# Patient Record
Sex: FEMALE | Race: BLACK OR AFRICAN AMERICAN | Employment: OTHER | URBAN - METROPOLITAN AREA
[De-identification: names, ages, dates, MRNs, and addresses within clinical notes are randomized per-mention and may not be internally consistent; named-entity substitution may affect disease eponyms.]

---

## 2017-03-09 ENCOUNTER — OFFICE VISIT (OUTPATIENT)
Dept: INTERNAL MEDICINE CLINIC | Age: 67
End: 2017-03-09

## 2017-03-09 VITALS
TEMPERATURE: 97.9 F | HEIGHT: 65 IN | DIASTOLIC BLOOD PRESSURE: 88 MMHG | OXYGEN SATURATION: 100 % | SYSTOLIC BLOOD PRESSURE: 155 MMHG | WEIGHT: 144.8 LBS | HEART RATE: 70 BPM | BODY MASS INDEX: 24.12 KG/M2 | RESPIRATION RATE: 18 BRPM

## 2017-03-09 DIAGNOSIS — N39.0 LOWER URINARY TRACT INFECTIOUS DISEASE: Primary | ICD-10-CM

## 2017-03-09 DIAGNOSIS — R09.81 SINUS CONGESTION: ICD-10-CM

## 2017-03-09 DIAGNOSIS — I10 ESSENTIAL HYPERTENSION: ICD-10-CM

## 2017-03-09 RX ORDER — POLYMYXIN B SULFATE AND TRIMETHOPRIM 1; 10000 MG/ML; [USP'U]/ML
1 SOLUTION OPHTHALMIC EVERY 4 HOURS
Qty: 10 BOTTLE | Refills: 1 | Status: SHIPPED | OUTPATIENT
Start: 2017-03-09 | End: 2017-03-16

## 2017-03-09 RX ORDER — CEFUROXIME AXETIL 500 MG/1
500 TABLET ORAL 2 TIMES DAILY
Qty: 20 TAB | Refills: 0 | Status: SHIPPED | OUTPATIENT
Start: 2017-03-09 | End: 2017-03-16

## 2017-03-09 NOTE — MR AVS SNAPSHOT
Visit Information Date & Time Provider Department Dept. Phone Encounter #  
 3/9/2017  2:45 PM Mary Morocho MD [facility] - Rajni Seo [phone] [id number] Follow-up Instructions Return in about 6 months (around 9/9/2017), or if symptoms worsen or fail to improve. Follow-up and Disposition History Your Appointments 4/20/2017  4:45 PM  
Any with MD PRABHJOT [facility] - Rajni Seo ([facility]) Appt Note: 4 month f/u  
 [address], [place] [address] [place] [address]  
  
   
 [address] [facility] [address] [facility] [facility] Upcoming Health Maintenance Date Due  
 GLAUCOMA SCREENING Q2Y 9/29/2016 OSTEOPOROSIS SCREENING (DEXA) 9/29/2016 Pneumococcal 65+ Low/Medium Risk (1 of 2 - PCV13) 9/29/2016 FOBT Q 1 YEAR AGE 50-75 4/26/2017 MEDICARE YEARLY EXAM 12/16/2017 BREAST CANCER SCRN MAMMOGRAM 5/20/2018 DTaP/Tdap/Td series (2 - Td) 5/26/2026 Allergies as of 3/9/2017  Review Complete On: 3/9/2017 By: Mary Morocho MD  
  
 Severity Noted Reaction Type Reactions Tamiflu [Oseltamivir Phosphate]  02/03/2015    Hives Current Immunizations  Never Reviewed No immunizations on file. Not reviewed this visit You Were Diagnosed With   
  
 Codes Comments Lower urinary tract infectious disease    -  Primary ICD-10-CM: N39.0 ICD-9-CM: 599.0 Sinus congestion     ICD-10-CM: R09.81 ICD-9-CM: 478.19 Essential hypertension     ICD-10-CM: I10 
ICD-9-CM: 401.9 Vitals BP Pulse Temp Resp Height(growth percentile) Weight(growth percentile) 155/88 (BP 1 Location: Right arm, BP Patient Position: Sitting) 70 97.9 °F (36.6 °C) 18 5' 5\" (1.651 m) 144 lb 12.8 oz (65.7 kg) SpO2 BMI OB Status Smoking Status 100% 24.1 kg/m2 Hysterectomy Never Smoker BMI and BSA Data  Body Mass Index Body Surface Area  
 24.1 kg/m 2 1.74 m 2  
  
  
 Preferred Pharmacy Pharmacy Name Phone [facility] #[id number] [facility] [phone] Your Updated Medication List  
  
   
This list is accurate as of: 3/9/17  4:54 PM.  Always use your most recent med list.  
  
  
  
  
 cefUROXime 500 mg tablet Commonly known as:  CEFTIN Take 1 Tab by mouth two (2) times a day for 10 days. diphenhydrAMINE 25 mg capsule Commonly known as:  BENADRYL  
  
 fluconazole 150 mg tablet Commonly known as:  DIFLUCAN  
TAKE 1 TABLET BY MOUTH ONCE DAILY AS DIRECTED  
  
 fluocinoNIDE 0.05 % topical cream  
Commonly known as:  LIDEX HYDROmorphone 2 mg tablet Commonly known as:  DILAUDID Take 1 Tab by mouth every four (4) hours as needed for Pain. Lactobacillus acidophilus 460 mg (20 billion cell) Cap Commonly known as:  Herminia Bottoms Take 1 Tab by mouth daily. mometasone 50 mcg/actuation nasal spray Commonly known as:  NASONEX  
2 Sprays by Both Nostrils route daily. olmesartan-hydroCHLOROthiazide 20-12.5 mg per tablet Commonly known as:  BENICAR HCT Take 1 Tab by mouth daily. permethrin 5 % topical cream  
Commonly known as:  ACTICIN  
  
 pneumococcal 13 peggy conj dip 0.5 mL Syrg injection Commonly known as:  PREVNAR-13  
0.5 mL by IntraMUSCular route once for 1 dose. PSEUDOEPHEDRINE-guaiFENesin Tb12 extended release tablet Commonly known as:  [address] Take 1 Tab by mouth two (2) times a day. sodium chloride 0.65 % nasal spray Commonly known as:  OCEAN  
2 Sprays by Both Nostrils route four (4) times daily. SUMAtriptan 50 mg tablet Commonly known as:  IMITREX Take 1 Tab by mouth once as needed for Migraine for up to 1 dose. TOPAMAX 200 mg tablet Generic drug:  topiramate Take  by mouth nightly. traZODone 100 mg tablet Commonly known as:  Marva Fran Take 1 Tab by mouth nightly. triamcinolone acetonide 0.1 % topical cream  
Commonly known as:  KENALOG Apply  to affected area two (2) times a day. trimethoprim-polymyxin b ophthalmic solution Commonly known as:  POLYTRIM Administer 1 Drop to both eyes every four (4) hours for 10 days. Prescriptions Sent to Pharmacy Refills  
 pneumococcal 13 peggy conj dip (PREVNAR-13) 0.5 mL syrg injection 0 Si.5 mL by IntraMUSCular route once for 1 dose. Class: Normal  
 [id number] [address] Ph #: [phone] Route: IntraMUSCular  
 trimethoprim-polymyxin b (POLYTRIM) ophthalmic solution 1 Sig: Administer 1 Drop to both eyes every four (4) hours for 10 days. Class: Normal  
 [id number] [address] Ph #: [phone] Route: Both Eyes  
 cefUROXime (CEFTIN) 500 mg tablet 0 Sig: Take 1 Tab by mouth two (2) times a day for 10 days. Class: Normal  
 [id number] Ph #: [phone] Route: Oral  
  
We Performed the Following CULTURE, URINE [id number] CPT(R)] RENAL FUNCTION PANEL [24143 CPT(R)] URINALYSIS W/ RFLX MICROSCOPIC [08417 CPT(R)] Follow-up Instructions Return in about 6 months (around 2017), or if symptoms worsen or fail to improve. To-Do List   
 2017 3:30 PM  
(Arrive by 3:15 PM) Appointment with [facility] 1 at [facility] ([phone]) Shower or bathe using soap and water. Do not use deodorant, powder, perfumes, or lotion the day of your exam.  If your prior mammograms were not performed at [facility] 6 please bring films with you or forward prior images 2 days before your procedure.   Check in at registration 15min before your appointment time unless you were instructed to do otherwise. A script is not necessary, but if you have one, please bring it on the day of the mammogram or have it faxed to the department. [facility] [phone] [facility]  [phone] [facility] Roberto [address] [place] [phone] [facility] [phone] [place] [id number] [facility] [phone] Please arrive 15 minutes prior to appointment to register Introducing [facility]! Jason Sosa introduces [employer] patient portal. Now you can access parts of your medical record, email your doctor's office, and request medication refills online. 1. In your internet browser, go to [email or web address] 2. Click on the First Time User? Click Here link in the Sign In box. You will see the New Member Sign Up page. 3. Enter your [employer] Access Code exactly as it appears below. You will not need to use this code after youve completed the sign-up process. If you do not sign up before the expiration date, you must request a new code. · [employer] Access Code: [email or web address] Expires: 3/15/2017  3:17 PM 
 
4. Enter the last four digits of your Social Security Number (xxxx) and Date of Birth (mm/dd/yyyy) as indicated and click Submit. You will be taken to the next sign-up page. 5. Create a [employer] ID. This will be your [employer] login ID and cannot be changed, so think of one that is secure and easy to remember. 6. Create a [employer] password. You can change your password at any time. 7. Enter your Password Reset Question and Answer. This can be used at a later time if you forget your password. 8. Enter your e-mail address. You will receive e-mail notification when new information is available in [address] 9. Click Sign Up. You can now view and download portions of your medical record. 10. Click the Download Summary menu link to download a portable copy of your medical information. If you have questions, please visit the Frequently Asked Questions section of the [employer] website.  Remember, [employer] is NOT to be used for urgent needs. For medical emergencies, dial 911. Now available from your iPhone and Android! Please provide this summary of care documentation to your next provider. Your primary care clinician is listed as Payton Snyder. If you have any questions after today's visit, please call [phone].

## 2017-03-09 NOTE — PROGRESS NOTES
SPORTS MEDICINE [facility] PRIMARY CARE  Alex Perkins MD, [address] [address]  Phone:  [phone]  Fax: [phone]      Chief Complaint   Patient presents with    Urinary Pain     patient complain of having painful urination, burning when urinating, frequent urination    Sinus Infection     patient states that she has pain in both eyes, sinus headaches , and  bleeding from the nose patient states that she has just finished 10 days of mucinex D     Eye Drainage     patient states that she has some drainage in both eyes for 1 week         SUBECTIVE:    Shelia Thorpe is a 73 y.o. female Patient returns today ambulatory, alert and appropriate and has the capacity to give an accurate history. She has a known history of dyslipidemia, headaches, hypertension, prediabetes, spinal stenosis, and is seen for evaluation. Patient states she has three infections going on. She has a sinus infection that started about two or three weeks ago. She started using Mucinex D which accounts for the elevated blood pressure. She has facial pain but little drainage. She noted bilateral eye infections for about a week. She wakes up in the morning and they are actually stuck together. For the past ten days she has had pain, burning, and urinary frequency and is seen for evaluation. Current Outpatient Prescriptions   Medication Sig Dispense Refill    pneumococcal 13 peggy conj dip (PREVNAR-13) 0.5 mL syrg injection 0.5 mL by IntraMUSCular route once for 1 dose. 0.5 mL 0    trimethoprim-polymyxin b (POLYTRIM) ophthalmic solution Administer 1 Drop to both eyes every four (4) hours for 10 days. 10 Bottle 1    cefUROXime (CEFTIN) 500 mg tablet Take 1 Tab by mouth two (2) times a day for 10 days. 20 Tab 0    traZODone (DESYREL) 100 mg tablet Take 1 Tab by mouth nightly. 180 Tab 2    SUMAtriptan (IMITREX) 50 mg tablet Take 1 Tab by mouth once as needed for Migraine for up to 1 dose. 9 Tab 10    fluconazole (DIFLUCAN) 150 mg tablet TAKE 1 TABLET BY MOUTH ONCE DAILY AS DIRECTED 2 Tab 1    mometasone (NASONEX) 50 mcg/actuation nasal spray 2 Sprays by Both Nostrils route daily. 1 Container 1    triamcinolone acetonide (KENALOG) 0.1 % topical cream Apply  to affected area two (2) times a day. 60 g 3    HYDROmorphone (DILAUDID) 2 mg tablet Take 1 Tab by mouth every four (4) hours as needed for Pain. 10 Tab 0    topiramate (TOPAMAX) 200 mg tablet Take  by mouth nightly.  sodium chloride (OCEAN) 0.65 % nasal spray 2 Sprays by Both Nostrils route four (4) times daily. 45 mL 11    Lactobacillus acidophilus (FLORAJEN) 460 mg (20 billion cell) cap Take 1 Tab by mouth daily. 14 Cap 0    PSEUDOEPHEDRINE-guaiFENesin (MUCINEX D MAXIMUM STRENGTH) Tb12 extended release tablet Take 1 Tab by mouth two (2) times a day. 60 Tab 1    diphenhydrAMINE (BENADRYL) 25 mg capsule   0    fluocinoNIDE (LIDEX) 0.05 % topical cream   2    permethrin (ACTICIN) 5 % topical cream   0    olmesartan-hydrochlorothiazide (BENICAR HCT) 20-12.5 mg per tablet Take 1 Tab by mouth daily. 80 Tab 11     Past Medical History:   Diagnosis Date    Bereavement 3/16    father - dementia - age 80    Dense breasts     Depression     Dyslipidemia     Headache     High cholesterol     HTN (hypertension)     Hypertension     Migraines     Prediabetes     Rash     S/P colonoscopy 4-3-14    Sinus congestion     Spinal stenosis     UTI (lower urinary tract infection)      Past Surgical History:   Procedure Laterality Date    HX COLONOSCOPY      HX GYN      HX HYSTERECTOMY       Allergies   Allergen Reactions    Tamiflu [Oseltamivir Phosphate] Hives       REVIEW OF SYSTEMS:   No chills. No fever.         Social History     Social History    Marital status:      Spouse name: N/A    Number of children: N/A    Years of education: N/A     Social History Main Topics    Smoking status: Never Smoker    Smokeless tobacco: None    Alcohol use No    Drug use: No    Sexual activity: Yes     Partners: Male     Birth control/ protection: None     Other Topics Concern    None     Social History Narrative         Family History: Mother:  76 yrs, Hypertension and diabetes mi,cvaFather: alive 80 yrs, htnDaughter(s): alive 32 yrsSon(s): alive 28    yrs1 son(s) , 1 daughter(s) . Social History: Alcohol Use Patient uses alcohol, Drinks per occasion: 2, Drinks per w [place]: 0. Smoking Status Patient is a never    smoker. Marital Status: . Lives alone.  left suddenly 2015Occupation/W ork: not employed. Education/School: has highschool diploma, has college    diploma [address]  Family History   Problem Relation Age of Onset    Stroke Mother        OBJECTIVE:  Visit Vitals    /88 (BP 1 Location: Right arm, BP Patient Position: Sitting)    Pulse 70    Temp 97.9 °F (36.6 °C)    Resp 18    Ht 5' 5\" (1.651 m)    Wt 144 lb 12.8 oz (65.7 kg)    SpO2 100%    BMI 24.1 kg/m2     ENT: perrla,  eom intact  NECK: supple. Thyroid normal  CHEST: clear to ascultation and percussion   HEART: regular rate and rhythm  ABD: soft, bowel sounds active  EXTREMITIES: no edema, pulse 1+     No visits with results within 3 Month(s) from this visit.   Latest known visit with results is:    Office Visit on 09/15/2016   Component Date Value Ref Range Status    Specific Gravity 09/15/2016 1.018  1.005 - 1.030 Final    pH (UA) 09/15/2016 7.0  5.0 - 7.5 Final    Color 09/15/2016 Yellow  Yellow Final    Appearance 09/15/2016 Clear  Clear Final    Leukocyte Esterase 09/15/2016 Negative  Negative Final    Protein 09/15/2016 Negative  Negative/Trace Final    Glucose 09/15/2016 Negative  Negative Final    Ketone 09/15/2016 Negative  Negative Final    Blood 09/15/2016 Negative  Negative Final    Bilirubin 09/15/2016 Negative  Negative Final    Urobilinogen 09/15/2016 0.2  0.2 - 1.0 mg/dL Final  Nitrites 09/15/2016 Negative  Negative Final    Microscopic Examination 09/15/2016 Comment   Final    Microscopic not indicated and not performed.  WBC 09/15/2016 4.5  3.4 - 10.8 x10E3/uL Final    RBC 09/15/2016 4.28  3.77 - 5.28 x10E6/uL Final    HGB 09/15/2016 12.6  11.1 - 15.9 g/dL Final    HCT 09/15/2016 37.5  34.0 - 46.6 % Final    MCV 09/15/2016 88  79 - 97 fL Final    MCH 09/15/2016 29.4  26.6 - 33.0 pg Final    MCHC 09/15/2016 33.6  31.5 - 35.7 g/dL Final    RDW 09/15/2016 14.1  12.3 - 15.4 % Final    PLATELET 58/80/9163 040  150 - 379 x10E3/uL Final    NEUTROPHILS 09/15/2016 42  % Final    Lymphocytes 09/15/2016 43  % Final    MONOCYTES 09/15/2016 7  % Final    EOSINOPHILS 09/15/2016 7  % Final    BASOPHILS 09/15/2016 1  % Final    ABS. NEUTROPHILS 09/15/2016 1.9  1.4 - 7.0 x10E3/uL Final    Abs Lymphocytes 09/15/2016 1.9  0.7 - 3.1 x10E3/uL Final    ABS. MONOCYTES 09/15/2016 0.3  0.1 - 0.9 x10E3/uL Final    ABS. EOSINOPHILS 09/15/2016 0.3  0.0 - 0.4 x10E3/uL Final    ABS. BASOPHILS 09/15/2016 0.0  0.0 - 0.2 x10E3/uL Final    IMMATURE GRANULOCYTES 09/15/2016 0  % Final    ABS. IMM.  GRANS. 09/15/2016 0.0  0.0 - 0.1 x10E3/uL Final    Glucose 09/15/2016 96  65 - 99 mg/dL Final    BUN 09/15/2016 14  8 - 27 mg/dL Final    Creatinine 09/15/2016 1.02* 0.57 - 1.00 mg/dL Final    GFR est non-AA 09/15/2016 58* >59 mL/min/1.73 Final    GFR est AA 09/15/2016 67  >59 mL/min/1.73 Final    BUN/Creatinine ratio 09/15/2016 14  11 - 26 Final    Sodium 09/15/2016 143  134 - 144 mmol/L Final    Potassium 09/15/2016 4.2  3.5 - 5.2 mmol/L Final    Chloride 09/15/2016 106  97 - 108 mmol/L Final    CO2 09/15/2016 22  18 - 29 mmol/L Final    Calcium 09/15/2016 9.8  8.7 - 10.3 mg/dL Final    Protein, total 09/15/2016 7.2  6.0 - 8.5 g/dL Final    Albumin 09/15/2016 4.3  3.6 - 4.8 g/dL Final    GLOBULIN, TOTAL 09/15/2016 2.9  1.5 - 4.5 g/dL Final    A-G Ratio 09/15/2016 1.5  1.1 - 2.5 Final  Bilirubin, total 09/15/2016 0.2  0.0 - 1.2 mg/dL Final    Alk. phosphatase 09/15/2016 54  39 - 117 IU/L Final    AST (SGOT) 09/15/2016 20  0 - 40 IU/L Final    ALT (SGPT) 09/15/2016 15  0 - 32 IU/L Final    Cholesterol, total 09/15/2016 237* 100 - 199 mg/dL Final    Triglyceride 09/15/2016 92  0 - 149 mg/dL Final    HDL Cholesterol 09/15/2016 63  >39 mg/dL Final    Comment: According to ATP-III Guidelines, HDL-C >59 mg/dL is considered a  negative risk factor for CHD.  VLDL, calculated 09/15/2016 18  5 - 40 mg/dL Final    LDL, calculated 09/15/2016 156* 0 - 99 mg/dL Final    TSH 09/15/2016 0.678  0.450 - 4.500 uIU/mL Final    Hemoglobin A1c 09/15/2016 5.8* 4.8 - 5.6 % Final    Comment:          Pre-diabetes: 5.7 - 6.4           Diabetes: >6.4           Glycemic control for adults with diabetes: <7.0      Estimated average glucose 09/15/2016 120  mg/dL Final          ASSESSMENT:  1. Lower urinary tract infectious disease    2. Sinus congestion    3. Essential hypertension      Patient has symptoms consistent with conjunctivitis and we will treat him with Polytrim. Patient has symptoms consistent with a sinus infection and treated with Ceftin. She also has a symptomatic urinary tract infection for which we will also use Ceftin. We encourage activity for 30 minutes five days a week. Her blood pressure is up and may be related to the Sudafed she is taking for her sinuses. She will check her blood pressure and if it remains elevated she will contact us. She will return to the office otherwise in six months.          PLAN:  .  Orders Placed This Encounter    CULTURE, URINE    RENAL FUNCTION PANEL    URINALYSIS W/ RFLX MICROSCOPIC    pneumococcal 13 peggy conj dip (PREVNAR-13) 0.5 mL syrg injection    trimethoprim-polymyxin b (POLYTRIM) ophthalmic solution    cefUROXime (CEFTIN) 500 mg tablet       Follow-up Disposition:  Return in about 6 months (around 9/9/2017), or if symptoms worsen or fail to improve. ATTENTION:   This medical record was transcribed using an electronic medical records system. Although proofread, it may and can contain electronic and spelling errors. Other human spelling and other errors may be present. Corrections may be executed at a later time. Please feel free to contact us for any clarifications as needed.

## 2017-03-09 NOTE — PROGRESS NOTES
1. Have you been to the ER, urgent care clinic since your last visit? Hospitalized since your last visit? No    2. Have you seen or consulted any other health care providers outside of the [facility] since your last visit? Include any pap smears or colon screening.  No

## 2017-03-16 ENCOUNTER — HOSPITAL ENCOUNTER (EMERGENCY)
Age: 67
Discharge: HOME | End: 2017-03-16
Attending: EMERGENCY MEDICINE
Payer: COMMERCIAL

## 2017-03-16 VITALS
OXYGEN SATURATION: 99 % | HEART RATE: 75 BPM | RESPIRATION RATE: 23 BRPM | HEIGHT: 65 IN | BODY MASS INDEX: 24.16 KG/M2 | DIASTOLIC BLOOD PRESSURE: 66 MMHG | TEMPERATURE: 98.3 F | SYSTOLIC BLOOD PRESSURE: 138 MMHG | WEIGHT: 145 LBS

## 2017-03-16 DIAGNOSIS — R11.2 NAUSEA AND VOMITING, INTRACTABILITY OF VOMITING NOT SPECIFIED, UNSPECIFIED VOMITING TYPE: ICD-10-CM

## 2017-03-16 DIAGNOSIS — R19.7 DIARRHEA, UNSPECIFIED TYPE: ICD-10-CM

## 2017-03-16 DIAGNOSIS — M54.50 ACUTE LOW BACK PAIN WITHOUT SCIATICA, UNSPECIFIED BACK PAIN LATERALITY: ICD-10-CM

## 2017-03-16 DIAGNOSIS — R10.84 ABDOMINAL PAIN, GENERALIZED: Primary | ICD-10-CM

## 2017-03-16 LAB
ALBUMIN SERPL BCP-MCNC: 4.2 G/DL (ref 3.5–5)
ALBUMIN/GLOB SERPL: 1.1 {RATIO} (ref 1.1–2.2)
ALP SERPL-CCNC: 57 U/L (ref 45–117)
ALT SERPL-CCNC: 20 U/L (ref 12–78)
AMORPH CRY URNS QL MICRO: ABNORMAL
ANION GAP BLD CALC-SCNC: 10 MMOL/L (ref 5–15)
APPEARANCE UR: ABNORMAL
AST SERPL W P-5'-P-CCNC: 21 U/L (ref 15–37)
BACTERIA URNS QL MICRO: NEGATIVE /HPF
BASOPHILS # BLD AUTO: 0 K/UL (ref 0–0.1)
BASOPHILS # BLD: 0 % (ref 0–1)
BILIRUB SERPL-MCNC: 0.5 MG/DL (ref 0.2–1)
BILIRUB UR QL: NEGATIVE
BUN SERPL-MCNC: 24 MG/DL (ref 6–20)
BUN/CREAT SERPL: 21 (ref 12–20)
CALCIUM SERPL-MCNC: 8.9 MG/DL (ref 8.5–10.1)
CHLORIDE SERPL-SCNC: 109 MMOL/L (ref 97–108)
CO2 SERPL-SCNC: 25 MMOL/L (ref 21–32)
COLOR UR: ABNORMAL
CREAT SERPL-MCNC: 1.14 MG/DL (ref 0.55–1.02)
DIFFERENTIAL METHOD BLD: ABNORMAL
EOSINOPHIL # BLD: 0 K/UL (ref 0–0.4)
EOSINOPHIL NFR BLD: 0 % (ref 0–7)
EPITH CASTS URNS QL MICRO: ABNORMAL /LPF
ERYTHROCYTE [DISTWIDTH] IN BLOOD BY AUTOMATED COUNT: 13.4 % (ref 11.5–14.5)
GLOBULIN SER CALC-MCNC: 3.8 G/DL (ref 2–4)
GLUCOSE SERPL-MCNC: 127 MG/DL (ref 65–100)
GLUCOSE UR STRIP.AUTO-MCNC: NEGATIVE MG/DL
HCT VFR BLD AUTO: 43 % (ref 35–47)
HGB BLD-MCNC: 13.7 G/DL (ref 11.5–16)
HGB UR QL STRIP: NEGATIVE
KETONES UR QL STRIP.AUTO: NEGATIVE MG/DL
LEUKOCYTE ESTERASE UR QL STRIP.AUTO: NEGATIVE
LIPASE SERPL-CCNC: 120 U/L (ref 73–393)
LYMPHOCYTES # BLD AUTO: 4 % (ref 12–49)
LYMPHOCYTES # BLD: 0.3 K/UL (ref 0.8–3.5)
MCH RBC QN AUTO: 28.5 PG (ref 26–34)
MCHC RBC AUTO-ENTMCNC: 31.9 G/DL (ref 30–36.5)
MCV RBC AUTO: 89.4 FL (ref 80–99)
MONOCYTES # BLD: 0.3 K/UL (ref 0–1)
MONOCYTES NFR BLD AUTO: 4 % (ref 5–13)
MUCOUS THREADS URNS QL MICRO: ABNORMAL /LPF
NEUTS SEG # BLD: 7 K/UL (ref 1.8–8)
NEUTS SEG NFR BLD AUTO: 92 % (ref 32–75)
NITRITE UR QL STRIP.AUTO: NEGATIVE
PH UR STRIP: 5.5 [PH] (ref 5–8)
PLATELET # BLD AUTO: 330 K/UL (ref 150–400)
POTASSIUM SERPL-SCNC: 3.6 MMOL/L (ref 3.5–5.1)
PROT SERPL-MCNC: 8 G/DL (ref 6.4–8.2)
PROT UR STRIP-MCNC: NEGATIVE MG/DL
RBC # BLD AUTO: 4.81 M/UL (ref 3.8–5.2)
RBC #/AREA URNS HPF: ABNORMAL /HPF (ref 0–5)
RBC MORPH BLD: ABNORMAL
SODIUM SERPL-SCNC: 144 MMOL/L (ref 136–145)
SP GR UR REFRACTOMETRY: >1.03 (ref 1–1.03)
UA: UC IF INDICATED,UAUC: ABNORMAL
UROBILINOGEN UR QL STRIP.AUTO: 0.2 EU/DL (ref 0.2–1)
WBC # BLD AUTO: 7.6 K/UL (ref 3.6–11)
WBC URNS QL MICRO: ABNORMAL /HPF (ref 0–4)

## 2017-03-16 PROCEDURE — 93005 ELECTROCARDIOGRAM TRACING: CPT

## 2017-03-16 PROCEDURE — 74011250636 HC RX REV CODE- 250/636: Performed by: EMERGENCY MEDICINE

## 2017-03-16 PROCEDURE — 96374 THER/PROPH/DIAG INJ IV PUSH: CPT

## 2017-03-16 PROCEDURE — 96361 HYDRATE IV INFUSION ADD-ON: CPT

## 2017-03-16 PROCEDURE — 36415 COLL VENOUS BLD VENIPUNCTURE: CPT | Performed by: EMERGENCY MEDICINE

## 2017-03-16 PROCEDURE — 99285 EMERGENCY DEPT VISIT HI MDM: CPT

## 2017-03-16 PROCEDURE — 80053 COMPREHEN METABOLIC PANEL: CPT | Performed by: EMERGENCY MEDICINE

## 2017-03-16 PROCEDURE — 81001 URINALYSIS AUTO W/SCOPE: CPT | Performed by: EMERGENCY MEDICINE

## 2017-03-16 PROCEDURE — 83690 ASSAY OF LIPASE: CPT | Performed by: EMERGENCY MEDICINE

## 2017-03-16 PROCEDURE — 85025 COMPLETE CBC W/AUTO DIFF WBC: CPT | Performed by: EMERGENCY MEDICINE

## 2017-03-16 PROCEDURE — 96375 TX/PRO/DX INJ NEW DRUG ADDON: CPT

## 2017-03-16 RX ORDER — ONDANSETRON 4 MG/1
4 TABLET, ORALLY DISINTEGRATING ORAL
Qty: 20 TAB | Refills: 0 | Status: SHIPPED | OUTPATIENT
Start: 2017-03-16 | End: 2017-09-08 | Stop reason: ALTCHOICE

## 2017-03-16 RX ORDER — MORPHINE SULFATE 4 MG/ML
4 INJECTION, SOLUTION INTRAMUSCULAR; INTRAVENOUS ONCE
Status: COMPLETED | OUTPATIENT
Start: 2017-03-16 | End: 2017-03-16

## 2017-03-16 RX ORDER — ONDANSETRON 2 MG/ML
8 INJECTION INTRAMUSCULAR; INTRAVENOUS
Status: COMPLETED | OUTPATIENT
Start: 2017-03-16 | End: 2017-03-16

## 2017-03-16 RX ORDER — HYDROCODONE BITARTRATE AND ACETAMINOPHEN 5; 325 MG/1; MG/1
1 TABLET ORAL
Qty: 20 TAB | Refills: 0 | Status: SHIPPED | OUTPATIENT
Start: 2017-03-16 | End: 2017-09-08 | Stop reason: ALTCHOICE

## 2017-03-16 RX ORDER — LOPERAMIDE HCL 2 MG
2 TABLET ORAL
Qty: 20 TAB | Refills: 0 | Status: SHIPPED | OUTPATIENT
Start: 2017-03-16 | End: 2017-03-21

## 2017-03-16 RX ADMIN — SODIUM CHLORIDE 1000 ML: 900 INJECTION, SOLUTION INTRAVENOUS at 10:44

## 2017-03-16 RX ADMIN — ONDANSETRON 8 MG: 2 INJECTION INTRAMUSCULAR; INTRAVENOUS at 10:48

## 2017-03-16 RX ADMIN — Medication 4 MG: at 10:48

## 2017-03-16 NOTE — ED NOTES
The patient was discharged home by Dr. Jason Serrano and Sneha Magallanes RN in stable condition, accompanied by daughter . The patient is alert and oriented, is in no respiratory distress. The patient's diagnosis, condition and treatment were explained to patient or parent/guardian. The patient/responsible party expressed understanding. 1 prescriptions given to pt. No work/school note given to pt. A discharge plan has been developed. A  was not involved in the process. Aftercare instructions were given to the patient.

## 2017-03-16 NOTE — ED PROVIDER NOTES
HPI Comments: The patient complains of abdominal cramps that started at around 9 PM last night, and has been associated with 2 episodes of vomiting and approximately 6 episodes of nonbloody diarrhea. She also complains of low back pain that started 2 days ago after she bent over to  a box. The pain is mechanical and nonradiating. The patient has been on an antibiotic, Ceftin, for the past 7 days for a UTI and sinus infection. She denies fever or other complaints. Patient is a 73 y.o. female presenting with epigastric pain, vomiting, and back pain. Epigastric Pain    Associated symptoms include diarrhea, vomiting and back pain. Pertinent negatives include no fever, no nausea, no dysuria, no headaches and no chest pain. Vomiting    Associated symptoms include abdominal pain and diarrhea. Pertinent negatives include no fever, no headaches, no cough and no headaches. Back Pain    Associated symptoms include abdominal pain. Pertinent negatives include no chest pain, no fever, no headaches and no dysuria. Past Medical History:   Diagnosis Date    Bereavement 3/16    father - dementia - age 80    Dense breasts     Depression     Dyslipidemia     Headache     High cholesterol     HTN (hypertension)     Hypertension     Migraines     Prediabetes     Rash     S/P colonoscopy 4-3-14    Sinus congestion     Spinal stenosis     UTI (lower urinary tract infection)        Past Surgical History:   Procedure Laterality Date    HX COLONOSCOPY      HX GYN      HX HYSTERECTOMY           Family History:   Problem Relation Age of Onset    Stroke Mother        Social History     Social History    Marital status:      Spouse name: N/A    Number of children: N/A    Years of education: N/A     Occupational History    Not on file.      Social History Main Topics    Smoking status: Never Smoker    Smokeless tobacco: Not on file    Alcohol use No    Drug use: No    Sexual activity: Yes Partners: Male     Birth control/ protection: None     Other Topics Concern    Not on file     Social History Narrative         Family History: Mother:  76 yrs, Hypertension and diabetes mi,cvaFather: alive 80 yrs, htnDaughter(s): alive 32 yrsSon(s): alive 28    yrs1 son(s) , 1 daughter(s) . Social History: Alcohol Use Patient uses alcohol, Drinks per occasion: 2, Drinks per w [place]: 0. Smoking Status Patient is a never    smoker. Marital Status: . Lives alone.  left suddenly 2015Occupation/W ork: not employed. Education/School: has highschool diploma, has college    diploma [address]: Tamiflu [oseltamivir phosphate]    Review of Systems   Constitutional: Negative for fever. Eyes: Negative for visual disturbance. Respiratory: Negative for cough, shortness of breath and wheezing. Cardiovascular: Negative for chest pain and leg swelling. Gastrointestinal: Positive for abdominal pain, diarrhea and vomiting. Negative for nausea. Genitourinary: Negative for dysuria. Musculoskeletal: Positive for back pain. Negative for neck stiffness. Skin: Negative for rash. Neurological: Negative. Negative for syncope and headaches. Psychiatric/Behavioral: Negative for confusion. Vitals:    17 1020   Pulse: 79   Resp: 16   Temp: 98.3 °F (36.8 °C)   SpO2: 100%   Weight: 65.8 kg (145 lb)   Height: 5' 5\" (1.651 m)            Physical Exam   Constitutional: She appears well-developed and well-nourished. No distress. HENT:   Head: Normocephalic. Eyes: Pupils are equal, round, and reactive to light. Neck: Normal range of motion. Cardiovascular: Normal rate and regular rhythm. No murmur heard. Pulmonary/Chest: Effort normal and breath sounds normal. No respiratory distress. Abdominal: Soft. Minimal ttp  In periumbilical area. Musculoskeletal: Normal range of motion. She exhibits no edema. Neurological: She is alert.  She has normal strength. Skin: Skin is warm and dry. Psychiatric: She has a normal mood and affect. Her behavior is normal.   Nursing note and vitals reviewed. [facility]  ED Course       Procedures ED EKG interpretation:  Rhythm:nsr, rate 79, nsst changes. This EKG was interpreted by Vibha Mckee MD,ED Provider.

## 2017-03-16 NOTE — ED NOTES
Pt resting calmly on stretcher in NAD with daughter at bedside. Pt reports \"the pain is much better. \"  IVF infusing to gravity without difficulty. Pt on monitor x3.

## 2017-03-16 NOTE — ED TRIAGE NOTES
Pt brought to treatment area with c/o \"epigastric pain, vomiting, and diarrhea that started last night. \"  Pt states \"i also think I hurt my lower back when I was bending over to pick something up on Tuesday. \"  Pt denies fevers, SOB, urinary symptoms.

## 2017-03-16 NOTE — DISCHARGE INSTRUCTIONS

## 2017-03-17 LAB
ATRIAL RATE: 79 BPM
CALCULATED P AXIS, ECG09: 62 DEGREES
CALCULATED R AXIS, ECG10: -6 DEGREES
CALCULATED T AXIS, ECG11: -48 DEGREES
DIAGNOSIS, 93000: NORMAL
P-R INTERVAL, ECG05: 160 MS
Q-T INTERVAL, ECG07: 374 MS
QRS DURATION, ECG06: 92 MS
QTC CALCULATION (BEZET), ECG08: 428 MS
VENTRICULAR RATE, ECG03: 79 BPM

## 2017-04-13 RX ORDER — FLUCONAZOLE 150 MG/1
150 TABLET ORAL DAILY
Qty: 1 TAB | Refills: 0 | Status: SHIPPED | OUTPATIENT
Start: 2017-04-13 | End: 2017-04-14

## 2017-05-22 ENCOUNTER — HOSPITAL ENCOUNTER (OUTPATIENT)
Dept: MAMMOGRAPHY | Age: 67
Discharge: HOME | End: 2017-05-22
Attending: INTERNAL MEDICINE
Payer: COMMERCIAL

## 2017-05-22 DIAGNOSIS — Z12.31 VISIT FOR SCREENING MAMMOGRAM: ICD-10-CM

## 2017-05-22 PROCEDURE — 77067 SCR MAMMO BI INCL CAD: CPT

## 2017-09-07 ENCOUNTER — OFFICE VISIT (OUTPATIENT)
Dept: INTERNAL MEDICINE CLINIC | Age: 67
End: 2017-09-07

## 2017-09-07 VITALS
RESPIRATION RATE: 18 BRPM | WEIGHT: 148 LBS | DIASTOLIC BLOOD PRESSURE: 79 MMHG | HEART RATE: 69 BPM | BODY MASS INDEX: 24.66 KG/M2 | TEMPERATURE: 98.6 F | SYSTOLIC BLOOD PRESSURE: 146 MMHG | OXYGEN SATURATION: 100 % | HEIGHT: 65 IN

## 2017-09-07 DIAGNOSIS — E78.00 HIGH CHOLESTEROL: ICD-10-CM

## 2017-09-07 DIAGNOSIS — M48.07 SPINAL STENOSIS OF LUMBOSACRAL REGION: ICD-10-CM

## 2017-09-07 DIAGNOSIS — R73.03 PREDIABETES: ICD-10-CM

## 2017-09-07 DIAGNOSIS — I10 ESSENTIAL HYPERTENSION: Primary | ICD-10-CM

## 2017-09-07 NOTE — PROGRESS NOTES
SPORTS MEDICINE AND PRIMARY CARE  Nolan Shay MD, [address] [address]  Phone:  [phone]  Fax: [phone]      Chief Complaint   Patient presents with    Physical         SUBECTIVE:    Yemi Mclaughlin is a 73 y.o. femalePatient returns today alert, appropriate, ambulatory and has the capacity to give an accurate history. She has a known history of , primary hypertension, prediabetes,  and is seen for evaluation. Current Outpatient Prescriptions   Medication Sig Dispense Refill    pneumococcal 23-valent (PNEUMOVAX 23) 25 mcg/0.5 mL injection 0.5 mL by IntraMUSCular route once for 1 dose. 0.5 mL 0    traZODone (DESYREL) 100 mg tablet Take 1 Tab by mouth nightly. 180 Tab 2    SUMAtriptan (IMITREX) 50 mg tablet Take 1 Tab by mouth once as needed for Migraine for up to 1 dose. 9 Tab 10    topiramate (TOPAMAX) 200 mg tablet Take  by mouth nightly.  HYDROcodone-acetaminophen (NORCO) 5-325 mg per tablet Take 1 Tab by mouth every four (4) hours as needed for Pain for up to 20 doses. Max Daily Amount: 6 Tabs. 20 Tab 0    ondansetron (ZOFRAN ODT) 4 mg disintegrating tablet Take 1 Tab by mouth every eight (8) hours as needed for Nausea. 20 Tab 0    Lactobacillus acidophilus (FLORAJEN) 460 mg (20 billion cell) cap Take 1 Tab by mouth daily.  14 Cap 0     Past Medical History:   Diagnosis Date    Bereavement 3/16    father - dementia - age 80    Dense breasts     Depression     Dyslipidemia     Headache     High cholesterol     HTN (hypertension)     Hypertension     Migraines     Prediabetes     Rash     S/P colonoscopy 4-3-14    Sinus congestion     Spinal stenosis     UTI (lower urinary tract infection)      Past Surgical History:   Procedure Laterality Date    HX BREAST BIOPSY Right 2000    neg; surgical bx    HX COLONOSCOPY      HX GYN      HX HYSTERECTOMY       Allergies   Allergen Reactions    Tamiflu [Oseltamivir Phosphate] Hives       REVIEW OF SYSTEMS:   No chest pain. Social History     Social History    Marital status:      Spouse name: N/A    Number of children: N/A    Years of education: N/A     Social History Main Topics    Smoking status: Never Smoker    Smokeless tobacco: Never Used    Alcohol use No    Drug use: No    Sexual activity: Yes     Partners: Male     Birth control/ protection: None     Other Topics Concern    None     Social History Narrative         Family History: Mother:  76 yrs, Hypertension and diabetes mi,cvaFather: alive 80 yrs, htnDaughter(s): alive 32 yrsSon(s): alive 28    yrs1 son(s) , 1 daughter(s) . Social History: Alcohol Use Patient uses alcohol, Drinks per occasion: 2, Drinks per w [employer]: 0. Smoking Status Patient is a never    smoker. Marital Status: . Lives alone.  left suddenly  returned ! Occupation/W ork: not employed. Education/School: has highschool diploma, has college    diploma [id number]  Family History   Problem Relation Age of Onset    Stroke Mother        OBJECTIVE:  Visit Vitals    /79 (BP 1 Location: Right arm, BP Patient Position: Sitting)    Pulse 69    Temp 98.6 °F (37 °C) (Oral)    Resp 18    Ht 5' 5\" (1.651 m)    Wt 148 lb (67.1 kg)    SpO2 100%    BMI 24.63 kg/m2     ENT: perrla,  eom intact  NECK: supple. Thyroid normal  CHEST: clear to ascultation and percussion   HEART: regular rate and rhythm  ABD: soft, bowel sounds active  EXTREMITIES: no edema, pulse 1+     No visits with results within 3 Month(s) from this visit.   Latest known visit with results is:    Admission on 2017, Discharged on 2017   Component Date Value Ref Range Status    Ventricular Rate 2017 79  BPM Final    Atrial Rate 2017 79  BPM Final    P-R Interval 2017 160  ms Final    QRS Duration 2017 92  ms Final    Q-T Interval 2017 374  ms Final    QTC Calculation (Bezet) 2017 428 ms Final    Calculated P Axis 03/16/2017 62  degrees Final    Calculated R Axis 03/16/2017 -6  degrees Final    Calculated T Axis 03/16/2017 -48  degrees Final    Diagnosis 03/16/2017    Final                    Value:Normal sinus rhythm  Nonspecific ST and T wave abnormality  Abnormal ECG  When compared with ECG of 15-JUL-2012 14:02,  Nonspecific T wave abnormality now evident in Anterolateral leads  Confirmed by Emilia Lugo MD., Surinder ([id number]) on 3/17/2017 12:14:34 AM      WBC 03/16/2017 7.6  3.6 - 11.0 K/uL Final    RBC 03/16/2017 4.81  3.80 - 5.20 M/uL Final    HGB 03/16/2017 13.7  11.5 - 16.0 g/dL Final    HCT 03/16/2017 43.0  35.0 - 47.0 % Final    MCV 03/16/2017 89.4  80.0 - 99.0 FL Final    MCH 03/16/2017 28.5  26.0 - 34.0 PG Final    MCHC 03/16/2017 31.9  30.0 - 36.5 g/dL Final    RDW 03/16/2017 13.4  11.5 - 14.5 % Final    PLATELET 07/21/0380 202  150 - 400 K/uL Final    NEUTROPHILS 03/16/2017 92* 32 - 75 % Final    LYMPHOCYTES 03/16/2017 4* 12 - 49 % Final    MONOCYTES 03/16/2017 4* 5 - 13 % Final    EOSINOPHILS 03/16/2017 0  0 - 7 % Final    BASOPHILS 03/16/2017 0  0 - 1 % Final    ABS. NEUTROPHILS 03/16/2017 7.0  1.8 - 8.0 K/UL Final    ABS. LYMPHOCYTES 03/16/2017 0.3* 0.8 - 3.5 K/UL Final    ABS. MONOCYTES 03/16/2017 0.3  0.0 - 1.0 K/UL Final    ABS. EOSINOPHILS 03/16/2017 0.0  0.0 - 0.4 K/UL Final    ABS.  BASOPHILS 03/16/2017 0.0  0.0 - 0.1 K/UL Final    DF 03/16/2017 SMEAR SCANNED    Final    RBC COMMENTS 03/16/2017 NORMOCYTIC, NORMOCHROMIC    Final    Sodium 03/16/2017 144  136 - 145 mmol/L Final    Potassium 03/16/2017 3.6  3.5 - 5.1 mmol/L Final    Chloride 03/16/2017 109* 97 - 108 mmol/L Final    CO2 03/16/2017 25  21 - 32 mmol/L Final    Anion gap 03/16/2017 10  5 - 15 mmol/L Final    Glucose 03/16/2017 127* 65 - 100 mg/dL Final    BUN 03/16/2017 24* 6 - 20 MG/DL Final    Creatinine 03/16/2017 1.14* 0.55 - 1.02 MG/DL Final    BUN/Creatinine ratio 03/16/2017 21* 12 - 20 Final    GFR est AA 03/16/2017 58* >60 ml/min/1.73m2 Final    GFR est non-AA 03/16/2017 48* >60 ml/min/1.73m2 Final    Comment: Estimated GFR is calculated using the IDMS-traceable Modification of Diet in Renal Disease (MDRD) Study equation, reported for both  Americans (GFRAA) and non- Americans (GFRNA), and normalized to 1.73m2 body surface area. The physician must decide which value applies to the patient. The MDRD study equation should only be used in individuals age 25 or older. It has not been validated for the following: pregnant women, patients with serious comorbid conditions, or on certain medications, or persons with extremes of body size, muscle mass, or nutritional status.  Calcium 03/16/2017 8.9  8.5 - 10.1 MG/DL Final    Bilirubin, total 03/16/2017 0.5  0.2 - 1.0 MG/DL Final    ALT (SGPT) 03/16/2017 20  12 - 78 U/L Final    AST (SGOT) 03/16/2017 21  15 - 37 U/L Final    Alk.  phosphatase 03/16/2017 57  45 - 117 U/L Final    Protein, total 03/16/2017 8.0  6.4 - 8.2 g/dL Final    Albumin 03/16/2017 4.2  3.5 - 5.0 g/dL Final    Globulin 03/16/2017 3.8  2.0 - 4.0 g/dL Final    A-G Ratio 03/16/2017 1.1  1.1 - 2.2   Final    Lipase 03/16/2017 120  73 - 393 U/L Final    Color 03/16/2017 YELLOW/STRAW    Final    Color Reference Range: Straw, Yellow or Dark Yellow    Appearance 03/16/2017 HAZY* CLEAR   Final    Specific gravity 03/16/2017 >1.030* 1.003 - 1.030 Final    pH (UA) 03/16/2017 5.5  5.0 - 8.0   Final    Protein 03/16/2017 NEGATIVE   NEG mg/dL Final    Glucose 03/16/2017 NEGATIVE   NEG mg/dL Final    Ketone 03/16/2017 NEGATIVE   NEG mg/dL Final    Bilirubin 03/16/2017 NEGATIVE   NEG   Final    Blood 03/16/2017 NEGATIVE   NEG   Final    Urobilinogen 03/16/2017 0.2  0.2 - 1.0 EU/dL Final    Nitrites 03/16/2017 NEGATIVE   NEG   Final    Leukocyte Esterase 03/16/2017 NEGATIVE   NEG   Final    WBC 03/16/2017 0-4  0 - 4 /hpf Final    RBC 03/16/2017 0-5  0 - 5 /hpf Final    Epithelial cells 03/16/2017 FEW  FEW /lpf Final    Epithelial cell category consists of squamous cells and /or transitional urothelial cells. Renal tubular cells, if present, are separately identified as such.  Bacteria 03/16/2017 NEGATIVE   NEG /hpf Final    UA:UC IF INDICATED 03/16/2017 CULTURE NOT INDICATED BY UA RESULT  CNI   Final    Mucus 03/16/2017 TRACE* NEG /lpf Final    Amorphous Crystals 03/16/2017 3+* NEG Final          ASSESSMENT:  1. Essential hypertension    2. High cholesterol    3. Prediabetes    4. Spinal stenosis of lumbosacral region      Patient's medical progress is satisfactory. Blood pressure not well controlled. She will check it at home and call if greated than 130/80   dyslipidemia manged with diet -will consider a statin   prediabetes - continue diet and exercise   spinal stenosis - continue analgesics and exercises        PLAN:  .  Orders Placed This Encounter    METABOLIC PANEL, BASIC    LIPID PANEL    HEMOGLOBIN A1C WITH EAG    REFERRAL TO OPHTHALMOLOGY    pneumococcal 23-valent (PNEUMOVAX 23) 25 mcg/0.5 mL injection       Follow-up Disposition:  Return in about 6 months (around 3/7/2018). ATTENTION:   This medical record was transcribed using an electronic medical records system. Although proofread, it may and can contain electronic and spelling errors. Other human spelling and other errors may be present. Corrections may be executed at a later time. Please feel free to contact us for any clarifications as needed.

## 2017-09-07 NOTE — PROGRESS NOTES
.1. Have you been to the ER, urgent care clinic since your last visit? Hospitalized since your last visit? No    2. Have you seen or consulted any other health care providers outside of the [address] [place] [address] since your last visit? Include any pap smears or colon screening.  No

## 2017-09-07 NOTE — MR AVS SNAPSHOT
Visit Information Date & Time Provider Department Dept. Phone Encounter #  
 9/7/2017  4:30 PM Bonita Anthony MD SPORTS MED AND PRIMARY CARE - [facility] [phone] [id number] Follow-up Instructions Return in about 6 months (around 3/7/2018). Follow-up and Disposition History Upcoming Health Maintenance Date Due FOBT Q 1 YEAR AGE 50-75 4/26/2017 MEDICARE YEARLY EXAM 12/16/2017 Pneumococcal 65+ Low/Medium Risk (2 of 2 - PPSV23) 9/7/2018 BREAST CANCER SCRN MAMMOGRAM 5/22/2019 GLAUCOMA SCREENING Q2Y 9/7/2019 DTaP/Tdap/Td series (2 - Td) 5/26/2026 Allergies as of 9/7/2017  Review Complete On: 9/7/2017 By: Amauri Duque Severity Noted Reaction Type Reactions Tamiflu [Oseltamivir Phosphate]  02/03/2015    Hives Current Immunizations  Never Reviewed No immunizations on file. Not reviewed this visit You Were Diagnosed With   
  
 Codes Comments Essential hypertension    -  Primary ICD-10-CM: I10 
ICD-9-CM: 401.9 High cholesterol     ICD-10-CM: E78.00 ICD-9-CM: 272.0 Prediabetes     ICD-10-CM: R73.03 
ICD-9-CM: 790.29 Spinal stenosis of lumbosacral region     ICD-10-CM: M48.07 
ICD-9-CM: 724.02 Vitals BP Pulse Temp Resp Height(growth percentile) Weight(growth percentile) 146/79 (BP 1 Location: Right arm, BP Patient Position: Sitting) 69 98.6 °F (37 °C) (Oral) 18 5' 5\" (1.651 m) 148 lb (67.1 kg) SpO2 BMI OB Status Smoking Status 100% 24.63 kg/m2 Hysterectomy Never Smoker BMI and BSA Data Body Mass Index Body Surface Area  
 24.63 kg/m 2 1.75 m 2 Preferred Pharmacy Pharmacy Name Phone [id number] [facility] beds at the [facility] [phone] Your Updated Medication List  
  
   
This list is accurate as of: 9/7/17  5:33 PM.  Always use your most recent med list.  
  
  
  
  
 HYDROcodone-acetaminophen 5-325 mg per tablet Commonly known as:  Helene Denise Take 1 Tab by mouth every four (4) hours as needed for Pain for up to 20 doses. Max Daily Amount: 6 Tabs. Lactobacillus acidophilus 460 mg (20 billion cell) Cap Commonly known as:  Papito Neth Take 1 Tab by mouth daily. ondansetron 4 mg disintegrating tablet Commonly known as:  ZOFRAN ODT Take 1 Tab by mouth every eight (8) hours as needed for Nausea. pneumococcal 23-valent 25 mcg/0.5 mL injection Commonly known as:  PNEUMOVAX 23  
0.5 mL by IntraMUSCular route once for 1 dose. SUMAtriptan 50 mg tablet Commonly known as:  IMITREX Take 1 Tab by mouth once as needed for Migraine for up to 1 dose. TOPAMAX 200 mg tablet Generic drug:  topiramate Take  by mouth nightly. traZODone 100 mg tablet Commonly known as:  Bebo Somerset Take 1 Tab by mouth nightly. Prescriptions Sent to Pharmacy Refills  
 pneumococcal 23-valent (PNEUMOVAX 23) 25 mcg/0.5 mL injection 0 Si.5 mL by IntraMUSCular route once for 1 dose. Class: Normal  
 Pharmacy: [facility]/pharmacy #[id number] #: [phone] Route: IntraMUSCular We Performed the Following HEMOGLOBIN A1C WITH EAG [44518 CPT(R)] LIPID PANEL [87678 CPT(R)] METABOLIC PANEL, BASIC [90660 CPT(R)] [place] COLLECTION VENOUS BLOOD,VENIPUNCTURE [id number] CPT(R)] REFERRAL TO OPHTHALMOLOGY [REF57 Custom] Follow-up Instructions Return in about 6 months (around 3/7/2018). Referral Information Referral ID Referred By Referred To  
  
 [id number] Manuel JENKINS Not Available Visits Status Start Date End Date 1 New Request 17 If your referral has a status of pending review or denied, additional information will be sent to support the outcome of this decision. Introducing [facility] & HEALTH SERVICES!    
 Karo Henry introduces [employer] patient portal. Now you can access parts of your medical record, email your doctor's office, and request medication refills online. 1. In your internet browser, go to [email or web address] 2. Click on the First Time User? Click Here link in the Sign In box. You will see the New Member Sign Up page. 3. Enter your [employer] Access Code exactly as it appears below. You will not need to use this code after youve completed the sign-up process. If you do not sign up before the expiration date, you must request a new code. · [employer] Access Code: [email or web address] Expires: 12/6/2017  5:33 PM 
 
4. Enter the last four digits of your Social Security Number (xxxx) and Date of Birth (mm/dd/yyyy) as indicated and click Submit. You will be taken to the next sign-up page. 5. Create a Loopt ID. This will be your Loopt login ID and cannot be changed, so think of one that is secure and easy to remember. 6. Create a Loopt password. You can change your password at any time. 7. Enter your Password Reset Question and Answer. This can be used at a later time if you forget your password. 8. Enter your e-mail address. You will receive e-mail notification when new information is available in [address] 9. Click Sign Up. You can now view and download portions of your medical record. 10. Click the Download Summary menu link to download a portable copy of your medical information. If you have questions, please visit the Frequently Asked Questions section of the [employer] website. Remember, [employer] is NOT to be used for urgent needs. For medical emergencies, dial 911. Now available from your iPhone and Android! Please provide this summary of care documentation to your next provider. Your primary care clinician is listed as Seth Miner. If you have any questions after today's visit, please call [phone].

## 2017-09-08 LAB
BUN SERPL-MCNC: 12 MG/DL (ref 8–27)
BUN/CREAT SERPL: 10 (ref 12–28)
CALCIUM SERPL-MCNC: 10.1 MG/DL (ref 8.7–10.3)
CHLORIDE SERPL-SCNC: 106 MMOL/L (ref 96–106)
CHOLEST SERPL-MCNC: 251 MG/DL (ref 100–199)
CO2 SERPL-SCNC: 19 MMOL/L (ref 18–29)
CREAT SERPL-MCNC: 1.17 MG/DL (ref 0.57–1)
EST. AVERAGE GLUCOSE BLD GHB EST-MCNC: 114 MG/DL
GLUCOSE SERPL-MCNC: 90 MG/DL (ref 65–99)
HBA1C MFR BLD: 5.6 % (ref 4.8–5.6)
HDLC SERPL-MCNC: 63 MG/DL
LDLC SERPL CALC-MCNC: 172 MG/DL (ref 0–99)
POTASSIUM SERPL-SCNC: 4 MMOL/L (ref 3.5–5.2)
SODIUM SERPL-SCNC: 141 MMOL/L (ref 134–144)
TRIGL SERPL-MCNC: 79 MG/DL (ref 0–149)
VLDLC SERPL CALC-MCNC: 16 MG/DL (ref 5–40)

## 2017-09-08 RX ORDER — ATORVASTATIN CALCIUM 40 MG/1
40 TABLET, FILM COATED ORAL DAILY
Qty: 30 TAB | Refills: 11 | Status: SHIPPED | OUTPATIENT
Start: 2017-09-08 | End: 2017-10-13 | Stop reason: SDUPTHER

## 2017-10-14 RX ORDER — ATORVASTATIN CALCIUM 40 MG/1
40 TABLET, FILM COATED ORAL DAILY
Qty: 90 TAB | Refills: 3 | Status: SHIPPED | OUTPATIENT
Start: 2017-10-14 | End: 2018-07-18 | Stop reason: SDUPTHER

## 2018-03-05 RX ORDER — TRAZODONE HYDROCHLORIDE 100 MG/1
TABLET ORAL
Qty: 180 TAB | Refills: 1 | Status: SHIPPED | OUTPATIENT
Start: 2018-03-05 | End: 2018-03-29 | Stop reason: SDUPTHER

## 2018-03-06 RX ORDER — MOMETASONE FUROATE 50 UG/1
SPRAY, METERED NASAL
Qty: 1 CONTAINER | Refills: 11 | Status: SHIPPED | OUTPATIENT
Start: 2018-03-06 | End: 2022-01-27

## 2018-03-06 RX ORDER — CEFUROXIME AXETIL 250 MG/1
250 TABLET ORAL 2 TIMES DAILY
Qty: 14 TAB | Refills: 0 | Status: SHIPPED | OUTPATIENT
Start: 2018-03-06 | End: 2018-03-13

## 2018-03-29 ENCOUNTER — OFFICE VISIT (OUTPATIENT)
Dept: INTERNAL MEDICINE CLINIC | Age: 68
End: 2018-03-29

## 2018-03-29 VITALS
WEIGHT: 153.8 LBS | RESPIRATION RATE: 16 BRPM | DIASTOLIC BLOOD PRESSURE: 83 MMHG | HEART RATE: 77 BPM | SYSTOLIC BLOOD PRESSURE: 149 MMHG | BODY MASS INDEX: 25.62 KG/M2 | HEIGHT: 65 IN | TEMPERATURE: 99.1 F

## 2018-03-29 DIAGNOSIS — M48.07 SPINAL STENOSIS OF LUMBOSACRAL REGION: ICD-10-CM

## 2018-03-29 DIAGNOSIS — E78.00 HIGH CHOLESTEROL: ICD-10-CM

## 2018-03-29 DIAGNOSIS — G43.909 MIGRAINE WITHOUT STATUS MIGRAINOSUS, NOT INTRACTABLE, UNSPECIFIED MIGRAINE TYPE: ICD-10-CM

## 2018-03-29 DIAGNOSIS — I10 ESSENTIAL HYPERTENSION: Primary | ICD-10-CM

## 2018-03-29 RX ORDER — SODIUM CHLORIDE 0.65 %
AEROSOL, SPRAY (ML) NASAL
Qty: 45 ML | Refills: 11 | Status: SHIPPED | OUTPATIENT
Start: 2018-03-29 | End: 2019-04-23 | Stop reason: SDUPTHER

## 2018-03-29 RX ORDER — SUMATRIPTAN 50 MG/1
50 TABLET, FILM COATED ORAL
Qty: 9 TAB | Refills: 10 | Status: SHIPPED | OUTPATIENT
Start: 2018-03-29 | End: 2018-07-17 | Stop reason: SDUPTHER

## 2018-03-29 RX ORDER — TRAZODONE HYDROCHLORIDE 100 MG/1
100 TABLET ORAL
Qty: 180 TAB | Refills: 1 | Status: SHIPPED | OUTPATIENT
Start: 2018-03-29 | End: 2019-06-12 | Stop reason: SDUPTHER

## 2018-03-29 NOTE — MR AVS SNAPSHOT
[address] [address] [place] 
[phone] Patient: Sybil Barboza MRN: [id number] SVO:[id number] Visit Information Date & Time Provider Department Dept. Phone Encounter #  
 3/29/2018  4:45 PM Sarika Cardona MD SPORTS MED AND PRIMARY CARE - Rick Melgar [phone] [id number] Your Appointments 9/27/2018  4:30 PM  
Any with Sarika Cardona MD  
[address] ([address]) Appt Note: 6 month follow up [address], [place] [address] [facility] [address]  
  
   
 [address], [place] [address] [place] [address] Upcoming Health Maintenance Date Due Pneumococcal 65+ Low/Medium Risk (2 of 2 - PPSV23) 9/7/2018 BREAST CANCER SCRN MAMMOGRAM 5/22/2019 GLAUCOMA SCREENING Q2Y 9/7/2019 COLONOSCOPY 4/3/2024 DTaP/Tdap/Td series (2 - Td) 5/26/2026 Allergies as of 3/29/2018  Review Complete On: 3/29/2018 By: Yin Cronin Severity Noted Reaction Type Reactions Tamiflu [Oseltamivir Phosphate]  02/03/2015    Hives Current Immunizations  Never Reviewed No immunizations on file. Not reviewed this visit You Were Diagnosed With   
  
 Codes Comments Essential hypertension    -  Primary ICD-10-CM: I10 
ICD-9-CM: 401.9 High cholesterol     ICD-10-CM: E78.00 ICD-9-CM: 272.0 Migraine without status migrainosus, not intractable, unspecified migraine type     ICD-10-CM: G43.909 ICD-9-CM: 346.90 Spinal stenosis of lumbosacral region     ICD-10-CM: M48.07 
ICD-9-CM: 724.02 Vitals BP Pulse Temp Resp Height(growth percentile) Weight(growth percentile) 149/83 77 99.1 °F (37.3 °C) (Oral) 16 5' 5\" (1.651 m) 153 lb 12.8 oz (69.8 kg) BMI OB Status Smoking Status 25.59 kg/m2 Hysterectomy Never Smoker BMI and BSA Data Body Mass Index Body Surface Area 25.59 kg/m 2 1.79 m 2 Preferred Pharmacy Pharmacy Name Phone [id number] [phone] [facility] [phone] Your Updated Medication List  
  
   
This list is accurate as of 3/29/18  5:35 PM.  Always use your most recent med list.  
  
  
  
  
 atorvastatin 40 mg tablet Commonly known as:  LIPITOR Take 1 Tab by mouth daily. mometasone 50 mcg/actuation nasal spray Commonly known as:  NASONEX  
USE 2 SPRAYS IN EACH NOSTRIL EVERY DAY  
  
 SUMAtriptan 50 mg tablet Commonly known as:  IMITREX Take 1 Tab by mouth once as needed for Migraine for up to 1 dose. TOPAMAX 200 mg tablet Generic drug:  topiramate Take  by mouth nightly. traZODone 100 mg tablet Commonly known as:  DESYREL  
TAKE 1 TABLET BY MOUTH EVERY NIGHT Introducing [facility] & HEALTH SERVICES! [facility] introduces [employer] patient portal. Now you can access parts of your medical record, email your doctor's office, and request medication refills online. 1. In your internet browser, go to [email or web address] 2. Click on the First Time User? Click Here link in the Sign In box. You will see the New Member Sign Up page. 3. Enter your [employer] Access Code exactly as it appears below. You will not need to use this code after youve completed the sign-up process. If you do not sign up before the expiration date, you must request a new code. · [employer] Access Code: [email or web address] Expires: 6/27/2018  5:35 PM 
 
4. Enter the last four digits of your Social Security Number (xxxx) and Date of Birth (mm/dd/yyyy) as indicated and click Submit. You will be taken to the next sign-up page. 5. Create a [employer] ID. This will be your [employer] login ID and cannot be changed, so think of one that is secure and easy to remember. 6. Create a [employer] password. You can change your password at any time. 7. Enter your Password Reset Question and Answer.  This can be used at a later time if you forget your password. 8. Enter your e-mail address. You will receive e-mail notification when new information is available in [address] 9. Click Sign Up. You can now view and download portions of your medical record. 10. Click the Download Summary menu link to download a portable copy of your medical information. If you have questions, please visit the Frequently Asked Questions section of the [employer] website. Remember, [employer] is NOT to be used for urgent needs. For medical emergencies, dial 911. Now available from your iPhone and Android! Please provide this summary of care documentation to your next provider. Your primary care clinician is listed as Maricruz Baldwin. If you have any questions after today's visit, please call [phone].

## 2018-03-29 NOTE — PROGRESS NOTES
SPORTS MEDICINE AND PRIMARY CARE  Юлия Estrada MD, [address] [address]  Phone:  [phone]  Fax: [phone]      Chief Complaint   Patient presents with    Hypertension     f/u         SUBECTIVE:    Fatou Ames is a 78 y.o. female Patient returns today for follow up with known history of headaches, followed by Dr. Cyn Barnes, depression, dyslipidemia, primary hypertension and prediabetes, and she is seen for evaluation. Since we last saw her she went to the ER at [facility], where  CT scan done for dizziness and has a $10,000 bill. Since we last saw her IRS has put a lien on her social security, as well as VA benefits, because of some issues her and her  have. They are together. Because he has kidney disease and diabetes, he now has insurance. He's not working, she is working part time. Patient is seen for evaluation. Current Outpatient Prescriptions   Medication Sig Dispense Refill    mometasone (NASONEX) 50 mcg/actuation nasal spray USE 2 SPRAYS IN EACH NOSTRIL EVERY DAY 1 Container 11    traZODone (DESYREL) 100 mg tablet TAKE 1 TABLET BY MOUTH EVERY NIGHT 180 Tab 1    atorvastatin (LIPITOR) 40 mg tablet Take 1 Tab by mouth daily. 90 Tab 3    SUMAtriptan (IMITREX) 50 mg tablet Take 1 Tab by mouth once as needed for Migraine for up to 1 dose. 9 Tab 10    topiramate (TOPAMAX) 200 mg tablet Take  by mouth nightly.          Past Medical History:   Diagnosis Date    Bereavement 3/16    father - dementia - age 80    Dense breasts     Depression     Dyslipidemia     Headache     High cholesterol     HTN (hypertension)     Hypertension     Migraines     Prediabetes     Rash     S/P colonoscopy 4-3-14    Sinus congestion     Spinal stenosis     UTI (lower urinary tract infection)      Past Surgical History:   Procedure Laterality Date    HX BREAST BIOPSY Right 2000    neg; surgical bx    HX COLONOSCOPY      HX GYN      HX HYSTERECTOMY       Allergies Allergen Reactions    Tamiflu [Oseltamivir Phosphate] Hives       REVIEW OF SYSTEMS:   No chest pain, no shortness of breath. Social History     Social History    Marital status:      Spouse name: N/A    Number of children: N/A    Years of education: N/A     Social History Main Topics    Smoking status: Never Smoker    Smokeless tobacco: Never Used    Alcohol use No    Drug use: No    Sexual activity: Yes     Partners: Male     Birth control/ protection: None     Other Topics Concern    None     Social History Narrative         Family History: Mother:  76 yrs, Hypertension and diabetes mi,cvaFather: alive 80 yrs, htnDaughter(s): alive 32 yrsSon(s): alive 28    yrs1 son(s) , 1 daughter(s) . Social History: Alcohol Use Patient uses alcohol, Drinks per occasion: 2, Drinks per w [place]: 0. Smoking Status Patient is a never    smoker. Marital Status: . Lives alone.  left suddenly  returned ! Occupation/W ork: not employed. Education/School: has highschool diploma, has college    diploma [address]  Family History   Problem Relation Age of Onset    Stroke Mother        OBJECTIVE:  Visit Vitals    /83    Pulse 77    Temp 99.1 °F (37.3 °C) (Oral)    Resp 16    Ht 5' 5\" (1.651 m)    Wt 153 lb 12.8 oz (69.8 kg)    BMI 25.59 kg/m2     ENT: perrla,  eom intact  NECK: supple. Thyroid normal  CHEST: clear to ascultation and percussion   HEART: regular rate and rhythm  ABD: soft, bowel sounds active  EXTREMITIES: no edema, pulse 1+     No visits with results within 3 Month(s) from this visit.   Latest known visit with results is:    Office Visit on 2017   Component Date Value Ref Range Status    Glucose 2017 90  65 - 99 mg/dL Final    BUN 2017 12  8 - 27 mg/dL Final    Creatinine 2017 1.17* 0.57 - 1.00 mg/dL Final    GFR est non-AA 2017 49* >59 mL/min/1.73 Final    GFR est AA 2017 57* >59 mL/min/1.73 Final    BUN/Creatinine ratio 09/07/2017 10* 12 - 28 Final    Sodium 09/07/2017 141  134 - 144 mmol/L Final    Potassium 09/07/2017 4.0  3.5 - 5.2 mmol/L Final    Chloride 09/07/2017 106  96 - 106 mmol/L Final    CO2 09/07/2017 19  18 - 29 mmol/L Final    Calcium 09/07/2017 10.1  8.7 - 10.3 mg/dL Final    Cholesterol, total 09/07/2017 251* 100 - 199 mg/dL Final    Triglyceride 09/07/2017 79  0 - 149 mg/dL Final    HDL Cholesterol 09/07/2017 63  >39 mg/dL Final    VLDL, calculated 09/07/2017 16  5 - 40 mg/dL Final    LDL, calculated 09/07/2017 172* 0 - 99 mg/dL Final    Hemoglobin A1c 09/07/2017 5.6  4.8 - 5.6 % Final    Comment:          Pre-diabetes: 5.7 - 6.4           Diabetes: >6.4           Glycemic control for adults with diabetes: <7.0      Estimated average glucose 09/07/2017 114  mg/dL Final          ASSESSMENT:  1. Essential hypertension    2. High cholesterol    3. Migraine without status migrainosus, not intractable, unspecified migraine type    4. Spinal stenosis of lumbosacral region      Blood pressure control is adequate, although it is at the upper limits of normal.  Stress is a contributing factor. We counseled her on mechanisms to help her financially with IRS and with her medical problems and finances. She'll return to our office in 4-6 months or as needed. PLAN:  . No orders of the defined types were placed in this encounter. Follow-up Disposition:  Return in about 6 months (around 9/29/2018). ATTENTION:   This medical record was transcribed using an electronic medical records system. Although proofread, it may and can contain electronic and spelling errors. Other human spelling and other errors may be present. Corrections may be executed at a later time. Please feel free to contact us for any clarifications as needed.

## 2018-03-29 NOTE — PROGRESS NOTES
1. Have you been to the ER, urgent care clinic since your last visit? Hospitalized since your last visit? Yes When: 2-10-18    2. Have you seen or consulted any other health care providers outside of the [address] [place] [address] since your last visit? Include any pap smears or colon screening.  Yes When: 2-10-18 Where: [facility] Reason for visit: Vertigo

## 2018-07-17 ENCOUNTER — OFFICE VISIT (OUTPATIENT)
Dept: INTERNAL MEDICINE CLINIC | Age: 68
End: 2018-07-17

## 2018-07-17 VITALS
WEIGHT: 156.2 LBS | TEMPERATURE: 98.1 F | HEIGHT: 65 IN | SYSTOLIC BLOOD PRESSURE: 152 MMHG | RESPIRATION RATE: 16 BRPM | HEART RATE: 80 BPM | DIASTOLIC BLOOD PRESSURE: 91 MMHG | BODY MASS INDEX: 26.02 KG/M2

## 2018-07-17 DIAGNOSIS — G43.909 MIGRAINE WITHOUT STATUS MIGRAINOSUS, NOT INTRACTABLE, UNSPECIFIED MIGRAINE TYPE: ICD-10-CM

## 2018-07-17 DIAGNOSIS — Z13.31 SCREENING FOR DEPRESSION: ICD-10-CM

## 2018-07-17 DIAGNOSIS — Z13.39 SCREENING FOR ALCOHOLISM: ICD-10-CM

## 2018-07-17 DIAGNOSIS — E78.00 HIGH CHOLESTEROL: ICD-10-CM

## 2018-07-17 DIAGNOSIS — Z00.00 MEDICARE ANNUAL WELLNESS VISIT, SUBSEQUENT: Primary | ICD-10-CM

## 2018-07-17 DIAGNOSIS — I10 ESSENTIAL HYPERTENSION: ICD-10-CM

## 2018-07-17 PROBLEM — F32.A MILD DEPRESSION: Status: ACTIVE | Noted: 2018-07-17

## 2018-07-17 RX ORDER — MECLIZINE HYDROCHLORIDE 25 MG/1
25 TABLET ORAL
Qty: 60 TAB | Refills: 11 | Status: SHIPPED | OUTPATIENT
Start: 2018-07-17 | End: 2022-01-27 | Stop reason: ALTCHOICE

## 2018-07-17 RX ORDER — SUMATRIPTAN 50 MG/1
50 TABLET, FILM COATED ORAL
Qty: 9 TAB | Refills: 10 | Status: SHIPPED | OUTPATIENT
Start: 2018-07-17 | End: 2018-07-17

## 2018-07-17 RX ORDER — AMLODIPINE BESYLATE 5 MG/1
5 TABLET ORAL DAILY
Qty: 30 TAB | Refills: 11 | Status: SHIPPED | OUTPATIENT
Start: 2018-07-17 | End: 2019-07-30 | Stop reason: SDUPTHER

## 2018-07-17 RX ORDER — AZITHROMYCIN 250 MG/1
250 TABLET, FILM COATED ORAL SEE ADMIN INSTRUCTIONS
Qty: 6 TAB | Refills: 1 | Status: SHIPPED | OUTPATIENT
Start: 2018-07-17 | End: 2018-07-22

## 2018-07-17 NOTE — ACP (ADVANCE CARE PLANNING)
Advance Care Planning (ACP) Provider Conversation Snapshot    Date of ACP Conversation: 07/17/18  Persons included in Conversation:  patient  Length of ACP Conversation in minutes:  <16 minutes (Non-Billable)    Authorized Decision Maker (if patient is incapable of making informed decisions):    This person is:   Healthcare Agent/Medical Power of  under Advance Directive          For Patients with Decision Making Capacity:   Values/Goals: Exploration of values, goals, and preferences if recovery is not expected, even with continued medical treatment in the event of:  Imminent death    Conversation Outcomes / Follow-Up Plan:   Entered DNR order (If yes, complete Durable DNR form)

## 2018-07-17 NOTE — PATIENT INSTRUCTIONS
Medicare Wellness Visit, Female    The best way to live healthy is to have a lifestyle where you eat a well-balanced diet, exercise regularly, limit alcohol use, and quit all forms of tobacco/nicotine, if applicable. Regular preventive services are another way to keep healthy. Preventive services (vaccines, screening tests, monitoring & exams) can help personalize your care plan, which helps you manage your own care. Screening tests can find health problems at the earliest stages, when they are easiest to treat. [facility] follows the current, evidence-based guidelines published by the [facility] ([facility]) when recommending preventive services for our patients. Because we follow these guidelines, sometimes recommendations change over time as research supports it. (For example, mammograms used to be recommended annually. Even though Medicare will still pay for an annual mammogram, the newer guidelines recommend a mammogram every two years for women of average risk.)    Of course, you and your provider may decide to screen more often for some diseases, based on your risk and co-morbidities (chronic disease you are already diagnosed with). Preventive services for you include:    - Medicare offers their members a free annual wellness visit, which is time for you and your primary care provider to discuss and plan for your preventive service needs. Take advantage of this benefit every year!    -All people over age 72 should receive the recommended pneumonia vaccines. Current USPSTF guidelines recommend a series of two vaccines for the best pneumonia protection.     -All adults should have a yearly flu vaccine and a tetanus vaccine every 10 years. All adults age 61 years should receive a shingles vaccine once in their lifetime.      -A bone mass density test is recommended when a woman turns 65 to screen for osteoporosis.  This test is only recommended once as a screening. Some providers will use this same test as a disease monitoring tool if you already have osteoporosis. -All adults age 38-68 years who are overweight should have a diabetes screening test once every three years.     -Other screening tests & preventive services for persons with diabetes include: an eye exam to screen for diabetic retinopathy, a kidney function test, a foot exam, and stricter control over your cholesterol.     -Cardiovascular screening for adults with routine risk involves an electrocardiogram (ECG) at intervals determined by the provider.     -Colorectal cancer screenings should be done for adults age 54-65 years with normal risk. There are a number of acceptable methods of screening for this type of cancer. Each test has its own benefits and drawbacks. Discuss with your provider what is most appropriate for you during your annual wellness visit. The different tests include: colonoscopy (considered the best screening method), a fecal occult blood test, a fecal DNA test, and sigmoidoscopy. -Breast cancer screenings are recommended every other year for women of normal risk age 54-69 years.     -Cervical cancer screenings for women over age 72 are only recommended with certain risk factors.     -All adults born between [facility] should be screened once for Hepatitis C.      Here is a list of your current Health Maintenance items (your personalized list of preventive services) with a due date:  Health Maintenance Due   Topic Date Due    Annual Well Visit  06/29/2018

## 2018-07-17 NOTE — MR AVS SNAPSHOT
[address] , [place] [address] [place] [address] 
[phone] Patient: Niko Herrera MRN: [id number] NCZ:[id number] Visit Information Date & Time Provider Department Dept. Phone Encounter #  
 7/17/2018  4:30 PM Raquel Prince MD SPORTS MED AND PRIMARY CARE - [facility] [phone] [id number] Follow-up Instructions Return in about 2 weeks (around 7/31/2018) for bp check. Follow-up and Disposition History Your Appointments 9/27/2018  4:30 PM  
Any with Raquel Prince MD  
[facility] Appt Note: 6 month follow up [address], [place] [address] [facility]  
  
   
 [address], [place] [address] [place] [address] Upcoming Health Maintenance Date Due Influenza Age 5 to Adult 8/1/2018 Pneumococcal 65+ Low/Medium Risk (2 of 2 - PPSV23) 9/7/2018 BREAST CANCER SCRN MAMMOGRAM 5/22/2019 MEDICARE YEARLY EXAM 7/18/2019 GLAUCOMA SCREENING Q2Y 9/7/2019 COLONOSCOPY 4/3/2024 DTaP/Tdap/Td series (2 - Td) 5/26/2026 Allergies as of 7/17/2018  Review Complete On: 7/17/2018 By: Raquel Prince MD  
  
 Severity Noted Reaction Type Reactions Tamiflu [Oseltamivir Phosphate]  02/03/2015    Hives Current Immunizations  Never Reviewed No immunizations on file. Not reviewed this visit You Were Diagnosed With   
  
 Codes Comments Medicare annual wellness visit, subsequent    -  Primary ICD-10-CM: Z00.00 ICD-9-CM: V70.0 Screening for alcoholism     ICD-10-CM: Z13.89 ICD-9-CM: V79.1 Screening for depression     ICD-10-CM: Z13.89 ICD-9-CM: V79.0 Essential hypertension     ICD-10-CM: I10 
ICD-9-CM: 401.9 Migraine without status migrainosus, not intractable, unspecified migraine type     ICD-10-CM: G43.909 ICD-9-CM: 346.90 High cholesterol     ICD-10-CM: E78.00 ICD-9-CM: 272.0 Vitals BP Pulse Temp Resp Height(growth percentile) Weight(growth percentile) (!) 152/91 80 98.1 °F (36.7 °C) (Oral) 16 5' 5\" (1.651 m) 156 lb 3.2 oz (70.9 kg) BMI OB Status Smoking Status 25.99 kg/m2 Hysterectomy Never Smoker BMI and BSA Data Body Mass Index Body Surface Area  
 25.99 kg/m 2 1.8 m 2 Preferred Pharmacy Pharmacy Name Phone [facility][id number] [facility] [phone] Your Updated Medication List  
  
   
This list is accurate as of 7/17/18  5:30 PM.  Always use your most recent med list. amLODIPine 5 mg tablet Commonly known as:  Lamas Fanti Take 1 Tab by mouth daily. atorvastatin 40 mg tablet Commonly known as:  LIPITOR Take 1 Tab by mouth daily. azithromycin 250 mg tablet Commonly known as:  Edilia Fine Take 1 Tab by mouth See Admin Instructions for 5 days. meclizine 25 mg tablet Commonly known as:  ANTIVERT Take 1 Tab by mouth three (3) times daily as needed for Dizziness. mometasone 50 mcg/actuation nasal spray Commonly known as:  NASONEX  
USE 2 SPRAYS IN EACH NOSTRIL EVERY DAY  
  
 SALINE NOSE 0.65 % nasal squeeze bottle Generic drug:  sodium chloride USE 2 SPRAYS IN EACH NOSTRIL 4 TIMES DAILY  
  
 SUMAtriptan 50 mg tablet Commonly known as:  IMITREX Take 1 Tab by mouth once as needed for Migraine for up to 1 dose. TOPAMAX 200 mg tablet Generic drug:  topiramate Take  by mouth nightly. traZODone 100 mg tablet Commonly known as:  Terrell Handy Take 1 Tab by mouth nightly. Prescriptions Sent to Pharmacy Refills SUMAtriptan (IMITREX) 50 mg tablet 10 Sig: Take 1 Tab by mouth once as needed for Migraine for up to 1 dose. Class: Normal  
 Pharmacy: [facility]/pharmacy #[id number] Ph #: [phone]  Route: Oral  
 meclizine (ANTIVERT) 25 mg tablet 11 Sig: Take 1 Tab by mouth three (3) times daily as needed for Dizziness. Class: Normal  
 Pharmacy: [facility]/pharmacy #[id number] Ph #: [phone] Route: Oral  
 azithromycin (ZITHROMAX) 250 mg tablet 1 Sig: Take 1 Tab by mouth See Admin Instructions for 5 days. Class: Normal  
 Pharmacy: [facility]/pharmacy #[id number] Ph #: [phone] Route: Oral  
 amLODIPine (NORVASC) 5 mg tablet 11 Sig: Take 1 Tab by mouth daily. Class: Normal  
 Pharmacy: [facility]/pharmacy #[id number] Ph #: [phone] Route: Oral  
  
We Performed the Following CBC WITH AUTOMATED DIFF [81262 CPT(R)] COLLECTION VENOUS BLOOD,VENIPUNCTURE [id number] CPT(R)] [address] [[id number] [facility]] HEMOGLOBIN A1C WITH EAG [95945 CPT(R)] LIPID PANEL [58107 CPT(R)] METABOLIC PANEL, COMPREHENSIVE [26863 CPT(R)] IA ANNUAL ALCOHOL SCREEN 15 MIN [id number] [facility]S] TSH 3RD GENERATION [31571 CPT(R)] URINALYSIS W/ RFLX MICROSCOPIC [54036 CPT(R)] Follow-up Instructions Return in about 2 weeks (around 7/31/2018) for bp check. To-Do List   
 07/17/2018 Imaging:  [facility] MAMMO BI SCREENING INCL CAD   
  
 07/25/2018 3:15 PM  
(Arrive by 3:00 PM) Appointment with [facility] MAM 1 at [facility] ([phone]) Shower or bathe using soap and water. Do not use deodorant, powder, perfumes, or lotion the day of your exam.  If your prior mammograms were not performed at [facility] please bring films with you or forward prior images 2 days before your procedure. Check in at registration 15min before your appointment time unless you were instructed to do otherwise.   A script is not necessary, but if you have one, please bring it on the day of the mammogram or have it faxed to the department. You are responsible for finding a method of transportation to your appointment. If you don't have transportation, please reschedule your appointment at least 24 hours in advance. [facility] [phone] [facility]  [phone] [facility] [address] [facility]  [phone] [facility] [phone] [place] [address] [facility] [phone] Please arrive 15 minutes prior to appointment to register Patient Instructions Medicare Wellness Visit, Female The best way to live healthy is to have a lifestyle where you eat a well-balanced diet, exercise regularly, limit alcohol use, and quit all forms of tobacco/nicotine, if applicable. Regular preventive services are another way to keep healthy. Preventive services (vaccines, screening tests, monitoring & exams) can help personalize your care plan, which helps you manage your own care. Screening tests can find health problems at the earliest stages, when they are easiest to treat. [facility] follows the current, evidence-based guidelines published by the [facility] [place] [facility] (USPSTF) when recommending preventive services for our patients. Because we follow these guidelines, sometimes recommendations change over time as research supports it. (For example, mammograms used to be recommended annually. Even though Medicare will still pay for an annual mammogram, the newer guidelines recommend a mammogram every two years for women of average risk.) Of course, you and your provider may decide to screen more often for some diseases, based on your risk and co-morbidities (chronic disease you are already diagnosed with). Preventive services for you include: - Medicare offers their members a free annual wellness visit, which is time for you and your primary care provider to discuss and plan for your preventive service needs. Take advantage of this benefit every year! -All people over age 72 should receive the recommended pneumonia vaccines. Current USPSTF guidelines recommend a series of two vaccines for the best pneumonia protection.  
 
-All adults should have a yearly flu vaccine and a tetanus vaccine every 10 years. All adults age 61 years should receive a shingles vaccine once in their lifetime.   
 
-A bone mass density test is recommended when a woman turns 65 to screen for osteoporosis. This test is only recommended once as a screening. Some providers will use this same test as a disease monitoring tool if you already have osteoporosis. -All adults age 38-68 years who are overweight should have a diabetes screening test once every three years.  
 
-Other screening tests & preventive services for persons with diabetes include: an eye exam to screen for diabetic retinopathy, a kidney function test, a foot exam, and stricter control over your cholesterol.  
 
-Cardiovascular screening for adults with routine risk involves an electrocardiogram (ECG) at intervals determined by the provider.  
 
-Colorectal cancer screenings should be done for adults age 54-65 years with normal risk. There are a number of acceptable methods of screening for this type of cancer. Each test has its own benefits and drawbacks. Discuss with your provider what is most appropriate for you during your annual wellness visit. The different tests include: colonoscopy (considered the best screening method), a fecal occult blood test, a fecal DNA test, and sigmoidoscopy. -Breast cancer screenings are recommended every other year for women of normal risk age 54-69 years.  
 
-Cervical cancer screenings for women over age 72 are only recommended with certain risk factors.  
 
-All adults born between [facility] should be screened once for Hepatitis C.   
 
Here is a list of your current Health Maintenance items (your personalized list of preventive services) with a due date: 
Health Maintenance Due  
 Topic Date Due  
 Annual Well Visit  06/29/2018 Introducing [address]! [facility] introduces [employer] patient portal. Now you can access parts of your medical record, email your doctor's office, and request medication refills online. 1. In your internet browser, go to [email or web address] 2. Click on the First Time User? Click Here link in the Sign In box. You will see the New Member Sign Up page. 3. Enter your [employer] Access Code exactly as it appears below. You will not need to use this code after youve completed the sign-up process. If you do not sign up before the expiration date, you must request a new code. · [employer] Access Code: [email or web address] Expires: 10/15/2018  5:30 PM 
 
4. Enter the last four digits of your Social Security Number (xxxx) and Date of Birth (mm/dd/yyyy) as indicated and click Submit. You will be taken to the next sign-up page. 5. Create a [employer] ID. This will be your [employer] login ID and cannot be changed, so think of one that is secure and easy to remember. 6. Create a [employer] password. You can change your password at any time. 7. Enter your Password Reset Question and Answer. This can be used at a later time if you forget your password. 8. Enter your e-mail address. You will receive e-mail notification when new information is available in [address] 9. Click Sign Up. You can now view and download portions of your medical record. 10. Click the Download Summary menu link to download a portable copy of your medical information. If you have questions, please visit the Frequently Asked Questions section of the [employer] website. Remember, [employer] is NOT to be used for urgent needs. For medical emergencies, dial 911. Now available from your iPhone and Android! Please provide this summary of care documentation to your next provider. Your primary care clinician is listed as August Beer.  If you have any questions after today's visit, please call [phone].

## 2018-07-17 NOTE — PROGRESS NOTES
This is the Subsequent Medicare Annual Wellness Exam, performed 12 months or more after the Initial AWV or the last Subsequent AWV    I have reviewed the patient's medical history in detail and updated the computerized patient record. History     Past Medical History:   Diagnosis Date    Bereavement 3/16    father - dementia - age 80    Dense breasts     Depression     Dyslipidemia     Headache     High cholesterol     HTN (hypertension)     Hypertension     Migraines     Prediabetes     Rash     S/P colonoscopy 4-3-14    Sinus congestion     Spinal stenosis     UTI (lower urinary tract infection)       Past Surgical History:   Procedure Laterality Date    HX BREAST BIOPSY Right 2000    neg; surgical bx    HX COLONOSCOPY      HX GYN      HX HYSTERECTOMY       Current Outpatient Prescriptions   Medication Sig Dispense Refill    SUMAtriptan (IMITREX) 50 mg tablet Take 1 Tab by mouth once as needed for Migraine for up to 1 dose. 9 Tab 10    meclizine (ANTIVERT) 25 mg tablet Take 1 Tab by mouth three (3) times daily as needed for Dizziness. 60 Tab 11    azithromycin (ZITHROMAX) 250 mg tablet Take 1 Tab by mouth See Admin Instructions for 5 days. 6 Tab 1    amLODIPine (NORVASC) 5 mg tablet Take 1 Tab by mouth daily. 30 Tab 11    SALINE NOSE 0.65 % nasal squeeze bottle USE 2 SPRAYS IN EACH NOSTRIL 4 TIMES DAILY 45 mL 11    traZODone (DESYREL) 100 mg tablet Take 1 Tab by mouth nightly. 180 Tab 1    mometasone (NASONEX) 50 mcg/actuation nasal spray USE 2 SPRAYS IN EACH NOSTRIL EVERY DAY 1 Container 11    atorvastatin (LIPITOR) 40 mg tablet Take 1 Tab by mouth daily. 90 Tab 3    topiramate (TOPAMAX) 200 mg tablet Take  by mouth nightly.          Allergies   Allergen Reactions    Tamiflu [Oseltamivir Phosphate] Hives     Family History   Problem Relation Age of Onset    Stroke Mother      Social History   Substance Use Topics    Smoking status: Never Smoker    Smokeless tobacco: Never Used  Alcohol use No     Patient Active Problem List   Diagnosis Code    Dyslipidemia E78.5    Hypertension I10    Headache R51    Migraines G43.909    High cholesterol E78.00    HTN (hypertension) I10    Depression F32.9    Spinal stenosis M48.00    Prediabetes R73.03    Sinus congestion R09.81    Lower urinary tract infectious disease N39.0    Bereavement Z63.4    Rash R21    Dense breasts R92.2    Mild depression (HCC) F32.0       Depression Risk Factor Screening:     PHQ over the last two weeks 9/9/2014   Little interest or pleasure in doing things Not at all   Feeling down, depressed or hopeless Not at all   Total Score PHQ 2 0     Alcohol Risk Factor Screening: You do not drink alcohol or very rarely. Functional Ability and Level of Safety:   Hearing Loss  Hearing is good. Activities of Daily Living  The home contains: no safety equipment. Patient does total self care    Fall Risk  Fall Risk Assessment, last 12 mths 7/17/2018   Able to walk? Yes   Fall in past 12 months? No       Abuse Screen  Patient is not abused    Cognitive Screening   Evaluation of Cognitive Function:  Has your family/caregiver stated any concerns about your memory: no  Normal    Patient Care Team   Patient Care Team:  Timur Hart MD as PCP - General (Internal Medicine)    Assessment/Plan   Education and counseling provided:  Are appropriate based on today's review and evaluation    Diagnoses and all orders for this visit:    1. Medicare annual wellness visit, subsequent    2. Screening for alcoholism  -     Annual  Alcohol Screen 15 min ()    3. Screening for depression  -     Depression Screen Annual    4.  Essential hypertension  -     URINALYSIS W/ RFLX MICROSCOPIC  -     CBC WITH AUTOMATED DIFF  -     METABOLIC PANEL, COMPREHENSIVE  -     LIPID PANEL  -     TSH 3RD GENERATION  -     COLLECTION VENOUS BLOOD,VENIPUNCTURE  -     HEMOGLOBIN A1C WITH EAG  -     HECTOR MAMMO BI SCREENING INCL CAD; Future    5. Migraine without status migrainosus, not intractable, unspecified migraine type    6. High cholesterol  -     LIPID PANEL    Other orders  -     SUMAtriptan (IMITREX) 50 mg tablet; Take 1 Tab by mouth once as needed for Migraine for up to 1 dose. -     meclizine (ANTIVERT) 25 mg tablet; Take 1 Tab by mouth three (3) times daily as needed for Dizziness. -     azithromycin (ZITHROMAX) 250 mg tablet; Take 1 Tab by mouth See Admin Instructions for 5 days. -     amLODIPine (NORVASC) 5 mg tablet; Take 1 Tab by mouth daily. There are no preventive care reminders to display for this patient. [facility]  Wade Zambrano MD, [address] [address]  Phone:  [phone]  Fax: [phone]      Chief Complaint   Patient presents with    Annual Wellness Visit         SUBECTIVE:    Sandie Miles is a 78 y.o. female Patient returns today with known history of migraine headaches, dyslipidemia, primary hypertension, spinal stenosis and is seen for evaluation. On Sunday she woke up in the morning and had sensation of the room spinning around, things spinning around. She had some Meclizine left, started taking it and now feels a little better. However when she checked her blood pressure at that time it was 156/101. Over the past three weeks she notes that at night primarily she has whelps that become very pruritic. She has some lesions currently. With the rash she feels that her axillary lymph nodes are irritated. She also complains of a sinus infection with painful facial areas, particularly in the maxillary area. Patient is seen for evaluation. Current Outpatient Prescriptions   Medication Sig Dispense Refill    SUMAtriptan (IMITREX) 50 mg tablet Take 1 Tab by mouth once as needed for Migraine for up to 1 dose. 9 Tab 10    meclizine (ANTIVERT) 25 mg tablet Take 1 Tab by mouth three (3) times daily as needed for Dizziness.  60 Tab 11  azithromycin (ZITHROMAX) 250 mg tablet Take 1 Tab by mouth See Admin Instructions for 5 days. 6 Tab 1    amLODIPine (NORVASC) 5 mg tablet Take 1 Tab by mouth daily. 30 Tab 11    SALINE NOSE 0.65 % nasal squeeze bottle USE 2 SPRAYS IN EACH NOSTRIL 4 TIMES DAILY 45 mL 11    traZODone (DESYREL) 100 mg tablet Take 1 Tab by mouth nightly. 180 Tab 1    mometasone (NASONEX) 50 mcg/actuation nasal spray USE 2 SPRAYS IN EACH NOSTRIL EVERY DAY 1 Container 11    atorvastatin (LIPITOR) 40 mg tablet Take 1 Tab by mouth daily. 90 Tab 3    topiramate (TOPAMAX) 200 mg tablet Take  by mouth nightly. Past Medical History:   Diagnosis Date    Bereavement 3/16    father - dementia - age 80    Dense breasts     Depression     Dyslipidemia     Headache     High cholesterol     HTN (hypertension)     Hypertension     Migraines     Prediabetes     Rash     S/P colonoscopy 4-3-14    Sinus congestion     Spinal stenosis     UTI (lower urinary tract infection)      Past Surgical History:   Procedure Laterality Date    HX BREAST BIOPSY Right     neg; surgical bx    HX COLONOSCOPY      HX GYN      HX HYSTERECTOMY       Allergies   Allergen Reactions    Tamiflu [Oseltamivir Phosphate] Hives       REVIEW OF SYSTEMS:   No chest pain, no shortness of breath. Social History     Social History    Marital status:      Spouse name: N/A    Number of children: N/A    Years of education: N/A     Social History Main Topics    Smoking status: Never Smoker    Smokeless tobacco: Never Used    Alcohol use No    Drug use: No    Sexual activity: Yes     Partners: Male     Birth control/ protection: None     Other Topics Concern    None     Social History Narrative         Family History: Mother:  76 yrs, Hypertension and diabetes mi,cvaFather: alive 80 yrs, htnDaughter(s): alive 32 yrsSon(s): alive 28    yrs1 son(s) , 1 daughter(s) .     Social History: Alcohol Use Patient uses alcohol, Drinks per occasion: 2, Drinks per w [place]: 0. Smoking Status Patient is a never    smoker. Marital Status: . Lives alone.  left suddenly 2015 returned 2017! Occupation/W ork: not employed. Education/School: has highschool diploma, has college    diploma [address]  Family History   Problem Relation Age of Onset    Stroke Mother        OBJECTIVE:  Visit Vitals    BP (!) 152/91    Pulse 80    Temp 98.1 °F (36.7 °C) (Oral)    Resp 16    Ht 5' 5\" (1.651 m)    Wt 156 lb 3.2 oz (70.9 kg)    BMI 25.99 kg/m2     ENT: perrla,  eom intact  NECK: supple. Thyroid normal  CHEST: clear to ascultation and percussion   HEART: regular rate and rhythm  ABD: soft, bowel sounds active  EXTREMITIES: no edema, pulse 1+     No visits with results within 3 Month(s) from this visit.   Latest known visit with results is:    Office Visit on 09/07/2017   Component Date Value Ref Range Status    Glucose 09/07/2017 90  65 - 99 mg/dL Final    BUN 09/07/2017 12  8 - 27 mg/dL Final    Creatinine 09/07/2017 1.17* 0.57 - 1.00 mg/dL Final    GFR est non-AA 09/07/2017 49* >59 mL/min/1.73 Final    GFR est AA 09/07/2017 57* >59 mL/min/1.73 Final    BUN/Creatinine ratio 09/07/2017 10* 12 - 28 Final    Sodium 09/07/2017 141  134 - 144 mmol/L Final    Potassium 09/07/2017 4.0  3.5 - 5.2 mmol/L Final    Chloride 09/07/2017 106  96 - 106 mmol/L Final    CO2 09/07/2017 19  18 - 29 mmol/L Final    Calcium 09/07/2017 10.1  8.7 - 10.3 mg/dL Final    Cholesterol, total 09/07/2017 251* 100 - 199 mg/dL Final    Triglyceride 09/07/2017 79  0 - 149 mg/dL Final    HDL Cholesterol 09/07/2017 63  >39 mg/dL Final    VLDL, calculated 09/07/2017 16  5 - 40 mg/dL Final    LDL, calculated 09/07/2017 172* 0 - 99 mg/dL Final    Hemoglobin A1c 09/07/2017 5.6  4.8 - 5.6 % Final    Comment:          Pre-diabetes: 5.7 - 6.4           Diabetes: >6.4           Glycemic control for adults with diabetes: <7.0      Estimated average glucose 09/07/2017 114  mg/dL Final          ASSESSMENT:  1. Medicare annual wellness visit, subsequent    2. Screening for alcoholism    3. Screening for depression    4. Essential hypertension    5. Migraine without status migrainosus, not intractable, unspecified migraine type    6. High cholesterol      He would like to use Hydroxyzine for the pruritus, unfortunately it has drug interactions with other drugs and therefore we don't give the rx. We do renew the Meclizine and we do renew the Sumatriptan. She was concerned about lymph nodes, which are not present currently. BP remains elevated and will titrate her antihypertensives accordingly. She'll return to the office in two weeks, primarily so we can look at her blood pressure so she will come in for blood pressure check. I have discussed the diagnosis with the patient and the intended plan as seen in the  orders above. The patient understands and agees with the plan. The patient has   received an after visit summary and questions were answered concerning  future plans  Patient labs and/or xrays were reviewed  Past records were reviewed. PLAN:  .  Orders Placed This Encounter    Depression Screen Annual    HECTOR MAMMO BI SCREENING INCL CAD    URINALYSIS W/ RFLX MICROSCOPIC    CBC WITH AUTOMATED DIFF    METABOLIC PANEL, COMPREHENSIVE    LIPID PANEL    TSH 3RD GENERATION    HEMOGLOBIN A1C WITH EAG    SUMAtriptan (IMITREX) 50 mg tablet    meclizine (ANTIVERT) 25 mg tablet    azithromycin (ZITHROMAX) 250 mg tablet    amLODIPine (NORVASC) 5 mg tablet       Follow-up Disposition:  Return in about 2 weeks (around 7/31/2018) for bp check. ATTENTION:   This medical record was transcribed using an electronic medical records system. Although proofread, it may and can contain electronic and spelling errors. Other human spelling and other errors may be present. Corrections may be executed at a later time. Please feel free to contact us for any clarifications as needed.

## 2018-07-18 DIAGNOSIS — I10 ESSENTIAL HYPERTENSION: Primary | ICD-10-CM

## 2018-07-18 LAB
ALBUMIN SERPL-MCNC: 4.3 G/DL (ref 3.6–4.8)
ALBUMIN/GLOB SERPL: 1.7 {RATIO} (ref 1.2–2.2)
ALP SERPL-CCNC: 48 IU/L (ref 39–117)
ALT SERPL-CCNC: 11 IU/L (ref 0–32)
APPEARANCE UR: CLEAR
AST SERPL-CCNC: 19 IU/L (ref 0–40)
BASOPHILS # BLD AUTO: 0 X10E3/UL (ref 0–0.2)
BASOPHILS NFR BLD AUTO: 1 %
BILIRUB SERPL-MCNC: 0.3 MG/DL (ref 0–1.2)
BILIRUB UR QL STRIP: NEGATIVE
BUN SERPL-MCNC: 9 MG/DL (ref 8–27)
BUN/CREAT SERPL: 8 (ref 12–28)
CALCIUM SERPL-MCNC: 9.7 MG/DL (ref 8.7–10.3)
CHLORIDE SERPL-SCNC: 109 MMOL/L (ref 96–106)
CHOLEST SERPL-MCNC: 244 MG/DL (ref 100–199)
CO2 SERPL-SCNC: 18 MMOL/L (ref 20–29)
COLOR UR: YELLOW
CREAT SERPL-MCNC: 1.11 MG/DL (ref 0.57–1)
EOSINOPHIL # BLD AUTO: 0.2 X10E3/UL (ref 0–0.4)
EOSINOPHIL NFR BLD AUTO: 4 %
ERYTHROCYTE [DISTWIDTH] IN BLOOD BY AUTOMATED COUNT: 14 % (ref 12.3–15.4)
EST. AVERAGE GLUCOSE BLD GHB EST-MCNC: 114 MG/DL
GLOBULIN SER CALC-MCNC: 2.5 G/DL (ref 1.5–4.5)
GLUCOSE SERPL-MCNC: 91 MG/DL (ref 65–99)
GLUCOSE UR QL: NEGATIVE
HBA1C MFR BLD: 5.6 % (ref 4.8–5.6)
HCT VFR BLD AUTO: 38.1 % (ref 34–46.6)
HDLC SERPL-MCNC: 54 MG/DL
HGB BLD-MCNC: 12.9 G/DL (ref 11.1–15.9)
HGB UR QL STRIP: NEGATIVE
IMM GRANULOCYTES # BLD: 0 X10E3/UL (ref 0–0.1)
IMM GRANULOCYTES NFR BLD: 0 %
KETONES UR QL STRIP: NEGATIVE
LDLC SERPL CALC-MCNC: 165 MG/DL (ref 0–99)
LEUKOCYTE ESTERASE UR QL STRIP: NEGATIVE
LYMPHOCYTES # BLD AUTO: 2 X10E3/UL (ref 0.7–3.1)
LYMPHOCYTES NFR BLD AUTO: 42 %
MCH RBC QN AUTO: 29.5 PG (ref 26.6–33)
MCHC RBC AUTO-ENTMCNC: 33.9 G/DL (ref 31.5–35.7)
MCV RBC AUTO: 87 FL (ref 79–97)
MICRO URNS: NORMAL
MONOCYTES # BLD AUTO: 0.3 X10E3/UL (ref 0.1–0.9)
MONOCYTES NFR BLD AUTO: 7 %
NEUTROPHILS # BLD AUTO: 2.1 X10E3/UL (ref 1.4–7)
NEUTROPHILS NFR BLD AUTO: 46 %
NITRITE UR QL STRIP: NEGATIVE
PH UR STRIP: 6.5 [PH] (ref 5–7.5)
PLATELET # BLD AUTO: 348 X10E3/UL (ref 150–379)
POTASSIUM SERPL-SCNC: 3.9 MMOL/L (ref 3.5–5.2)
PROT SERPL-MCNC: 6.8 G/DL (ref 6–8.5)
PROT UR QL STRIP: NEGATIVE
RBC # BLD AUTO: 4.37 X10E6/UL (ref 3.77–5.28)
SODIUM SERPL-SCNC: 142 MMOL/L (ref 134–144)
SP GR UR: 1.01 (ref 1–1.03)
TRIGL SERPL-MCNC: 124 MG/DL (ref 0–149)
TSH SERPL DL<=0.005 MIU/L-ACNC: 1.11 UIU/ML (ref 0.45–4.5)
UROBILINOGEN UR STRIP-MCNC: 0.2 MG/DL (ref 0.2–1)
VLDLC SERPL CALC-MCNC: 25 MG/DL (ref 5–40)
WBC # BLD AUTO: 4.7 X10E3/UL (ref 3.4–10.8)

## 2018-07-18 RX ORDER — ATORVASTATIN CALCIUM 80 MG/1
80 TABLET, FILM COATED ORAL DAILY
Qty: 30 TAB | Refills: 11 | Status: SHIPPED | OUTPATIENT
Start: 2018-07-18 | End: 2018-11-02 | Stop reason: SDUPTHER

## 2018-07-25 ENCOUNTER — HOSPITAL ENCOUNTER (OUTPATIENT)
Dept: MAMMOGRAPHY | Age: 68
Discharge: HOME | End: 2018-07-25
Attending: INTERNAL MEDICINE
Payer: MEDICARE

## 2018-07-25 DIAGNOSIS — Z12.31 VISIT FOR SCREENING MAMMOGRAM: ICD-10-CM

## 2018-07-25 PROCEDURE — 77067 SCR MAMMO BI INCL CAD: CPT

## 2018-07-25 PROCEDURE — 77063 BREAST TOMOSYNTHESIS BI: CPT

## 2018-07-31 ENCOUNTER — CLINICAL SUPPORT (OUTPATIENT)
Dept: INTERNAL MEDICINE CLINIC | Age: 68
End: 2018-07-31

## 2018-07-31 VITALS
BODY MASS INDEX: 25.73 KG/M2 | SYSTOLIC BLOOD PRESSURE: 130 MMHG | DIASTOLIC BLOOD PRESSURE: 75 MMHG | WEIGHT: 154.6 LBS | HEART RATE: 76 BPM

## 2018-07-31 DIAGNOSIS — I10 ESSENTIAL HYPERTENSION: Primary | ICD-10-CM

## 2018-07-31 NOTE — PROGRESS NOTES
Ami Comes  in for BP check.  She  states that she  takes amlodipine 5 mg 1 tab PO daily  /75  Pulse 76  Wt 154 lb 9.6 oz (70.1 kg)  BMI 25.73 kg/m2  Patient instructed to continue taking medication as prescribed  RTO in 6 months  for doctors visit per Dr. Augustus Dugan LPN

## 2018-07-31 NOTE — MR AVS SNAPSHOT
[address] [place], [place] [address] [address] [facility] [place] 
[phone] Patient: Alex Mcmanus MRN: [id number] DYR:[birth year] Visit Information Date & Time Provider Department Dept. Phone Encounter #  
 7/31/2018  4:00 PM Angel Dawn MD [facility] - [address] [phone] [id number] Your Appointments 9/27/2018  4:30 PM  
Any with Angel Dawn MD  
[address] [facility] ([facility]) Appt Note: 6 month follow up [address], [place] [address] [place] [address]  
  
   
 [address], [place] [address] [address] [facility] [place] Upcoming Health Maintenance Date Due Influenza Age 5 to Adult 8/1/2018 Pneumococcal 65+ Low/Medium Risk (2 of 2 - PPSV23) 9/7/2018 MEDICARE YEARLY EXAM 7/18/2019 GLAUCOMA SCREENING Q2Y 9/7/2019 BREAST CANCER SCRN MAMMOGRAM 7/25/2020 COLONOSCOPY 4/3/2024 DTaP/Tdap/Td series (2 - Td) 5/26/2026 Allergies as of 7/31/2018  Review Complete On: 7/17/2018 By: Angel Dawn MD  
  
 Severity Noted Reaction Type Reactions Tamiflu [Oseltamivir Phosphate]  02/03/2015    Hives Current Immunizations  Never Reviewed No immunizations on file. Not reviewed this visit Vitals BP Pulse Weight(growth percentile) BMI OB Status Smoking Status 130/75 76 154 lb 9.6 oz (70.1 kg) 25.73 kg/m2 Hysterectomy Never Smoker BMI and BSA Data Body Mass Index Body Surface Area 25.73 kg/m 2 1.79 m 2 Preferred Pharmacy Pharmacy Name Phone [id number] [phone] Your Updated Medication List  
  
   
This list is accurate as of 7/31/18  4:30 PM.  Always use your most recent med list. amLODIPine 5 mg tablet Commonly known as:  Lennis Eagles Take 1 Tab by mouth daily. atorvastatin 80 mg tablet Commonly known as:  LIPITOR Take 1 Tab by mouth daily. meclizine 25 mg tablet Commonly known as:  ANTIVERT Take 1 Tab by mouth three (3) times daily as needed for Dizziness. mometasone 50 mcg/actuation nasal spray Commonly known as:  NASONEX  
USE 2 SPRAYS IN EACH NOSTRIL EVERY DAY  
  
 SALINE NOSE 0.65 % nasal squeeze bottle Generic drug:  sodium chloride USE 2 SPRAYS IN EACH NOSTRIL 4 TIMES DAILY  
  
 TOPAMAX 200 mg tablet Generic drug:  topiramate Take  by mouth nightly. traZODone 100 mg tablet Commonly known as:  Terrell Handy Take 1 Tab by mouth nightly. Introducing [facility]! Ra Hussein introduces [employer] patient portal. Now you can access parts of your medical record, email your doctor's office, and request medication refills online. 1. In your internet browser, go to [email or web address] 2. Click on the First Time User? Click Here link in the Sign In box. You will see the New Member Sign Up page. 3. Enter your [employer] Access Code exactly as it appears below. You will not need to use this code after youve completed the sign-up process. If you do not sign up before the expiration date, you must request a new code. · [employer] Access Code: [email or web address] Expires: 10/15/2018  5:30 PM 
 
4. Enter the last four digits of your Social Security Number (xxxx) and Date of Birth (mm/dd/yyyy) as indicated and click Submit. You will be taken to the next sign-up page. 5. Create a [employer] ID. This will be your [employer] login ID and cannot be changed, so think of one that is secure and easy to remember. 6. Create a [employer] password. You can change your password at any time. 7. Enter your Password Reset Question and Answer. This can be used at a later time if you forget your password. 8. Enter your e-mail address. You will receive e-mail notification when new information is available in [address] 9. Click Sign Up. You can now view and download portions of your medical record. 10. Click the Download Summary menu link to download a portable copy of your medical information. If you have questions, please visit the Frequently Asked Questions section of the [employer] website. Remember, [employer] is NOT to be used for urgent needs. For medical emergencies, dial 911. Now available from your iPhone and Android! Please provide this summary of care documentation to your next provider. Your primary care clinician is listed as Deisy Ragsdale. If you have any questions after today's visit, please call [phone].

## 2018-11-02 RX ORDER — ATORVASTATIN CALCIUM 80 MG/1
80 TABLET, FILM COATED ORAL DAILY
Qty: 90 TAB | Refills: 3 | Status: SHIPPED | OUTPATIENT
Start: 2018-11-02 | End: 2019-11-17 | Stop reason: SDUPTHER

## 2018-11-23 ENCOUNTER — DOCUMENTATION ONLY (OUTPATIENT)
Dept: INTERNAL MEDICINE CLINIC | Age: 68
End: 2018-11-23

## 2019-01-29 ENCOUNTER — OFFICE VISIT (OUTPATIENT)
Dept: INTERNAL MEDICINE CLINIC | Age: 69
End: 2019-01-29

## 2019-01-29 VITALS
OXYGEN SATURATION: 100 % | DIASTOLIC BLOOD PRESSURE: 80 MMHG | TEMPERATURE: 99 F | SYSTOLIC BLOOD PRESSURE: 136 MMHG | HEART RATE: 76 BPM | WEIGHT: 156.6 LBS | RESPIRATION RATE: 18 BRPM | BODY MASS INDEX: 26.09 KG/M2 | HEIGHT: 65 IN

## 2019-01-29 DIAGNOSIS — R73.03 PREDIABETES: ICD-10-CM

## 2019-01-29 DIAGNOSIS — M48.07 SPINAL STENOSIS OF LUMBOSACRAL REGION: ICD-10-CM

## 2019-01-29 DIAGNOSIS — I10 ESSENTIAL HYPERTENSION: Primary | ICD-10-CM

## 2019-01-29 DIAGNOSIS — E78.5 DYSLIPIDEMIA: ICD-10-CM

## 2019-01-29 RX ORDER — AMOXICILLIN AND CLAVULANATE POTASSIUM 875; 125 MG/1; MG/1
1 TABLET, FILM COATED ORAL 2 TIMES DAILY
Qty: 20 TAB | Refills: 0 | Status: SHIPPED | OUTPATIENT
Start: 2019-01-29 | End: 2019-02-08

## 2019-01-29 RX ORDER — FLUCONAZOLE 150 MG/1
150 TABLET ORAL DAILY
Qty: 2 TAB | Refills: 1 | Status: SHIPPED | OUTPATIENT
Start: 2019-01-29 | End: 2019-01-30

## 2019-01-29 NOTE — PROGRESS NOTES
1. Have you been to the ER, urgent care clinic since your last visit? Hospitalized since your last visit? Yes When: 11-18-18 Reason for visit: sinus infection 2. Have you seen or consulted any other health care providers outside of the [address] [place] [address] since your last visit? Include any pap smears or colon screening. Yes Where: [facility] Cold symptoms

## 2019-01-29 NOTE — PROGRESS NOTES
[facility] Alo Mcclain MD, [address] [place] [address] Phone:  [phone]  Fax: [phone] Chief Complaint Patient presents with  Hypertension Dara Patiño SUBJECTIVE: 
  Jesse Babcock is a 80 y.o. female Patient returns today with known history of dyslipidemia, primary hypertension, depression, headaches, prediabetes, spinal stenosis, and is seen for evaluation. Patient returns today complaining of sinus infection. She has mucoid mucopurulent drainage and facial pain. Patient is seen for evaluation. Current Outpatient Medications Medication Sig Dispense Refill  amoxicillin-clavulanate (AUGMENTIN) 875-125 mg per tablet Take 1 Tab by mouth two (2) times a day for 10 days. 20 Tab 0  
 fluconazole (DIFLUCAN) 150 mg tablet Take 1 Tab by mouth daily for 1 day. 2 Tab 1  
 atorvastatin (LIPITOR) 80 mg tablet Take 1 Tab by mouth daily. 90 Tab 3  
 meclizine (ANTIVERT) 25 mg tablet Take 1 Tab by mouth three (3) times daily as needed for Dizziness. 60 Tab 11  
 amLODIPine (NORVASC) 5 mg tablet Take 1 Tab by mouth daily. 30 Tab 11  
 SALINE NOSE 0.65 % nasal squeeze bottle USE 2 SPRAYS IN EACH NOSTRIL 4 TIMES DAILY 45 mL 11  
 traZODone (DESYREL) 100 mg tablet Take 1 Tab by mouth nightly. 180 Tab 1  
 mometasone (NASONEX) 50 mcg/actuation nasal spray USE 2 SPRAYS IN EACH NOSTRIL EVERY DAY 1 Container 11  
 topiramate (TOPAMAX) 200 mg tablet Take  by mouth nightly. Past Medical History:  
Diagnosis Date  Bereavement 3/16  
 father - dementia - age 80  Dense breasts  Depression  Dyslipidemia  Headache  High cholesterol  HTN (hypertension)  Hypertension  Migraines  Prediabetes  Rash  S/P colonoscopy 4-3-14  Sinus congestion  Spinal stenosis  UTI (lower urinary tract infection) Past Surgical History:  
Procedure Laterality Date  HX BREAST BIOPSY Right 2000 neg; surgical bx  HX COLONOSCOPY    
 HX GYN    
 HX HYSTERECTOMY Allergies Allergen Reactions  Tamiflu [Oseltamivir Phosphate] Hives REVIEW OF SYSTEMS: 
General: negative for - chills or fever ENT: negative for - headaches, nasal congestion or tinnitus Respiratory: negative for - cough, hemoptysis, shortness of breath or wheezing Cardiovascular : negative for - chest pain, edema, palpitations or shortness of breath Gastrointestinal: negative for - abdominal pain, blood in stools, heartburn or nausea/vomiting Genito-Urinary: no dysuria, trouble voiding, or hematuria Musculoskeletal: negative for - gait disturbance, joint pain, joint stiffness or joint swelling Neurological: no TIA or stroke symptoms Hematologic: no bruises, no bleeding, no swollen glands Integument: no lumps, mole changes, nail changes or rash Endocrine: no malaise/lethargy or unexpected weight changes Social History Socioeconomic History  Marital status:  Spouse name: Not on file  Number of children: Not on file  Years of education: Not on file  Highest education level: Not on file Tobacco Use  Smoking status: Never Smoker  Smokeless tobacco: Never Used Substance and Sexual Activity  Alcohol use: No  
 Drug use: No  
 Sexual activity: Not Currently Partners: Male Birth control/protection: None Social History Narrative Family History: Mother:  76 yrs, Hypertension and diabetes mi,cvaFather: alive 80 yrs, htnDaughter(s): alive 32 yrsSon(s): alive 28  
 yrs1 son(s) , 1 daughter(s) . Social History: Alcohol Use Patient uses alcohol, Drinks per occasion: 2, Drinks per w [place]: 0. Smoking Status Patient is a never  
 smoker. Marital Status: . Lives alone.  left suddenly  returned ! Occupation/W ork: not employed. Education/School: has highschool diploma, has college  
 diploma [address] [facility] Family History Problem Relation Age of Onset  Stroke Mother OBJECTIVE: 
 
Visit Vitals /80 Pulse 76 Temp 99 °F (37.2 °C) (Oral) Resp 18 Ht 5' 5\" (1.651 m) Wt 156 lb 9.6 oz (71 kg) SpO2 100% BMI 26.06 kg/m² CONSTITUTIONAL: well , well nourished, appears age appropriate EYES: perrla, eom intact ENMT:moist mucous membranes, pharynx clear NECK: supple. Thyroid normal 
RESPIRATORY: Chest: clear bilaterally CARDIOVASCULAR: Heart: regular rate and rhythm GASTROINTESTINAL: Abdomen: soft, bowel sounds active HEMATOLOGIC: no pathological lymph nodes palpated MUSCULOSKELETAL: Extremities: no edema, pulse 1+ INTEGUMENT: No unusual rashes or suspicious skin lesions noted. Nails appear normal. 
NEUROLOGIC: non-focal exam  
MENTAL STATUS: alert and oriented, appropriate affect ASSESSMENT: 
1. Essential hypertension 2. Dyslipidemia 3. Spinal stenosis of lumbosacral region 4. Prediabetes Patient was doing very good with her weight this summer, she tells me. She was walking on a regular basis and as the temperatures change she has had to curtail her physical activity. We encouraged her to pick it up again. Her blood pressure control is at goal. 
 
For her sinusitis we will give her Augmentin, suggest she use a probiotic with it. We will also check her renal function, as well as hemoglobin A1c. She will be back to see us for her yearly in July. She is invited to walk in to see us any time should she have an urgency and unable to get an appointment. I have discussed the diagnosis with the patient and the intended plan as seen in the 
orders above. The patient understands and agees with the plan. The patient has  
received an after visit summary and questions were answered concerning 
future plans Patient labs and/or xrays were reviewed Past records were reviewed. PLAN: 
. Orders Placed This Encounter  RENAL FUNCTION PANEL  
  HEMOGLOBIN A1C WITH EAG  
 amoxicillin-clavulanate (AUGMENTIN) 875-125 mg per tablet  fluconazole (DIFLUCAN) 150 mg tablet Follow-up Disposition: 
Return in about 6 months (around 7/29/2019). ATTENTION:  
This medical record was transcribed using an electronic medical records system. Although proofread, it may and can contain electronic and spelling errors. Other human spelling and other errors may be present. Corrections may be executed at a later time. Please feel free to contact us for any clarifications as needed.

## 2019-01-30 LAB
ALBUMIN SERPL-MCNC: 4.6 G/DL (ref 3.6–4.8)
BUN SERPL-MCNC: 11 MG/DL (ref 8–27)
BUN/CREAT SERPL: 9 (ref 12–28)
CALCIUM SERPL-MCNC: 10 MG/DL (ref 8.7–10.3)
CHLORIDE SERPL-SCNC: 108 MMOL/L (ref 96–106)
CO2 SERPL-SCNC: 23 MMOL/L (ref 20–29)
CREAT SERPL-MCNC: 1.19 MG/DL (ref 0.57–1)
EST. AVERAGE GLUCOSE BLD GHB EST-MCNC: 120 MG/DL
GLUCOSE SERPL-MCNC: 86 MG/DL (ref 65–99)
HBA1C MFR BLD: 5.8 % (ref 4.8–5.6)
PHOSPHATE SERPL-MCNC: 3.2 MG/DL (ref 2.5–4.5)
POTASSIUM SERPL-SCNC: 4 MMOL/L (ref 3.5–5.2)
SODIUM SERPL-SCNC: 144 MMOL/L (ref 134–144)

## 2019-02-28 RX ORDER — AZITHROMYCIN 250 MG/1
TABLET, FILM COATED ORAL
Qty: 6 TAB | Refills: 0 | Status: SHIPPED | OUTPATIENT
Start: 2019-02-28 | End: 2019-03-05

## 2019-04-23 RX ORDER — SODIUM CHLORIDE 0.65 %
AEROSOL, SPRAY (ML) NASAL
Qty: 44 ML | Refills: 11 | Status: SHIPPED | OUTPATIENT
Start: 2019-04-23

## 2019-06-12 RX ORDER — TRAZODONE HYDROCHLORIDE 100 MG/1
100 TABLET ORAL
Qty: 180 TAB | Refills: 1 | Status: SHIPPED | OUTPATIENT
Start: 2019-06-12 | End: 2020-06-03

## 2019-07-30 ENCOUNTER — OFFICE VISIT (OUTPATIENT)
Dept: INTERNAL MEDICINE CLINIC | Age: 69
End: 2019-07-30

## 2019-07-30 VITALS
DIASTOLIC BLOOD PRESSURE: 82 MMHG | BODY MASS INDEX: 25.52 KG/M2 | OXYGEN SATURATION: 99 % | SYSTOLIC BLOOD PRESSURE: 138 MMHG | HEART RATE: 71 BPM | TEMPERATURE: 98.6 F | RESPIRATION RATE: 18 BRPM | WEIGHT: 153.2 LBS | HEIGHT: 65 IN

## 2019-07-30 DIAGNOSIS — M48.07 SPINAL STENOSIS OF LUMBOSACRAL REGION: ICD-10-CM

## 2019-07-30 DIAGNOSIS — I10 ESSENTIAL HYPERTENSION: ICD-10-CM

## 2019-07-30 DIAGNOSIS — E78.5 DYSLIPIDEMIA: ICD-10-CM

## 2019-07-30 DIAGNOSIS — F32.A MILD DEPRESSION: ICD-10-CM

## 2019-07-30 DIAGNOSIS — Z13.39 SCREENING FOR ALCOHOLISM: ICD-10-CM

## 2019-07-30 DIAGNOSIS — R79.9 ABNORMAL FINDING OF BLOOD CHEMISTRY: ICD-10-CM

## 2019-07-30 DIAGNOSIS — R73.03 PREDIABETES: ICD-10-CM

## 2019-07-30 DIAGNOSIS — Z00.00 MEDICARE ANNUAL WELLNESS VISIT, SUBSEQUENT: Primary | ICD-10-CM

## 2019-07-30 DIAGNOSIS — Z12.31 ENCOUNTER FOR SCREENING MAMMOGRAM FOR MALIGNANT NEOPLASM OF BREAST: ICD-10-CM

## 2019-07-30 DIAGNOSIS — Z13.31 SCREENING FOR DEPRESSION: ICD-10-CM

## 2019-07-30 PROBLEM — N18.30 CKD (CHRONIC KIDNEY DISEASE), STAGE III (HCC): Status: ACTIVE | Noted: 2017-03-16

## 2019-07-30 RX ORDER — EPINEPHRINE 0.3 MG/.3ML
0.3 INJECTION SUBCUTANEOUS
Qty: 1 SYRINGE | Refills: 11 | Status: SHIPPED | OUTPATIENT
Start: 2019-07-30 | End: 2019-07-30

## 2019-07-30 RX ORDER — AMLODIPINE BESYLATE 5 MG/1
5 TABLET ORAL DAILY
Qty: 30 TAB | Refills: 11 | Status: SHIPPED | OUTPATIENT
Start: 2019-07-30 | End: 2021-10-21

## 2019-07-30 RX ORDER — AMLODIPINE BESYLATE 5 MG/1
5 TABLET ORAL DAILY
Qty: 30 TAB | Refills: 11 | Status: SHIPPED | OUTPATIENT
Start: 2019-07-30 | End: 2019-07-30 | Stop reason: SDUPTHER

## 2019-07-30 RX ORDER — SUMATRIPTAN 100 MG/1
100 TABLET, FILM COATED ORAL
COMMUNITY

## 2019-07-30 NOTE — PROGRESS NOTES
Chief Complaint   Patient presents with    Annual Wellness Visit    Urinary Frequency     1. Have you been to the ER, urgent care clinic since your last visit? Hospitalized since your last visit? No    2. Have you seen or consulted any other health care providers outside of the [address] [place] [address] since your last visit? Include any pap smears or colon screening. No     Pt would like to discuss getting an epi-pen   This is the Subsequent Medicare Annual Wellness Exam, performed 12 months or more after the Initial AWV or the last Subsequent AWV    I have reviewed the patient's medical history in detail and updated the computerized patient record. History     Past Medical History:   Diagnosis Date    Bereavement 3/16    father - dementia - age 80    Dense breasts     Depression     Dyslipidemia     Headache     High cholesterol     HTN (hypertension)     Hypertension     Migraines     Prediabetes     Rash     S/P colonoscopy 4-3-14    Sinus congestion     Spinal stenosis     UTI (lower urinary tract infection)       Past Surgical History:   Procedure Laterality Date    HX BREAST BIOPSY Right 2000    neg; surgical bx    HX COLONOSCOPY      HX GYN      HX HYSTERECTOMY       Current Outpatient Medications   Medication Sig Dispense Refill    SUMAtriptan (IMITREX) 100 mg tablet Take 100 mg by mouth once as needed for Migraine.  traZODone (DESYREL) 100 mg tablet Take 1 Tab by mouth nightly. 180 Tab 1    SALINE NOSE 0.65 % nasal squeeze bottle USE 2 SPRAYS IN EACH NOSTRIL 4 TIMES DAILY 44 mL 11    atorvastatin (LIPITOR) 80 mg tablet Take 1 Tab by mouth daily. 90 Tab 3    meclizine (ANTIVERT) 25 mg tablet Take 1 Tab by mouth three (3) times daily as needed for Dizziness. 60 Tab 11    amLODIPine (NORVASC) 5 mg tablet Take 1 Tab by mouth daily. 30 Tab 11    topiramate (TOPAMAX) 200 mg tablet Take  by mouth nightly.         mometasone (NASONEX) 50 mcg/actuation nasal spray USE 2 SPRAYS IN EACH NOSTRIL EVERY DAY 1 Container 11     Allergies   Allergen Reactions    Tamiflu [Oseltamivir Phosphate] Hives     Family History   Problem Relation Age of Onset    Stroke Mother      Social History     Tobacco Use    Smoking status: Never Smoker    Smokeless tobacco: Never Used   Substance Use Topics    Alcohol use: No     Patient Active Problem List   Diagnosis Code    Dyslipidemia E78.5    Headache R51    Migraines G43.909    HTN (hypertension) I10    Spinal stenosis M48.00    Prediabetes R73.03    Sinus congestion R09.81    Lower urinary tract infectious disease N39.0    Bereavement Z63.4    Rash R21    Dense breasts R92.2    Mild depression (HCC) F32.0       Depression Risk Factor Screening:     3 most recent PHQ Screens 9/9/2014   Little interest or pleasure in doing things Not at all   Feeling down, depressed, irritable, or hopeless Not at all   Total Score PHQ 2 0     Alcohol Risk Factor Screening: You do not drink alcohol or very rarely. Functional Ability and Level of Safety:   Hearing Loss  Hearing is good. Activities of Daily Living  The home contains: no safety equipment. Patient does total self care    Fall Risk  Fall Risk Assessment, last 12 mths 7/30/2019   Able to walk? Yes   Fall in past 12 months?  No       Abuse Screen  Patient is not abused    Cognitive Screening   Evaluation of Cognitive Function:  Has your family/caregiver stated any concerns about your memory: no  Normal    Patient Care Team   Patient Care Team:  Oral MD Frank as PCP - General (Internal Medicine)    Assessment/Plan   Education and counseling provided:  Are appropriate based on today's review and evaluation        Health Maintenance Due   Topic Date Due    Shingrix Vaccine Age 49> (1 of 2) 09/29/2001    Pneumococcal 65+ years (1 of 2 - PCV13) 09/29/2016    MEDICARE YEARLY EXAM  07/18/2019    GLAUCOMA SCREENING Q2Y  09/07/2019

## 2019-07-30 NOTE — ACP (ADVANCE CARE PLANNING)

## 2019-07-30 NOTE — PROGRESS NOTES
SPORTS MEDICINE AND PRIMARY CARE  Maxine Roman MD, [address] [address]  Phone:  [phone]  Fax: [phone]      Chief Complaint   Patient presents with    Annual Wellness Visit    Urinary Frequency         SUBECTIVE:    Oliver Solomon is a 80 y.o. female Patient returns today with known history of spinal stenosis, prediabetes, mild depression, primary hypertension, dyslipidemia, headaches, and is seen for evaluation. Patient returns today stating that since we last saw her she had an anaphylactoid type reaction to sunflower seed and [facility] recommended using EpiPen, for which she needs a refill on. Since we last saw her, her  in April had a stroke that was of such magnitude that he is bed to chair confined and she is having to take care of him in addition to having to work. Patient is under a great deal of stressors. Patient is seen for evaluation. Current Outpatient Medications   Medication Sig Dispense Refill    SUMAtriptan (IMITREX) 100 mg tablet Take 100 mg by mouth once as needed for Migraine.  EPINEPHrine (EPIPEN) 0.3 mg/0.3 mL injection 0.3 mL by IntraMUSCular route once as needed for Anaphylaxis for up to 1 dose. 1 Syringe 11    amLODIPine (NORVASC) 5 mg tablet Take 1 Tab by mouth daily. 30 Tab 11    traZODone (DESYREL) 100 mg tablet Take 1 Tab by mouth nightly. 180 Tab 1    SALINE NOSE 0.65 % nasal squeeze bottle USE 2 SPRAYS IN EACH NOSTRIL 4 TIMES DAILY 44 mL 11    atorvastatin (LIPITOR) 80 mg tablet Take 1 Tab by mouth daily. 90 Tab 3    meclizine (ANTIVERT) 25 mg tablet Take 1 Tab by mouth three (3) times daily as needed for Dizziness. 60 Tab 11    topiramate (TOPAMAX) 200 mg tablet Take  by mouth nightly.         mometasone (NASONEX) 50 mcg/actuation nasal spray USE 2 SPRAYS IN EACH NOSTRIL EVERY DAY 1 Container 11     Past Medical History:   Diagnosis Date    Bereavement 3/16    father - dementia - age 80    [facility]    Dense breasts     Depression     Dyslipidemia     Headache     High cholesterol     HTN (hypertension)     Hypertension     Migraines     Prediabetes     Rash     S/P colonoscopy 4-3-14    Sinus congestion     Spinal stenosis     UTI (lower urinary tract infection)      Past Surgical History:   Procedure Laterality Date    HX BREAST BIOPSY Right     neg; surgical bx    HX COLONOSCOPY      HX GYN      HX HYSTERECTOMY       Allergies   Allergen Reactions    Tamiflu [Oseltamivir Phosphate] Hives       REVIEW OF SYSTEMS:   No chest pain, no shortness of breath. Social History     Socioeconomic History    Marital status:      Spouse name: Not on file    Number of children: Not on file    Years of education: Not on file    Highest education level: Not on file   Tobacco Use    Smoking status: Never Smoker    Smokeless tobacco: Never Used   Substance and Sexual Activity    Alcohol use: No    Drug use: No    Sexual activity: Not Currently     Partners: Male     Birth control/protection: None   Social History Narrative         Family History: Mother:  76 yrs, Hypertension and diabetes mi,cvaFather: alive 80 yrs, htnDaughter(s): alive 32 yrsSon(s): alive 28    yrs1 son(s) , 1 daughter(s) . Social History: Alcohol Use Patient uses alcohol, Drinks per occasion: 2, Drinks per w [place]: 0. Smoking Status Patient is a never    smoker. Marital Status: . Lives alone.  left suddenly  returned ! 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  Education/School: has highschool diploma, has college    diploma [id number]  Family History   Problem Relation Age of Onset    Stroke Mother        OBJECTIVE:  Visit Vitals  /77   Pulse 71   Temp 98.6 °F (37 °C) (Oral)   Resp 18   Ht 5' 5\" (1.651 m)   Wt 153 lb 3.2 oz (69.5 kg)   SpO2 99%   BMI 25.49 kg/m²     ENT: perrla,  eom intact  NECK: supple. Thyroid normal  CHEST: clear to ascultation and percussion   HEART: regular rate and rhythm  ABD: soft, bowel sounds active  EXTREMITIES: no edema, pulse 1+     No visits with results within 3 Month(s) from this visit. Latest known visit with results is:   Office Visit on 01/29/2019   Component Date Value Ref Range Status    Glucose 01/29/2019 86  65 - 99 mg/dL Final    BUN 01/29/2019 11  8 - 27 mg/dL Final    Creatinine 01/29/2019 1.19* 0.57 - 1.00 mg/dL Final    GFR est non-AA 01/29/2019 47* >59 mL/min/1.73 Final    GFR est AA 01/29/2019 55* >59 mL/min/1.73 Final    BUN/Creatinine ratio 01/29/2019 9* 12 - 28 Final    Sodium 01/29/2019 144  134 - 144 mmol/L Final    Potassium 01/29/2019 4.0  3.5 - 5.2 mmol/L Final    Chloride 01/29/2019 108* 96 - 106 mmol/L Final    CO2 01/29/2019 23  20 - 29 mmol/L Final    Calcium 01/29/2019 10.0  8.7 - 10.3 mg/dL Final    Phosphorus 01/29/2019 3.2  2.5 - 4.5 mg/dL Final    Albumin 01/29/2019 4.6  3.6 - 4.8 g/dL Final    Hemoglobin A1c 01/29/2019 5.8* 4.8 - 5.6 % Final    Comment:          Prediabetes: 5.7 - 6.4           Diabetes: >6.4           Glycemic control for adults with diabetes: <7.0      Estimated average glucose 01/29/2019 120  mg/dL Final          ASSESSMENT:  1. Medicare annual wellness visit, subsequent    2. Screening for alcoholism    3. Screening for depression    4. Spinal stenosis of lumbosacral region    5. Prediabetes    6. Mild depression ([address]    7. Essential hypertension    8. Dyslipidemia    9. Abnormal finding of blood chemistry     10. Encounter for screening mammogram for malignant neoplasm of breast       Patient's medical status is stable. Repeat BP is more reasonable at 130/82. I suspect it drops even lower at home. We agree with renewing the EpiPen and sent a prescription over to the pharmacist.  She also needs a new prescription for Amlodipine.  Advised if the prescription runs out the pharmacy will send us a request and we will send refills. BMI remains at ideal body weight. Appropriate lab studies requested, will send them to her in the mail. She will return to see us in six months, sooner if she has any problems. I have discussed the diagnosis with the patient and the intended plan as seen in the  orders above. The patient understands and agees with the plan. The patient has   received an after visit summary and questions were answered concerning  future plans  Patient labs and/or xrays were reviewed  Past records were reviewed. PLAN:  .  Orders Placed This Encounter    Depression Screen Annual    HECTOR MAMMO BI SCREENING INCL CAD    URINALYSIS W/ RFLX MICROSCOPIC    CBC WITH AUTOMATED DIFF    METABOLIC PANEL, COMPREHENSIVE    LIPID PANEL    TSH 3RD GENERATION    HEMOGLOBIN A1C WITH EAG    SUMAtriptan (IMITREX) 100 mg tablet    DISCONTD: amLODIPine (NORVASC) 5 mg tablet    EPINEPHrine (EPIPEN) 0.3 mg/0.3 mL injection    amLODIPine (NORVASC) 5 mg tablet       Follow-up and Dispositions    · Return in about 6 months (around 1/30/2020). ATTENTION:   This medical record was transcribed using an electronic medical records system. Although proofread, it may and can contain electronic and spelling errors. Other human spelling and other errors may be present. Corrections may be executed at a later time. Please feel free to contact us for any clarifications as needed.

## 2019-07-30 NOTE — PATIENT INSTRUCTIONS
Medicare Wellness Visit, Female     The best way to live healthy is to have a lifestyle where you eat a well-balanced diet, exercise regularly, limit alcohol use, and quit all forms of tobacco/nicotine, if applicable. Regular preventive services are another way to keep healthy. Preventive services (vaccines, screening tests, monitoring & exams) can help personalize your care plan, which helps you manage your own care. Screening tests can find health problems at the earliest stages, when they are easiest to treat. [facility] follows the current, evidence-based guidelines published by the [facility] ([facility]) when recommending preventive services for our patients. Because we follow these guidelines, sometimes recommendations change over time as research supports it. (For example, mammograms used to be recommended annually. Even though Medicare will still pay for an annual mammogram, the newer guidelines recommend a mammogram every two years for women of average risk.)  Of course, you and your doctor may decide to screen more often for some diseases, based on your risk and your health status. Preventive services for you include:  - Medicare offers their members a free annual wellness visit, which is time for you and your primary care provider to discuss and plan for your preventive service needs. Take advantage of this benefit every year!  -All adults over the age of 72 should receive the recommended pneumonia vaccines. Current USPSTF guidelines recommend a series of two vaccines for the best pneumonia protection.   -All adults should have a flu vaccine yearly and a tetanus vaccine every 10 years. All adults age 61 and older should receive a shingles vaccine once in their lifetime.    -A bone mass density test is recommended when a woman turns 65 to screen for osteoporosis. This test is only recommended one time, as a screening.  Some providers will use this same test as a disease monitoring tool if you already have osteoporosis. -All adults age 38-68 who are overweight should have a diabetes screening test once every three years.   -Other screening tests and preventive services for persons with diabetes include: an eye exam to screen for diabetic retinopathy, a kidney function test, a foot exam, and stricter control over your cholesterol.   -Cardiovascular screening for adults with routine risk involves an electrocardiogram (ECG) at intervals determined by your doctor.   -Colorectal cancer screenings should be done for adults age 54-65 with no increased risk factors for colorectal cancer. There are a number of acceptable methods of screening for this type of cancer. Each test has its own benefits and drawbacks. Discuss with your doctor what is most appropriate for you during your annual wellness visit. The different tests include: colonoscopy (considered the best screening method), a fecal occult blood test, a fecal DNA test, and sigmoidoscopy. -Breast cancer screenings are recommended every other year for women of normal risk, age 54-69.  -Cervical cancer screenings for women over age 72 are only recommended with certain risk factors.   -All adults born between [facility] should be screened once for Hepatitis C.      Here is a list of your current Health Maintenance items (your personalized list of preventive services) with a due date:  Health Maintenance Due   Topic Date Due    Shingles Vaccine (1 of 2) 09/29/2001    Pneumococcal Vaccine (1 of 2 - PCV13) 09/29/2016    Annual Well Visit  07/18/2019    Glaucoma Screening   09/07/2019

## 2019-08-23 ENCOUNTER — HOSPITAL ENCOUNTER (OUTPATIENT)
Dept: MAMMOGRAPHY | Age: 69
Discharge: HOME | End: 2019-08-23
Attending: INTERNAL MEDICINE
Payer: MEDICARE

## 2019-08-23 DIAGNOSIS — Z12.39 BREAST SCREENING: ICD-10-CM

## 2019-08-23 PROCEDURE — 77067 SCR MAMMO BI INCL CAD: CPT

## 2019-11-17 RX ORDER — ATORVASTATIN CALCIUM 80 MG/1
TABLET, FILM COATED ORAL
Qty: 90 TAB | Refills: 3 | Status: SHIPPED | OUTPATIENT
Start: 2019-11-17 | End: 2020-11-10

## 2019-12-03 ENCOUNTER — OFFICE VISIT (OUTPATIENT)
Dept: INTERNAL MEDICINE CLINIC | Age: 69
End: 2019-12-03

## 2019-12-03 VITALS
TEMPERATURE: 98.8 F | HEIGHT: 65 IN | DIASTOLIC BLOOD PRESSURE: 82 MMHG | HEART RATE: 79 BPM | RESPIRATION RATE: 16 BRPM | SYSTOLIC BLOOD PRESSURE: 138 MMHG | WEIGHT: 152.6 LBS | OXYGEN SATURATION: 100 % | BODY MASS INDEX: 25.43 KG/M2

## 2019-12-03 DIAGNOSIS — R73.03 PREDIABETES: ICD-10-CM

## 2019-12-03 DIAGNOSIS — N18.30 CKD (CHRONIC KIDNEY DISEASE), STAGE III (HCC): ICD-10-CM

## 2019-12-03 DIAGNOSIS — I10 ESSENTIAL HYPERTENSION: Primary | ICD-10-CM

## 2019-12-03 DIAGNOSIS — E78.5 DYSLIPIDEMIA: ICD-10-CM

## 2019-12-03 DIAGNOSIS — G43.909 MIGRAINE WITHOUT STATUS MIGRAINOSUS, NOT INTRACTABLE, UNSPECIFIED MIGRAINE TYPE: ICD-10-CM

## 2019-12-03 DIAGNOSIS — R10.13 EPIGASTRIC PAIN: ICD-10-CM

## 2019-12-03 RX ORDER — PANTOPRAZOLE SODIUM 40 MG/1
40 TABLET, DELAYED RELEASE ORAL DAILY
Qty: 30 TAB | Refills: 2 | Status: SHIPPED | OUTPATIENT
Start: 2019-12-03 | End: 2020-12-02

## 2019-12-03 NOTE — PROGRESS NOTES
SPORTS MEDICINE AND PRIMARY CARE  Quentin Ross MD, [address] [address]  Phone:  [phone]  Fax: [phone]       Chief Complaint   Patient presents with    Abdominal Pain   . SUBJECTIVE:    Fady Latham is a 77 y.o. female Patient returns today and has been seen by Allan Mojica with [facility] for her migraines, for which he has her on Emgality. She has in addition a history of primary hypertension, prediabetes, dyslipidemia, CKD stage 3, and is seen for evaluation. Patient comes in complaining of epigastric abdominal discomfort, described as a sharp pain initially for a couple hours, then afterwards becomes a dull ache. He has had it for the past week and a half. Food does not seem to relieve it. She has irregular bowel movements and has been like that for years, so there has been no change in her bowel movements. No nausea or vomiting. Patient also thinks she may have a sinus headache compared to her usual migraines and wonders if she has a sinus infection. Patient is seen for evaluation. Current Outpatient Medications   Medication Sig Dispense Refill    galcanezumab-gnlm (EMGALITY SYRINGE) 120 mg/mL syrg by SubCUTAneous route.  pantoprazole (PROTONIX) 40 mg tablet Take 1 Tab by mouth daily. 30 Tab 2    atorvastatin (LIPITOR) 80 mg tablet TAKE 1 TABLET BY MOUTH EVERY DAY 90 Tab 3    SUMAtriptan (IMITREX) 100 mg tablet Take 100 mg by mouth once as needed for Migraine.  amLODIPine (NORVASC) 5 mg tablet Take 1 Tab by mouth daily. 30 Tab 11    traZODone (DESYREL) 100 mg tablet Take 1 Tab by mouth nightly. 180 Tab 1    SALINE NOSE 0.65 % nasal squeeze bottle USE 2 SPRAYS IN EACH NOSTRIL 4 TIMES DAILY 44 mL 11    meclizine (ANTIVERT) 25 mg tablet Take 1 Tab by mouth three (3) times daily as needed for Dizziness. 60 Tab 11    topiramate (TOPAMAX) 200 mg tablet Take  by mouth nightly.         mometasone (NASONEX) 50 mcg/actuation nasal spray USE 2 SPRAYS IN EACH NOSTRIL EVERY DAY 1 Container 11     Past Medical History:   Diagnosis Date    Abdominal pain     Bereavement 3/16    father - dementia - age 80    [facility]    Dense breasts     Depression     Dyslipidemia     Headache     High cholesterol     HTN (hypertension)     Hypertension     Migraines     Prediabetes     Rash     S/P colonoscopy 4-3-14    Sinus congestion     Spinal stenosis     UTI (lower urinary tract infection)      Past Surgical History:   Procedure Laterality Date    HX BREAST BIOPSY Right 2000    neg; surgical bx    HX COLONOSCOPY      HX GYN      HX HYSTERECTOMY       Allergies   Allergen Reactions    Tamiflu [Oseltamivir Phosphate] Hives         REVIEW OF SYSTEMS:  General: negative for - chills or fever  ENT: negative for - headaches, nasal congestion or tinnitus  Respiratory: negative for - cough, hemoptysis, shortness of breath or wheezing  Cardiovascular : negative for - chest pain, edema, palpitations or shortness of breath  Gastrointestinal: negative for - abdominal pain, blood in stools, heartburn or nausea/vomiting  Genito-Urinary: no dysuria, trouble voiding, or hematuria  Musculoskeletal: negative for - gait disturbance, joint pain, joint stiffness or joint swelling  Neurological: no TIA or stroke symptoms  Hematologic: no bruises, no bleeding, no swollen glands  Integument: no lumps, mole changes, nail changes or rash  Endocrine: no malaise/lethargy or unexpected weight changes      Social History     Socioeconomic History    Marital status:      Spouse name: Not on file    Number of children: Not on file    Years of education: Not on file    Highest education level: Not on file   Tobacco Use    Smoking status: Never Smoker    Smokeless tobacco: Never Used   Substance and Sexual Activity    Alcohol use: No    Drug use: No    Sexual activity: Not Currently Partners: Male     Birth control/protection: None   Social History Narrative         Family History: Mother:  76 yrs, Hypertension and diabetes mi,cvaFather: alive 80 yrs, htnDaughter(s): alive 32 yrsSon(s): alive 28    yrs1 son(s) , 1 daughter(s) . Social History: Alcohol Use Patient uses alcohol, Drinks per occasion: 2, Drinks per w [place]: 0. Smoking Status Patient is a never    smoker. Marital Status: . Lives alone.  left suddenly  returned ! 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 Education/School: has highschool diploma, has college    diploma [employer]     Family History   Problem Relation Age of Onset    Stroke Mother        OBJECTIVE:    Visit Vitals  /82   Pulse 79   Temp 98.8 °F (37.1 °C) (Oral)   Resp 16   Ht 5' 5\" (1.651 m)   Wt 152 lb 9.6 oz (69.2 kg)   SpO2 100%   BMI 25.39 kg/m²     CONSTITUTIONAL: well , well nourished, appears age appropriate  EYES: perrla, eom intact  ENMT:moist mucous membranes, pharynx clear  NECK: supple. Thyroid normal  RESPIRATORY: Chest: clear bilaterally   CARDIOVASCULAR: Heart: regular rate and rhythm  GASTROINTESTINAL: Abdomen: soft, bowel sounds active  HEMATOLOGIC: no pathological lymph nodes palpated  MUSCULOSKELETAL: Extremities: no edema, pulse 1+   INTEGUMENT: No unusual rashes or suspicious skin lesions noted. Nails appear normal.  NEUROLOGIC: non-focal exam   MENTAL STATUS: alert and oriented, appropriate affect           ASSESSMENT:  1. Essential hypertension    2. Migraine without status migrainosus, not intractable, unspecified migraine type    3. Prediabetes    4. Dyslipidemia    5. CKD (chronic kidney disease), stage III (HCC)    6. Epigastric pain    7. Prediabetes       Blood pressure control is at goal on Amlodipine. No adjustments will be made. Her migraines are controlled with a new medication from Dr. Juanita Castano. She continues with the Imitrex as needed.     History of prediabetes, which is being evaluated today. For her dyslipidemia we have her on a high dose of Atorvastatin. If this remains elevated we may consider the addition of Zetia. Chronic kidney disease will be evaluated metabolically. She has abdominal pain primarily in the epigastric area, certainly raises the question of a gastritis. Will place her on Protonix 40 mg twice a day for the next five days, then 40 mg daily thereafter. Will ask for CT of the abdomen and pelvis. In two weeks when she comes back if she is still having the pain, not relieved with the Protonix, then she will need to have an EGD performed by a gastroenterologist.  She agrees with the plan. She will be back to see us in two weeks, but will cancel the appointment if no longer having the discomfort. I have discussed the diagnosis with the patient and the intended plan as seen in the  orders above. The patient understands and agees with the plan. The patient has   received an after visit summary and questions were answered concerning  future plans  Patient labs and/or xrays were reviewed  Past records were reviewed. PLAN:  .  Orders Placed This Encounter    CT ABD PELV W CONT    URINALYSIS W/ RFLX MICROSCOPIC    CBC WITH AUTOMATED DIFF    METABOLIC PANEL, COMPREHENSIVE    LIPID PANEL    TSH 3RD GENERATION    HEMOGLOBIN A1C WITH EAG    galcanezumab-gnlm (EMGALITY SYRINGE) 120 mg/mL syrg    pantoprazole (PROTONIX) 40 mg tablet       Follow-up and Dispositions    · Return in about 2 weeks (around 12/17/2019). ATTENTION:   This medical record was transcribed using an electronic medical records system. Although proofread, it may and can contain electronic and spelling errors. Other human spelling and other errors may be present. Corrections may be executed at a later time. Please feel free to contact us for any clarifications as needed.

## 2019-12-03 NOTE — PROGRESS NOTES
Chief Complaint   Patient presents with    Abdominal Pain     1. Have you been to the ER, urgent care clinic since your last visit? Hospitalized since your last visit? Yes When: September 2019 Reason for visit: Sinus infection     2. Have you seen or consulted any other health care providers outside of the [address] [place] [address] since your last visit? Include any pap smears or colon screening.  Yes Where: [facility]

## 2019-12-04 LAB
ALBUMIN SERPL-MCNC: 4.6 G/DL (ref 3.6–4.8)
ALBUMIN/GLOB SERPL: 1.6 {RATIO} (ref 1.2–2.2)
ALP SERPL-CCNC: 54 IU/L (ref 39–117)
ALT SERPL-CCNC: 14 IU/L (ref 0–32)
APPEARANCE UR: CLEAR
AST SERPL-CCNC: 19 IU/L (ref 0–40)
BASOPHILS # BLD AUTO: 0.1 X10E3/UL (ref 0–0.2)
BASOPHILS NFR BLD AUTO: 2 %
BILIRUB SERPL-MCNC: 0.3 MG/DL (ref 0–1.2)
BILIRUB UR QL STRIP: NEGATIVE
BUN SERPL-MCNC: 12 MG/DL (ref 8–27)
BUN/CREAT SERPL: 11 (ref 12–28)
CALCIUM SERPL-MCNC: 9.8 MG/DL (ref 8.7–10.3)
CHLORIDE SERPL-SCNC: 109 MMOL/L (ref 96–106)
CHOLEST SERPL-MCNC: 176 MG/DL (ref 100–199)
CO2 SERPL-SCNC: 19 MMOL/L (ref 20–29)
COLOR UR: YELLOW
CREAT SERPL-MCNC: 1.07 MG/DL (ref 0.57–1)
EOSINOPHIL # BLD AUTO: 0.1 X10E3/UL (ref 0–0.4)
EOSINOPHIL NFR BLD AUTO: 3 %
ERYTHROCYTE [DISTWIDTH] IN BLOOD BY AUTOMATED COUNT: 12.8 % (ref 12.3–15.4)
EST. AVERAGE GLUCOSE BLD GHB EST-MCNC: 120 MG/DL
GLOBULIN SER CALC-MCNC: 2.9 G/DL (ref 1.5–4.5)
GLUCOSE SERPL-MCNC: 96 MG/DL (ref 65–99)
GLUCOSE UR QL: NEGATIVE
HBA1C MFR BLD: 5.8 % (ref 4.8–5.6)
HCT VFR BLD AUTO: 36.8 % (ref 34–46.6)
HDLC SERPL-MCNC: 67 MG/DL
HGB BLD-MCNC: 12.5 G/DL (ref 11.1–15.9)
HGB UR QL STRIP: NEGATIVE
IMM GRANULOCYTES # BLD AUTO: 0 X10E3/UL (ref 0–0.1)
IMM GRANULOCYTES NFR BLD AUTO: 0 %
KETONES UR QL STRIP: NEGATIVE
LDLC SERPL CALC-MCNC: 96 MG/DL (ref 0–99)
LEUKOCYTE ESTERASE UR QL STRIP: NEGATIVE
LYMPHOCYTES # BLD AUTO: 1.8 X10E3/UL (ref 0.7–3.1)
LYMPHOCYTES NFR BLD AUTO: 39 %
MCH RBC QN AUTO: 29.3 PG (ref 26.6–33)
MCHC RBC AUTO-ENTMCNC: 34 G/DL (ref 31.5–35.7)
MCV RBC AUTO: 86 FL (ref 79–97)
MICRO URNS: NORMAL
MONOCYTES # BLD AUTO: 0.4 X10E3/UL (ref 0.1–0.9)
MONOCYTES NFR BLD AUTO: 9 %
NEUTROPHILS # BLD AUTO: 2.3 X10E3/UL (ref 1.4–7)
NEUTROPHILS NFR BLD AUTO: 47 %
NITRITE UR QL STRIP: NEGATIVE
PH UR STRIP: 5.5 [PH] (ref 5–7.5)
PLATELET # BLD AUTO: 346 X10E3/UL (ref 150–450)
POTASSIUM SERPL-SCNC: 4.1 MMOL/L (ref 3.5–5.2)
PROT SERPL-MCNC: 7.5 G/DL (ref 6–8.5)
PROT UR QL STRIP: NEGATIVE
RBC # BLD AUTO: 4.27 X10E6/UL (ref 3.77–5.28)
SODIUM SERPL-SCNC: 142 MMOL/L (ref 134–144)
SP GR UR: 1.01 (ref 1–1.03)
TRIGL SERPL-MCNC: 66 MG/DL (ref 0–149)
TSH SERPL DL<=0.005 MIU/L-ACNC: 1.3 UIU/ML (ref 0.45–4.5)
UROBILINOGEN UR STRIP-MCNC: 0.2 MG/DL (ref 0.2–1)
VLDLC SERPL CALC-MCNC: 13 MG/DL (ref 5–40)
WBC # BLD AUTO: 4.7 X10E3/UL (ref 3.4–10.8)

## 2020-05-07 ENCOUNTER — VIRTUAL VISIT (OUTPATIENT)
Dept: INTERNAL MEDICINE CLINIC | Age: 70
End: 2020-05-07

## 2020-05-07 DIAGNOSIS — F32.A MILD DEPRESSION: ICD-10-CM

## 2020-05-07 DIAGNOSIS — N18.30 CKD (CHRONIC KIDNEY DISEASE), STAGE III (HCC): Primary | ICD-10-CM

## 2020-05-07 DIAGNOSIS — R73.03 PREDIABETES: ICD-10-CM

## 2020-05-07 DIAGNOSIS — G43.909 MIGRAINE WITHOUT STATUS MIGRAINOSUS, NOT INTRACTABLE, UNSPECIFIED MIGRAINE TYPE: ICD-10-CM

## 2020-05-07 DIAGNOSIS — M48.07 SPINAL STENOSIS OF LUMBOSACRAL REGION: ICD-10-CM

## 2020-05-07 DIAGNOSIS — I10 ESSENTIAL HYPERTENSION: ICD-10-CM

## 2020-05-07 DIAGNOSIS — E78.5 DYSLIPIDEMIA: ICD-10-CM

## 2020-05-07 RX ORDER — LOSARTAN POTASSIUM 100 MG/1
100 TABLET ORAL DAILY
Qty: 30 TAB | Refills: 5 | Status: SHIPPED | OUTPATIENT
Start: 2020-05-07 | End: 2020-10-31

## 2020-05-07 NOTE — PROGRESS NOTES
1. Have you been to the ER, urgent care clinic since your last visit? Hospitalized since your last visit? No    2. Have you seen or consulted any other health care providers outside of the [address] [place] [address] since your last visit? Include any pap smears or colon screening.  No     Elevated BP

## 2020-05-07 NOTE — PROGRESS NOTES
Jerry Perez is a 77 y.o. female who was seen by synchronous (real-time) audio-video technology on 5/7/2020. Consent: Jerry Perez, who was seen by synchronous (real-time) audio-video technology, and/or her healthcare decision maker, is aware that this patient-initiated, Telehealth encounter on 5/7/2020 is a billable service, with coverage as determined by her insurance carrier. She is aware that she may receive a bill and has provided verbal consent to proceed: Yes. Assessment & Plan:   Diagnoses and all orders for this visit:    1. CKD (chronic kidney disease), stage III ([address] her last metabolic evaluation indicated normal renal function. 2. Mild depression ([facility] [address] she continues to have situational factors particularly in the home related to her . We recall that he left her for. Time and then when he had a stroke he came back. 3. Spinal stenosis of lumbosacral region currently tolerable and I suspect the Medrol Dosepak will help any inflammation. 4. Essential hypertension patient is currently on amlodipine for blood pressure control and we will add an ARB and titrated to induce a normotensive response. 5. Dyslipidemia on December 3, 2019 total cholesterol was 176, HDL 67 and LDL 96. She is currently on atorvastatin 80 mg which is acceptable. 6. Migraine without status migrainosus, not intractable, unspecified migraine type for migraine she is on Imitrex and through the [place] she may be starting a new drug Emgality. 7. Prediabetes laboratory studies continue to confirm prediabetes with hemoglobin A1c of 5.8 on December 3, 2019. We encourage her to follow coronavirus precautions contact us she needs any help particularly monitoring blood pressure.               Subjective:   Jerry Perez is a 77 y.o. female who was seen for Hypertension  Patient is seen with known history of prediabetes migraines, dyslipidemia, primary hypertension, spinal stenosis, chronic kidney disease, and mild depression. She currently complains of epistaxis and is concerned about blood pressure elevations. On Sunday the blood pressure was 164/104 today is 149/80. She continues to have nosebleeds in spite of the use of saline nasal spray. Since we last saw her [facility] where she was given Medrol Dosepak for sinus infection and continued on saline nasal spray. She notes some dizziness with a headache. She still stressed at home as her  has had a stroke and is wheelchair-bound but she states he is getting a little more active. Prior to Admission medications    Medication Sig Start Date End Date Taking? Authorizing Provider   Graham Chang ([employer]) [employer]ig Autoinjector   Yes Provider, Historical   losartan (COZAAR) 100 mg tablet Take 1 Tab by mouth daily. 5/7/20  Yes Robbi Costello MD   galcanezumab-gnlm (EMGALITY SYRINGE) 120 mg/mL syrg by SubCUTAneous route. Yes Provider, Historical   atorvastatin (LIPITOR) 80 mg tablet TAKE 1 TABLET BY MOUTH EVERY DAY 11/17/19  Yes Robbi Costello MD   SUMAtriptan (IMITREX) 100 mg tablet Take 100 mg by mouth once as needed for Migraine. Yes Provider, Historical   amLODIPine (NORVASC) 5 mg tablet Take 1 Tab by mouth daily. 7/30/19  Yes Robbi Costello MD   traZODone (DESYREL) 100 mg tablet Take 1 Tab by mouth nightly. 6/12/19  Yes Robbi Costello MD   SALINE NOSE 0.65 % nasal squeeze bottle USE 2 SPRAYS IN EACH NOSTRIL 4 TIMES DAILY 4/23/19  Yes Robbi Costello MD   meclizine (ANTIVERT) 25 mg tablet Take 1 Tab by mouth three (3) times daily as needed for Dizziness. 7/17/18  Yes Robbi Costello MD   mometasone (NASONEX) 50 mcg/actuation nasal spray USE 2 SPRAYS IN EACH NOSTRIL EVERY DAY 3/6/18  Yes Robbi Costello MD   topiramate (TOPAMAX) 200 mg tablet Take  by mouth nightly. Yes Other, MD Eugene   pantoprazole (PROTONIX) 40 mg tablet Take 1 Tab by mouth daily. 12/3/19   Monica Wolfe MD     Allergies   Allergen Reactions    Tamiflu [Oseltamivir Phosphate] Hives       REVIEW OF SYSTEMS as noted below except that noted in subjective:  General: negative for - chills or fever  ENT: negative for - headaches, nasal congestion or tinnitus  Respiratory: negative for - cough, hemoptysis, shortness of breath or wheezing  Cardiovascular : negative for - chest pain, edema, palpitations or shortness of breath  Gastrointestinal: negative for - abdominal pain, blood in stools, heartburn or nausea/vomiting  Genito-Urinary: no dysuria, trouble voiding, or hematuria  Musculoskeletal: negative for - gait disturbance, joint pain, joint stiffness or joint swelling  Neurological: no TIA or stroke symptoms  Hematologic: no bruises, no bleeding, no swollen glands  Integument: no lumps, mole changes, nail changes or rash  Endocrine:no malaise/lethargy or unexpected weight changes      Patient Active Problem List   Diagnosis Code    Dyslipidemia E78.5    Headache R51    Migraines G43.909    HTN (hypertension) I10    Spinal stenosis M48.00    Prediabetes R73.03    Sinus congestion R09.81    Lower urinary tract infectious disease N39.0    Bereavement Z63.4    Rash R21    Dense breasts R92.2    Mild depression (HCC) F32.0    CKD (chronic kidney disease), stage III (HCC) N18.3    Abdominal pain R10.9     Patient Active Problem List    Diagnosis Date Noted    Abdominal pain     Mild depression ([facility] [address] 07/17/2018    CKD (chronic kidney disease), stage III (HCC) 03/16/2017    Dense breasts     Bereavement     Rash     Migraines     HTN (hypertension)     Spinal stenosis     Prediabetes     Sinus congestion     Lower urinary tract infectious disease     Dyslipidemia     Headache      Current Outpatient Medications   Medication Sig Dispense Refill    erenumab-aooe ([employer]IG AUTOINJECTOR SC) Aimovig Autoinjector      losartan (COZAAR) 100 mg tablet Take 1 Tab by mouth daily. 30 Tab 5    galcanezumab-gnlm (EMGALITY SYRINGE) 120 mg/mL syrg by SubCUTAneous route.  atorvastatin (LIPITOR) 80 mg tablet TAKE 1 TABLET BY MOUTH EVERY DAY 90 Tab 3    SUMAtriptan (IMITREX) 100 mg tablet Take 100 mg by mouth once as needed for Migraine.  amLODIPine (NORVASC) 5 mg tablet Take 1 Tab by mouth daily. 30 Tab 11    traZODone (DESYREL) 100 mg tablet Take 1 Tab by mouth nightly. 180 Tab 1    SALINE NOSE 0.65 % nasal squeeze bottle USE 2 SPRAYS IN EACH NOSTRIL 4 TIMES DAILY 44 mL 11    meclizine (ANTIVERT) 25 mg tablet Take 1 Tab by mouth three (3) times daily as needed for Dizziness. 60 Tab 11    mometasone (NASONEX) 50 mcg/actuation nasal spray USE 2 SPRAYS IN EACH NOSTRIL EVERY DAY 1 Container 11    topiramate (TOPAMAX) 200 mg tablet Take  by mouth nightly.  pantoprazole (PROTONIX) 40 mg tablet Take 1 Tab by mouth daily.  30 Tab 2     Allergies   Allergen Reactions    Tamiflu [Oseltamivir Phosphate] Hives     Past Medical History:   Diagnosis Date    Abdominal pain     Bereavement 3/16    father - dementia - age 80    CKD (chronic kidney disease), stage III ([facility] [address] 03/16/2017    Dense breasts     Depression     Dyslipidemia     Headache     High cholesterol     HTN (hypertension)     Hypertension     Migraines     Prediabetes     Rash     S/P colonoscopy 04/18/2019    renee guzman md - tubular adenoma    Sinus congestion     Spinal stenosis     UTI (lower urinary tract infection)      Past Surgical History:   Procedure Laterality Date    HX BREAST BIOPSY Right 2000    neg; surgical bx    HX COLONOSCOPY      HX GYN      HX HYSTERECTOMY       Family History   Problem Relation Age of Onset    Stroke Mother      Social History     Tobacco Use    Smoking status: Never Smoker    Smokeless tobacco: Never Used   Substance Use Topics    Alcohol use: No       ROS    Objective:   Vital Signs: (As obtained by patient/caregiver at home)  There were no vitals taken for this visit. [INSTRUCTIONS:  \"[x]\" Indicates a positive item  \"[]\" Indicates a negative item  -- DELETE ALL ITEMS NOT EXAMINED]    Constitutional: [x] Appears well-developed and well-nourished [x] No apparent distress      [] Abnormal -     Mental status: [x] Alert and awake  [x] Oriented to person/place/time [x] Able to follow commands    [] Abnormal -     Eyes:   EOM    [x]  Normal    [] Abnormal -   Sclera  [x]  Normal    [] Abnormal -          Discharge [x]  None visible   [] Abnormal -     HENT: [x] Normocephalic, atraumatic  [] Abnormal -   [x] Mouth/Throat: Mucous membranes are moist    External Ears [x] Normal  [] Abnormal -    Neck: [x] No visualized mass [] Abnormal -     Pulmonary/Chest: [x] Respiratory effort normal   [x] No visualized signs of difficulty breathing or respiratory distress        [] Abnormal -      Musculoskeletal:   [x] Normal gait with no signs of ataxia         [x] Normal range of motion of neck        [] Abnormal -     Neurological:        [x] No Facial Asymmetry (Cranial nerve 7 motor function) (limited exam due to video visit)          [x] No gaze palsy        [] Abnormal -          Skin:        [x] No significant exanthematous lesions or discoloration noted on facial skin         [] Abnormal -            Psychiatric:       [x] Normal Affect [] Abnormal -        [x] No Hallucinations    Other pertinent observable physical exam findings:-        We discussed the expected course, resolution and complications of the diagnosis(es) in detail. Medication risks, benefits, costs, interactions, and alternatives were discussed as indicated. I advised her to contact the office if her condition worsens, changes or fails to improve as anticipated. She expressed understanding with the diagnosis(es) and plan. Lydia Hall is a 77 y.o. female who was evaluated by a video visit encounter for concerns as above. Patient identification was verified prior to start of the visit.  A caregiver was present when appropriate. Due to this being a TeleHealth encounter ([facility] evaluation of the following organ systems was limited: Vitals/Constitutional/EENT/Resp/CV/GI//MS/Neuro/Skin/Heme-Lymph-Imm. Pursuant to the emergency declaration under the [address] [place] [address] waiver authority and the [employer] Act, this Virtual  Visit was conducted, with patient's (and/or legal guardian's) consent, to reduce the patient's risk of exposure to COVID-19 and provide necessary medical care. Services were provided through a video synchronous discussion virtually to substitute for in-person clinic visit. Patient and provider were located at their individual homes. Daryle Forester, MD    Please note that portions of this dictation may have been recorded with voice recognition software. Some unanticipated grammatical, syntax, homophones, and other interpretive errors are inadvertently transcribed by the computer software. An attempt at proof reading has been made to minimize errors and omissions. Please disregard these errors. Thank you.

## 2020-06-02 ENCOUNTER — VIRTUAL VISIT (OUTPATIENT)
Dept: INTERNAL MEDICINE CLINIC | Age: 70
End: 2020-06-02

## 2020-06-02 DIAGNOSIS — N18.30 CKD (CHRONIC KIDNEY DISEASE), STAGE III (HCC): ICD-10-CM

## 2020-06-02 DIAGNOSIS — I10 ESSENTIAL HYPERTENSION: Primary | ICD-10-CM

## 2020-06-02 DIAGNOSIS — M48.07 SPINAL STENOSIS OF LUMBOSACRAL REGION: ICD-10-CM

## 2020-06-02 DIAGNOSIS — E78.5 DYSLIPIDEMIA: ICD-10-CM

## 2020-06-02 DIAGNOSIS — G43.909 MIGRAINE WITHOUT STATUS MIGRAINOSUS, NOT INTRACTABLE, UNSPECIFIED MIGRAINE TYPE: ICD-10-CM

## 2020-06-02 NOTE — PROGRESS NOTES
1. Have you been to the ER, urgent care clinic since your last visit? Hospitalized since your last visit? No    2. Have you seen or consulted any other health care providers outside of the [address] [place] [address] since your last visit? Include any pap smears or colon screening.  No

## 2020-06-02 NOTE — PROGRESS NOTES
Moshe Agustin is a 77 y.o. female who was seen by synchronous (real-time) audio-video technology on 6/2/2020. Consent: Moshe Agustin, who was seen by synchronous (real-time) audio-video technology, and/or her healthcare decision maker, is aware that this patient-initiated, Telehealth encounter on 6/2/2020 is a billable service, with coverage as determined by her insurance carrier. She is aware that she may receive a bill and has provided verbal consent to proceed: Yes. Assessment & Plan:   Diagnoses and all orders for this visit:    1. Essential hypertension blood pressure control is lacking. Blood pressure today was 146/80 which is about where it usually runs she states. However she has not taken the amlodipine. When she started losartan she thought she was supposed to stop the amlodipine. We asked her to restart the amlodipine at bedtime continue losartan and let us know how her blood pressures were in about a month. 2. Dyslipidemia she continues to take the atorvastatin on a regular basis and when we see her again we will check her lipid panel. 3. CKD (chronic kidney disease), stage III ([facility] [address] presumably her kidney studies have been stable we do not have her on any nephrotoxic agents. 4. Migraine without status migrainosus, not intractable, unspecified migraine type she takes migraine prophylactic medication Topamax at bedtime from the neurologist.    5. Spinal stenosis of lumbosacral region fortunately the back pain is not an issue right now. Subjective:   Moshe Agustin is a 77 y.o. female who was seen for Hypertension  Patient seen today with known history of primary hypertension, dyslipidemia, chronic kidney disease, migraines, and spinal stenosis of the lumbar spine. She  concerned about her blood pressure. She gives me the readings for adjustment. Patient seen for evaluation without other new complaints.     Prior to Admission medications    Medication Sig Start Date End Date Taking? Authorizing Provider   Gianluca Vivas ([employer]OR [employer]) Aimovig Autoinjector   Yes Provider, Historical   losartan (COZAAR) 100 mg tablet Take 1 Tab by mouth daily. 5/7/20  Yes Radha Vidal MD   galcanezumab-gnlm (EMGALITY SYRINGE) 120 mg/mL syrg by SubCUTAneous route. Yes Provider, Historical   atorvastatin (LIPITOR) 80 mg tablet TAKE 1 TABLET BY MOUTH EVERY DAY 11/17/19  Yes Radha Vidal MD   SUMAtriptan (IMITREX) 100 mg tablet Take 100 mg by mouth once as needed for Migraine. Yes Provider, Historical   amLODIPine (NORVASC) 5 mg tablet Take 1 Tab by mouth daily. 7/30/19  Yes Radha Vidal MD   traZODone (DESYREL) 100 mg tablet Take 1 Tab by mouth nightly. 6/12/19  Yes Radha Vidal MD   SALINE NOSE 0.65 % nasal squeeze bottle USE 2 SPRAYS IN EACH NOSTRIL 4 TIMES DAILY 4/23/19  Yes Radha Vidal MD   meclizine (ANTIVERT) 25 mg tablet Take 1 Tab by mouth three (3) times daily as needed for Dizziness. 7/17/18  Yes Radha Vidal MD   mometasone (NASONEX) 50 mcg/actuation nasal spray USE 2 SPRAYS IN EACH NOSTRIL EVERY DAY 3/6/18  Yes Radha Vidal MD   topiramate (TOPAMAX) 200 mg tablet Take  by mouth nightly. Yes Other, MD Eugene   pantoprazole (PROTONIX) 40 mg tablet Take 1 Tab by mouth daily.  12/3/19   Yolette Wolfe MD     Allergies   Allergen Reactions    Tamiflu [Oseltamivir Phosphate] Hives       REVIEW OF SYSTEMS as noted below except that noted in subjective:  General: negative for - chills or fever  ENT: negative for - headaches, nasal congestion or tinnitus  Respiratory: negative for - cough, hemoptysis, shortness of breath or wheezing  Cardiovascular : negative for - chest pain, edema, palpitations or shortness of breath  Gastrointestinal: negative for - abdominal pain, blood in stools, heartburn or nausea/vomiting  Genito-Urinary: no dysuria, trouble voiding, or hematuria  Musculoskeletal: negative for - gait disturbance, joint pain, joint stiffness or joint swelling  Neurological: no TIA or stroke symptoms  Hematologic: no bruises, no bleeding, no swollen glands  Integument: no lumps, mole changes, nail changes or rash  Endocrine:no malaise/lethargy or unexpected weight changes      Patient Active Problem List   Diagnosis Code    Dyslipidemia E78.5    Headache R51    Migraines G43.909    HTN (hypertension) I10    Spinal stenosis M48.00    Prediabetes R73.03    Sinus congestion R09.81    Lower urinary tract infectious disease N39.0    Bereavement Z63.4    Rash R21    Dense breasts R92.2    Mild depression (HCC) F32.0    CKD (chronic kidney disease), stage III (HCC) N18.3    Abdominal pain R10.9     Patient Active Problem List    Diagnosis Date Noted    Abdominal pain     Mild depression ([facility] [address] 07/17/2018    CKD (chronic kidney disease), stage III (HCC) 03/16/2017    Dense breasts     Bereavement     Rash     Migraines     HTN (hypertension)     Spinal stenosis     Prediabetes     Sinus congestion     Lower urinary tract infectious disease     Dyslipidemia     Headache      Current Outpatient Medications   Medication Sig Dispense Refill    erenumab-aooe (A[employer]IG AUTOINJECTOR SC) Aimovig Autoinjector      losartan (COZAAR) 100 mg tablet Take 1 Tab by mouth daily. 30 Tab 5    galcanezumab-gnlm (EMGALITY SYRINGE) 120 mg/mL syrg by SubCUTAneous route.  atorvastatin (LIPITOR) 80 mg tablet TAKE 1 TABLET BY MOUTH EVERY DAY 90 Tab 3    SUMAtriptan (IMITREX) 100 mg tablet Take 100 mg by mouth once as needed for Migraine.  amLODIPine (NORVASC) 5 mg tablet Take 1 Tab by mouth daily. 30 Tab 11    traZODone (DESYREL) 100 mg tablet Take 1 Tab by mouth nightly. 180 Tab 1    SALINE NOSE 0.65 % nasal squeeze bottle USE 2 SPRAYS IN EACH NOSTRIL 4 TIMES DAILY 44 mL 11    meclizine (ANTIVERT) 25 mg tablet Take 1 Tab by mouth three (3) times daily as needed for Dizziness.  60 Tab 11    mometasone (NASONEX) 50 mcg/actuation nasal spray USE 2 SPRAYS IN EACH NOSTRIL EVERY DAY 1 Container 11    topiramate (TOPAMAX) 200 mg tablet Take  by mouth nightly.  pantoprazole (PROTONIX) 40 mg tablet Take 1 Tab by mouth daily. 30 Tab 2     Allergies   Allergen Reactions    Tamiflu [Oseltamivir Phosphate] Hives     Past Medical History:   Diagnosis Date    Abdominal pain     Bereavement 3/16    father - dementia - age 80    CKD (chronic kidney disease), stage III ([facility] [address] 03/16/2017    Dense breasts     Depression     Dyslipidemia     Headache     High cholesterol     HTN (hypertension)     Hypertension     Migraines     Prediabetes     Rash     S/P colonoscopy 04/18/2019    renee guzman md - tubular adenoma    Sinus congestion     Spinal stenosis     UTI (lower urinary tract infection)      Past Surgical History:   Procedure Laterality Date    HX BREAST BIOPSY Right 2000    neg; surgical bx    HX COLONOSCOPY      HX GYN      HX HYSTERECTOMY       Family History   Problem Relation Age of Onset    Stroke Mother      Social History     Tobacco Use    Smoking status: Never Smoker    Smokeless tobacco: Never Used   Substance Use Topics    Alcohol use: No       ROS    Objective:   Vital Signs: (As obtained by patient/caregiver at home)  There were no vitals taken for this visit.      [INSTRUCTIONS:  \"[x]\" Indicates a positive item  \"[]\" Indicates a negative item  -- DELETE ALL ITEMS NOT EXAMINED]    Constitutional: [x] Appears well-developed and well-nourished [x] No apparent distress      [] Abnormal -     Mental status: [x] Alert and awake  [x] Oriented to person/place/time [x] Able to follow commands    [] Abnormal -     Eyes:   EOM    [x]  Normal    [] Abnormal -   Sclera  [x]  Normal    [] Abnormal -          Discharge [x]  None visible   [] Abnormal -     HENT: [x] Normocephalic, atraumatic  [] Abnormal -   [x] Mouth/Throat: Mucous membranes are moist    External Ears [x] Normal  [] Abnormal -    Neck: [x] No visualized mass [] Abnormal -     Pulmonary/Chest: [x] Respiratory effort normal   [x] No visualized signs of difficulty breathing or respiratory distress        [] Abnormal -      Musculoskeletal:   [x] Normal gait with no signs of ataxia         [x] Normal range of motion of neck        [] Abnormal -     Neurological:        [x] No Facial Asymmetry (Cranial nerve 7 motor function) (limited exam due to video visit)          [x] No gaze palsy        [] Abnormal -          Skin:        [x] No significant exanthematous lesions or discoloration noted on facial skin         [] Abnormal -            Psychiatric:       [x] Normal Affect [] Abnormal -        [x] No Hallucinations    Other pertinent observable physical exam findings:-        We discussed the expected course, resolution and complications of the diagnosis(es) in detail. Medication risks, benefits, costs, interactions, and alternatives were discussed as indicated. I advised her to contact the office if her condition worsens, changes or fails to improve as anticipated. She expressed understanding with the diagnosis(es) and plan. Kaushik Prajapati is a 77 y.o. female who was evaluated by a video visit encounter for concerns as above. Patient identification was verified prior to start of the visit. A caregiver was present when appropriate. Due to this being a TeleHealth encounter (During [facility]- public health emergency), evaluation of the following organ systems was limited: Vitals/Constitutional/EENT/Resp/CV/GI//MS/Neuro/Skin/Heme-Lymph-Imm. Pursuant to the emergency declaration under the [facility] waiver authority and the [employer] Act, this Virtual  Visit was conducted, with patient's (and/or legal guardian's) consent, to reduce the patient's risk of exposure to COVID-19 and provide necessary medical care.      Services were provided through a video synchronous discussion virtually to substitute for in-person clinic visit. Patient and provider were located at their individual homes. Kong Lawrence MD    Please note that portions of this dictation may have been recorded with voice recognition software. Some unanticipated grammatical, syntax, homophones, and other interpretive errors are inadvertently transcribed by the computer software. An attempt at proof reading has been made to minimize errors and omissions. Please disregard these errors. Thank you.

## 2020-06-03 RX ORDER — TRAZODONE HYDROCHLORIDE 100 MG/1
TABLET ORAL
Qty: 90 TAB | Refills: 3 | Status: SHIPPED | OUTPATIENT
Start: 2020-06-03 | End: 2021-06-02

## 2020-09-11 ENCOUNTER — HOSPITAL ENCOUNTER (OUTPATIENT)
Dept: MAMMOGRAPHY | Age: 70
Discharge: HOME | End: 2020-09-11
Attending: INTERNAL MEDICINE
Payer: MEDICARE

## 2020-09-11 DIAGNOSIS — Z12.31 VISIT FOR SCREENING MAMMOGRAM: ICD-10-CM

## 2020-09-11 PROCEDURE — 77067 SCR MAMMO BI INCL CAD: CPT

## 2020-11-05 PROBLEM — Z12.11 ENCOUNTER FOR HEMOCCULT SCREENING: Status: ACTIVE | Noted: 2020-10-20

## 2020-11-10 RX ORDER — ATORVASTATIN CALCIUM 80 MG/1
TABLET, FILM COATED ORAL
Qty: 90 TAB | Refills: 3 | Status: SHIPPED | OUTPATIENT
Start: 2020-11-10 | End: 2021-10-21

## 2020-11-12 ENCOUNTER — VIRTUAL VISIT (OUTPATIENT)
Dept: INTERNAL MEDICINE CLINIC | Age: 70
End: 2020-11-12
Payer: MEDICARE

## 2020-11-12 DIAGNOSIS — N18.30 STAGE 3 CHRONIC KIDNEY DISEASE, UNSPECIFIED WHETHER STAGE 3A OR 3B CKD (HCC): ICD-10-CM

## 2020-11-12 DIAGNOSIS — R10.13 EPIGASTRIC PAIN: Primary | ICD-10-CM

## 2020-11-12 DIAGNOSIS — E78.5 DYSLIPIDEMIA: ICD-10-CM

## 2020-11-12 DIAGNOSIS — I10 ESSENTIAL HYPERTENSION: ICD-10-CM

## 2020-11-12 PROCEDURE — 1090F PRES/ABSN URINE INCON ASSESS: CPT | Performed by: INTERNAL MEDICINE

## 2020-11-12 PROCEDURE — 99214 OFFICE O/P EST MOD 30 MIN: CPT | Performed by: INTERNAL MEDICINE

## 2020-11-12 PROCEDURE — 1101F PT FALLS ASSESS-DOCD LE1/YR: CPT | Performed by: INTERNAL MEDICINE

## 2020-11-12 PROCEDURE — G9899 SCRN MAM PERF RSLTS DOC: HCPCS | Performed by: INTERNAL MEDICINE

## 2020-11-12 PROCEDURE — G8427 DOCREV CUR MEDS BY ELIG CLIN: HCPCS | Performed by: INTERNAL MEDICINE

## 2020-11-12 PROCEDURE — G8400 PT W/DXA NO RESULTS DOC: HCPCS | Performed by: INTERNAL MEDICINE

## 2020-11-12 PROCEDURE — G8756 NO BP MEASURE DOC: HCPCS | Performed by: INTERNAL MEDICINE

## 2020-11-12 PROCEDURE — 3017F COLORECTAL CA SCREEN DOC REV: CPT | Performed by: INTERNAL MEDICINE

## 2020-11-12 PROCEDURE — G9717 DOC PT DX DEP/BP F/U NT REQ: HCPCS | Performed by: INTERNAL MEDICINE

## 2020-11-12 RX ORDER — CEFUROXIME AXETIL 500 MG/1
500 TABLET ORAL 2 TIMES DAILY
Qty: 20 TAB | Refills: 0 | Status: SHIPPED | OUTPATIENT
Start: 2020-11-12 | End: 2020-11-22

## 2020-11-12 NOTE — PROGRESS NOTES
Axel Armenta is a 72 y.o. female who was seen by synchronous (real-time) audio-video technology on 11/12/2020 for Abdominal Pain        Assessment & Plan:   Diagnoses and all orders for this visit:    1. Epigastric pain the abdominal pain may well very related to constipation she refuses to drink water having had only 1 glass today    2. Stage 3 chronic kidney disease, unspecified whether stage 3a or 3b CKD we encourage fluid intake. 3. Essential hypertension we also encouraged her to take her medications regularly. 4. Dyslipidemia we encouraged medication adherence and a heart healthy diet    Other orders  -     cefUROXime (CEFTIN) 500 mg tablet; Take 1 Tab by mouth two (2) times a day for 10 days. I think the abdominal pain may very well be related to constipation and lack of fluid intake. We suggest the use of MiraLAX twice a day as she come to see us in 2 weeks if there is no improvement and suggested the 6 bottles of water with additives that would allow her to feel comfortable drinking the water. Subjective:   Patient seen at her home for televisit. Since we last saw her she has had severe abdominal pain. She went to [facility] in October without adequate relief she supplemented the [place] in October was told to be constipated she had a CT scan done she had blood work done and was advised to use MiraLAX. She also complains of sinus drainage and facial pain from her sinuses. Patient seen for evaluation    Prior to Admission medications    Medication Sig Start Date End Date Taking? Authorizing Provider   onabotulinumtoxinA (BOTOX INJECTION) Botox   Yes Provider, Historical   cefUROXime (CEFTIN) 500 mg tablet Take 1 Tab by mouth two (2) times a day for 10 days.  11/12/20 11/22/20 Yes Belinda Sow MD   atorvastatin (LIPITOR) 80 mg tablet TAKE 1 TABLET BY MOUTH EVERY DAY 11/10/20  Yes Belinda Sow MD   losartan (COZAAR) 100 mg tablet TAKE 1 TABLET BY MOUTH EVERY DAY 10/31/20 Yes Damon Box MD   traZODone (DESYREL) 100 mg tablet TAKE 1 TABLET BY MOUTH EVERY DAY AT NIGHT 6/3/20  Yes Damon Box MD   erenumab-aooe (AIMOVIG AUTOINJECTOR SC) Aimovig Autoinjector   Yes Provider, Historical   galcanezumab-gnlm (EMGALITY SYRINGE) 120 mg/mL syrg by SubCUTAneous route. Yes Provider, Historical   pantoprazole (PROTONIX) 40 mg tablet Take 1 Tab by mouth daily. 12/3/19  Yes Damon Box MD   SUMAtriptan (IMITREX) 100 mg tablet Take 100 mg by mouth once as needed for Migraine. Yes Provider, Historical   amLODIPine (NORVASC) 5 mg tablet Take 1 Tab by mouth daily. 7/30/19  Yes Damon Box MD   SALINE NOSE 0.65 % nasal squeeze bottle USE 2 SPRAYS IN EACH NOSTRIL 4 TIMES DAILY 4/23/19  Yes Damon Box MD   meclizine (ANTIVERT) 25 mg tablet Take 1 Tab by mouth three (3) times daily as needed for Dizziness. 7/17/18  Yes Damon Box MD   mometasone (NASONEX) 50 mcg/actuation nasal spray USE 2 SPRAYS IN EACH NOSTRIL EVERY DAY 3/6/18  Yes Damon Box MD   topiramate (TOPAMAX) 200 mg tablet Take  by mouth nightly.      Yes Other, MD Eugene     Patient Active Problem List   Diagnosis Code    Dyslipidemia E78.5    Headache R51.9    Migraines G43.909    HTN (hypertension) I10    Spinal stenosis M48.00    Prediabetes R73.03    Sinus congestion R09.81    Lower urinary tract infectious disease N39.0    Bereavement Z63.4    Rash R21    Dense breasts R92.2    Mild depression ([address] F32.0    CKD (chronic kidney disease), stage III N18.30    Abdominal pain R10.9    Encounter for Hemoccult screening Z12.11     Patient Active Problem List    Diagnosis Date Noted    Encounter for Hemoccult screening 10/20/2020    Abdominal pain     Mild depression ([address] 07/17/2018    CKD (chronic kidney disease), stage III 03/16/2017    Dense breasts     Bereavement     Rash     Migraines     HTN (hypertension)     Spinal stenosis     Prediabetes     Sinus congestion     Lower urinary tract infectious disease     Dyslipidemia     Headache      Current Outpatient Medications   Medication Sig Dispense Refill    onabotulinumtoxinA (BOTOX INJECTION) Botox      cefUROXime (CEFTIN) 500 mg tablet Take 1 Tab by mouth two (2) times a day for 10 days. 20 Tab 0    atorvastatin (LIPITOR) 80 mg tablet TAKE 1 TABLET BY MOUTH EVERY DAY 90 Tab 3    losartan (COZAAR) 100 mg tablet TAKE 1 TABLET BY MOUTH EVERY DAY 90 Tab 3    traZODone (DESYREL) 100 mg tablet TAKE 1 TABLET BY MOUTH EVERY DAY AT NIGHT 90 Tab 3    erenumab-aooe (AIMOVIG AUTOINJECTOR SC) Aimovig Autoinjector      galcanezumab-gnlm (EMGALITY SYRINGE) 120 mg/mL syrg by SubCUTAneous route.  pantoprazole (PROTONIX) 40 mg tablet Take 1 Tab by mouth daily. 30 Tab 2    SUMAtriptan (IMITREX) 100 mg tablet Take 100 mg by mouth once as needed for Migraine.  amLODIPine (NORVASC) 5 mg tablet Take 1 Tab by mouth daily. 30 Tab 11    SALINE NOSE 0.65 % nasal squeeze bottle USE 2 SPRAYS IN EACH NOSTRIL 4 TIMES DAILY 44 mL 11    meclizine (ANTIVERT) 25 mg tablet Take 1 Tab by mouth three (3) times daily as needed for Dizziness. 60 Tab 11    mometasone (NASONEX) 50 mcg/actuation nasal spray USE 2 SPRAYS IN EACH NOSTRIL EVERY DAY 1 Container 11    topiramate (TOPAMAX) 200 mg tablet Take  by mouth nightly.          Allergies   Allergen Reactions    Tamiflu [Oseltamivir Phosphate] Hives     Past Medical History:   Diagnosis Date    Abdominal pain     Bereavement 3/16    father - dementia - age 80    CKD (chronic kidney disease), stage III 03/16/2017    Dense breasts     Depression     Dyslipidemia     Encounter for Hemoccult screening 10/20/2020    fit  neg    Headache     High cholesterol     HTN (hypertension)     Hypertension     Migraines     Prediabetes     Rash     S/P colonoscopy 04/18/2019    renee guzman md - tubular adenoma    Sinus congestion     Spinal stenosis     UTI (lower urinary tract infection)      Past Surgical History:   Procedure Laterality Date    HX BREAST BIOPSY Right 2000    neg; surgical bx    HX COLONOSCOPY      HX GYN      HX HYSTERECTOMY       Family History   Problem Relation Age of Onset    Stroke Mother      Social History     Tobacco Use    Smoking status: Never Smoker    Smokeless tobacco: Never Used   Substance Use Topics    Alcohol use: No           Objective:   No flowsheet data found. [INSTRUCTIONS:  \"[x]\" Indicates a positive item  \"[]\" Indicates a negative item  -- DELETE ALL ITEMS NOT EXAMINED]    Constitutional: [x] Appears well-developed and well-nourished [x] No apparent distress      [] Abnormal -     Mental status: [x] Alert and awake  [x] Oriented to person/place/time [x] Able to follow commands    [] Abnormal -     Eyes:   EOM    [x]  Normal    [] Abnormal -   Sclera  [x]  Normal    [] Abnormal -          Discharge [x]  None visible   [] Abnormal -     HENT: [x] Normocephalic, atraumatic  [] Abnormal -   [x] Mouth/Throat: Mucous membranes are moist    External Ears [x] Normal  [] Abnormal -    Neck: [x] No visualized mass [] Abnormal -     Pulmonary/Chest: [x] Respiratory effort normal   [x] No visualized signs of difficulty breathing or respiratory distress        [] Abnormal -      Musculoskeletal:   [x] Normal gait with no signs of ataxia         [x] Normal range of motion of neck        [] Abnormal -     Neurological:        [x] No Facial Asymmetry (Cranial nerve 7 motor function) (limited exam due to video visit)          [x] No gaze palsy        [] Abnormal -          Skin:        [x] No significant exanthematous lesions or discoloration noted on facial skin         [] Abnormal -            Psychiatric:       [x] Normal Affect [] Abnormal -        [x] No Hallucinations    Other pertinent observable physical exam findings:-        We discussed the expected course, resolution and complications of the diagnosis(es) in detail. Medication risks, benefits, costs, interactions, and alternatives were discussed as indicated. I advised her to contact the office if her condition worsens, changes or fails to improve as anticipated. She expressed understanding with the diagnosis(es) and plan. Davis Vasques, who was evaluated through a patient-initiated, synchronous (real-time) audio-video encounter, and/or her healthcare decision maker, is aware that it is a billable service, with coverage as determined by her insurance carrier. She provided verbal consent to proceed: Yes, and patient identification was verified. It was conducted pursuant to the emergency declaration under the [address] [place] [address], [address] [place] [address] authority and the [employer] General Act. A caregiver was present when appropriate. Ability to conduct physical exam was limited. I was at home. The patient was at home. Bhavani Larose MD    Please note that portions of this dictation may have been recorded with voice recognition software. Some unanticipated grammatical, syntax, homophones, and other interpretive errors are inadvertently transcribed by the computer software. An attempt at proof reading has been made to minimize errors and omissions. Please disregard these errors. Thank you.

## 2020-11-12 NOTE — PROGRESS NOTES
Patient was instructed to come in office for visit with complaint of abdominal pain.  Patient will be scheduled to come in tomorrow per Dr. Miah Higgins LPN

## 2020-11-12 NOTE — PROGRESS NOTES
1. Have you been to the ER, urgent care clinic since your last visit? Hospitalized since your last visit? Yes When: 9-4-2020 Reason for visit: abdominal pain    2. Have you seen or consulted any other health care providers outside of the [address] [place] [address] since your last visit? Include any pap smears or colon screening.  Yes Where: [facility]     Wants to discuss abdominal pain

## 2020-12-02 ENCOUNTER — OFFICE VISIT (OUTPATIENT)
Dept: INTERNAL MEDICINE CLINIC | Age: 70
End: 2020-12-02
Payer: MEDICARE

## 2020-12-02 VITALS
HEIGHT: 65 IN | BODY MASS INDEX: 25.56 KG/M2 | HEART RATE: 75 BPM | DIASTOLIC BLOOD PRESSURE: 72 MMHG | OXYGEN SATURATION: 98 % | RESPIRATION RATE: 18 BRPM | SYSTOLIC BLOOD PRESSURE: 130 MMHG | WEIGHT: 153.4 LBS | TEMPERATURE: 98.6 F

## 2020-12-02 DIAGNOSIS — R10.13 EPIGASTRIC PAIN: ICD-10-CM

## 2020-12-02 DIAGNOSIS — R73.03 PREDIABETES: ICD-10-CM

## 2020-12-02 DIAGNOSIS — E78.5 DYSLIPIDEMIA: ICD-10-CM

## 2020-12-02 DIAGNOSIS — D36.9 TUBULAR ADENOMA: ICD-10-CM

## 2020-12-02 DIAGNOSIS — I10 ESSENTIAL HYPERTENSION: ICD-10-CM

## 2020-12-02 DIAGNOSIS — G43.909 MIGRAINE WITHOUT STATUS MIGRAINOSUS, NOT INTRACTABLE, UNSPECIFIED MIGRAINE TYPE: ICD-10-CM

## 2020-12-02 DIAGNOSIS — Z00.00 MEDICARE ANNUAL WELLNESS VISIT, SUBSEQUENT: Primary | ICD-10-CM

## 2020-12-02 DIAGNOSIS — N18.30 STAGE 3 CHRONIC KIDNEY DISEASE, UNSPECIFIED WHETHER STAGE 3A OR 3B CKD (HCC): ICD-10-CM

## 2020-12-02 DIAGNOSIS — M48.07 SPINAL STENOSIS OF LUMBOSACRAL REGION: ICD-10-CM

## 2020-12-02 DIAGNOSIS — Z13.31 SCREENING FOR DEPRESSION: ICD-10-CM

## 2020-12-02 PROCEDURE — 1090F PRES/ABSN URINE INCON ASSESS: CPT | Performed by: INTERNAL MEDICINE

## 2020-12-02 PROCEDURE — G8400 PT W/DXA NO RESULTS DOC: HCPCS | Performed by: INTERNAL MEDICINE

## 2020-12-02 PROCEDURE — G8754 DIAS BP LESS 90: HCPCS | Performed by: INTERNAL MEDICINE

## 2020-12-02 PROCEDURE — G9899 SCRN MAM PERF RSLTS DOC: HCPCS | Performed by: INTERNAL MEDICINE

## 2020-12-02 PROCEDURE — G0439 PPPS, SUBSEQ VISIT: HCPCS | Performed by: INTERNAL MEDICINE

## 2020-12-02 PROCEDURE — 36415 COLL VENOUS BLD VENIPUNCTURE: CPT | Performed by: INTERNAL MEDICINE

## 2020-12-02 PROCEDURE — G9717 DOC PT DX DEP/BP F/U NT REQ: HCPCS | Performed by: INTERNAL MEDICINE

## 2020-12-02 PROCEDURE — 99213 OFFICE O/P EST LOW 20 MIN: CPT | Performed by: INTERNAL MEDICINE

## 2020-12-02 PROCEDURE — G8752 SYS BP LESS 140: HCPCS | Performed by: INTERNAL MEDICINE

## 2020-12-02 PROCEDURE — 3017F COLORECTAL CA SCREEN DOC REV: CPT | Performed by: INTERNAL MEDICINE

## 2020-12-02 PROCEDURE — 1101F PT FALLS ASSESS-DOCD LE1/YR: CPT | Performed by: INTERNAL MEDICINE

## 2020-12-02 PROCEDURE — G8427 DOCREV CUR MEDS BY ELIG CLIN: HCPCS | Performed by: INTERNAL MEDICINE

## 2020-12-02 PROCEDURE — G8419 CALC BMI OUT NRM PARAM NOF/U: HCPCS | Performed by: INTERNAL MEDICINE

## 2020-12-02 PROCEDURE — G8536 NO DOC ELDER MAL SCRN: HCPCS | Performed by: INTERNAL MEDICINE

## 2020-12-02 RX ORDER — CYCLOBENZAPRINE HCL 10 MG
10 TABLET ORAL
Qty: 60 TAB | Refills: 2 | Status: SHIPPED | OUTPATIENT
Start: 2020-12-02 | End: 2022-01-27 | Stop reason: ALTCHOICE

## 2020-12-02 RX ORDER — POLYMYXIN B SULFATE AND TRIMETHOPRIM 1; 10000 MG/ML; [USP'U]/ML
1 SOLUTION OPHTHALMIC EVERY 4 HOURS
Qty: 1 BOTTLE | Refills: 0 | Status: SHIPPED | OUTPATIENT
Start: 2020-12-02 | End: 2020-12-12

## 2020-12-02 NOTE — PROGRESS NOTES
1. Have you been to the ER, urgent care clinic since your last visit? Hospitalized since your last visit? No    2. Have you seen or consulted any other health care providers outside of the [address] [place] [address] since your last visit? Include any pap smears or colon screening. No    Abdominal pain  This is the Subsequent Medicare Annual Wellness Exam, performed 12 months or more after the Initial AWV or the last Subsequent AWV    I have reviewed the patient's medical history in detail and updated the computerized patient record. Depression Risk Factor Screening:     3 most recent PHQ Screens 9/9/2014   Little interest or pleasure in doing things Not at all   Feeling down, depressed, irritable, or hopeless Not at all   Total Score PHQ 2 0       Alcohol Risk Screen   Do you average more than 1 drink per night or more than 7 drinks a week:  No    On any one occasion in the past three months have you have had more than 3 drinks containing alcohol:  No        Functional Ability and Level of Safety:   Hearing: Hearing is good. Activities of Daily Living: The home contains: no safety equipment. Patient does total self care     Ambulation: with no difficulty     Fall Risk:  Fall Risk Assessment, last 12 mths 12/2/2020   Able to walk? Yes   Fall in past 12 months? No     Abuse Screen:  Patient is not abused       Cognitive Screening   Has your family/caregiver stated any concerns about your memory: no     Cognitive Screening: not necessary    Assessment/Plan   Education and counseling provided:  Are appropriate based on today's review and evaluation    Diagnoses and all orders for this visit:    1. Epigastric pain    2. Stage 3 chronic kidney disease, unspecified whether stage 3a or 3b CKD  -     URINALYSIS W/ RFLX MICROSCOPIC; Future  -     CBC WITH AUTOMATED DIFF; Future  -     METABOLIC PANEL, COMPREHENSIVE; Future  -     LIPID PANEL; Future  -     TSH 3RD GENERATION;  Future  -     COLLECTION VENOUS BLOOD,VENIPUNCTURE  -     HEMOGLOBIN A1C WITH EAG; Future    3. Prediabetes    4. Spinal stenosis of lumbosacral region    5. Essential hypertension    6. Migraine without status migrainosus, not intractable, unspecified migraine type    7.  Dyslipidemia        Health Maintenance Due     Health Maintenance Due   Topic Date Due    Shingrix Vaccine Age 49> (1 of 2) 09/29/2001    Pneumococcal 65+ years (1 of 1 - PPSV23) 09/29/2016    GLAUCOMA SCREENING Q2Y  09/07/2019    Medicare Yearly Exam  07/30/2020    A1C test (Diabetic or Prediabetic)  12/03/2020    Lipid Screen  12/03/2020       Patient Care Team   Patient Care Team:  James Stewart MD as PCP - General (Internal Medicine)  James Stewart MD as PCP - [facility] Empaneled Provider    History     Patient Active Problem List   Diagnosis Code    Dyslipidemia E78.5    Headache R51.9    Migraines G43.909    HTN (hypertension) I10    Spinal stenosis M48.00    Prediabetes R73.03    Sinus congestion R09.81    Lower urinary tract infectious disease N39.0    Bereavement Z63.4    Rash R21    Dense breasts R92.2    Mild depression ([address] F32.0    CKD (chronic kidney disease), stage III N18.30    Abdominal pain R10.9    Encounter for Hemoccult screening Z12.11     Past Medical History:   Diagnosis Date    Abdominal pain     Bereavement 3/16    father - dementia - age 80    CKD (chronic kidney disease), stage III 03/16/2017    Dense breasts     Depression     Dyslipidemia     Encounter for Hemoccult screening 10/20/2020    fit  neg    Headache     High cholesterol     HTN (hypertension)     Hypertension     Migraines     Prediabetes     Rash     S/P colonoscopy 04/18/2019    renee guzman md - tubular adenoma    Sinus congestion     Spinal stenosis     UTI (lower urinary tract infection)       Past Surgical History:   Procedure Laterality Date    HX BREAST BIOPSY Right 2000    neg; surgical bx    HX COLONOSCOPY      HX GYN      HX HYSTERECTOMY       Current Outpatient Medications   Medication Sig Dispense Refill    onabotulinumtoxinA (BOTOX INJECTION) Botox      atorvastatin (LIPITOR) 80 mg tablet TAKE 1 TABLET BY MOUTH EVERY DAY 90 Tab 3    losartan (COZAAR) 100 mg tablet TAKE 1 TABLET BY MOUTH EVERY DAY 90 Tab 3    traZODone (DESYREL) 100 mg tablet TAKE 1 TABLET BY MOUTH EVERY DAY AT NIGHT 90 Tab 3    erenumab-aooe (AIMOVIG AUTOINJECTOR SC) Aimovig Autoinjector      SUMAtriptan (IMITREX) 100 mg tablet Take 100 mg by mouth once as needed for Migraine.  amLODIPine (NORVASC) 5 mg tablet Take 1 Tab by mouth daily. 30 Tab 11    SALINE NOSE 0.65 % nasal squeeze bottle USE 2 SPRAYS IN EACH NOSTRIL 4 TIMES DAILY 44 mL 11    meclizine (ANTIVERT) 25 mg tablet Take 1 Tab by mouth three (3) times daily as needed for Dizziness. 60 Tab 11    mometasone (NASONEX) 50 mcg/actuation nasal spray USE 2 SPRAYS IN EACH NOSTRIL EVERY DAY 1 Container 11    topiramate (TOPAMAX) 200 mg tablet Take  by mouth nightly.  galcanezumab-gnlm (EMGALITY SYRINGE) 120 mg/mL syrg by SubCUTAneous route.  pantoprazole (PROTONIX) 40 mg tablet Take 1 Tab by mouth daily. 30 Tab 2     Allergies   Allergen Reactions    Tamiflu [Oseltamivir Phosphate] Hives       Family History   Problem Relation Age of Onset    Stroke Mother      Social History     Tobacco Use    Smoking status: Never Smoker    Smokeless tobacco: Never Used   Substance Use Topics    Alcohol use:  No

## 2020-12-02 NOTE — PATIENT INSTRUCTIONS
Medicare Wellness Visit, Female     The best way to live healthy is to have a lifestyle where you eat a well-balanced diet, exercise regularly, limit alcohol use, and quit all forms of tobacco/nicotine, if applicable. Regular preventive services are another way to keep healthy. Preventive services (vaccines, screening tests, monitoring & exams) can help personalize your care plan, which helps you manage your own care. Screening tests can find health problems at the earliest stages, when they are easiest to treat. [facility] follows the current, evidence-based guidelines published by the [facility] Orlando Carter ([facility]) when recommending preventive services for our patients. Because we follow these guidelines, sometimes recommendations change over time as research supports it. (For example, mammograms used to be recommended annually. Even though Medicare will still pay for an annual mammogram, the newer guidelines recommend a mammogram every two years for women of average risk). Of course, you and your doctor may decide to screen more often for some diseases, based on your risk and your co-morbidities (chronic disease you are already diagnosed with). Preventive services for you include:  - Medicare offers their members a free annual wellness visit, which is time for you and your primary care provider to discuss and plan for your preventive service needs. Take advantage of this benefit every year!  -All adults over the age of 72 should receive the recommended pneumonia vaccines. Current USPSTF guidelines recommend a series of two vaccines for the best pneumonia protection.   -All adults should have a flu vaccine yearly and a tetanus vaccine every 10 years.   -All adults age 48 and older should receive the shingles vaccines (series of two vaccines).       -All adults age 38-68 who are overweight should have a diabetes screening test once every three years.   -All adults born between [birth year] and 1965 should be screened once for Hepatitis C.  -Other screening tests and preventive services for persons with diabetes include: an eye exam to screen for diabetic retinopathy, a kidney function test, a foot exam, and stricter control over your cholesterol.   -Cardiovascular screening for adults with routine risk involves an electrocardiogram (ECG) at intervals determined by your doctor.   -Colorectal cancer screenings should be done for adults age 54-65 with no increased risk factors for colorectal cancer. There are a number of acceptable methods of screening for this type of cancer. Each test has its own benefits and drawbacks. Discuss with your doctor what is most appropriate for you during your annual wellness visit. The different tests include: colonoscopy (considered the best screening method), a fecal occult blood test, a fecal DNA test, and sigmoidoscopy.    -A bone mass density test is recommended when a woman turns 65 to screen for osteoporosis. This test is only recommended one time, as a screening. Some providers will use this same test as a disease monitoring tool if you already have osteoporosis. -Breast cancer screenings are recommended every other year for women of normal risk, age 54-69.  -Cervical cancer screenings for women over age 72 are only recommended with certain risk factors.      Here is a list of your current Health Maintenance items (your personalized list of preventive services) with a due date:  Health Maintenance Due   Topic Date Due    Shingles Vaccine (1 of 2) 09/29/2001    Pneumococcal Vaccine (1 of 1 - PPSV23) 09/29/2016    Glaucoma Screening   09/07/2019    Annual Well Visit  07/30/2020    Hemoglobin A1C    12/03/2020    Cholesterol Test   12/03/2020

## 2020-12-02 NOTE — PROGRESS NOTES
SPORTS MEDICINE AND PRIMARY CARE  Alley Palomino MD, [address] [address]  Phone:  [phone]  Fax: [phone]       Chief Complaint   Patient presents with   [facility] Wellness Visit   . SUBJECTIVE:    Jocelynn Lawrence is a 72 y.o. female Patient returns today after a telehealth visit, when we saw her for abdominal pain. She subsequently had a CT scan performed at [facility] on 10/15/20 with the findings of no acute findings to explain the right upper quadrant or lower abdominal pain. 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 No hydronephrosis. Pelvic structure revealed a hysterectomy and bones revealed a posterior L4-L5 fusion and laminectomy. She had a UA that was unremarkable and urine culture that was apparently negative. She was found to have sinusitis. She was given information on the flu and comes in today for follow up with known history of also migraines, hypertension, spinal stenosis, prediabetes, CKD stage 3, and is seen for evaluation. Continues to have abdominal discomfort. Particularly if she does not eat she has discomfort currently in the epigastric area. She also has it in the left lower quadrant area. She was given Ceftin for sinusitis and of course that caused diarrhea, which relieved her stools. She has not had a bowel movement now since Monday, however. Patient complains of drainage in the eyes for the past week or so, clear drainage. She also complains of pain in the scapular muscle area and is seen for evaluation. Current Outpatient Medications   Medication Sig Dispense Refill    trimethoprim-polymyxin b (POLYTRIM) ophthalmic solution Administer 1 Drop to both eyes every four (4) hours for 10 days.  1 Bottle 0    cyclobenzaprine (FLEXERIL) 10 mg tablet Take 1 Tab by mouth three (3) times daily as needed for Muscle Spasm(s). 60 Tab 2    onabotulinumtoxinA (BOTOX INJECTION) Botox      atorvastatin (LIPITOR) 80 mg tablet TAKE 1 TABLET BY MOUTH EVERY DAY 90 Tab 3    losartan (COZAAR) 100 mg tablet TAKE 1 TABLET BY MOUTH EVERY DAY 90 Tab 3    traZODone (DESYREL) 100 mg tablet TAKE 1 TABLET BY MOUTH EVERY DAY AT NIGHT 90 Tab 3    erenumab-aooe (AIMOVIG AUTOINJECTOR SC) Aimovig Autoinjector      SUMAtriptan (IMITREX) 100 mg tablet Take 100 mg by mouth once as needed for Migraine.  amLODIPine (NORVASC) 5 mg tablet Take 1 Tab by mouth daily. 30 Tab 11    SALINE NOSE 0.65 % nasal squeeze bottle USE 2 SPRAYS IN EACH NOSTRIL 4 TIMES DAILY 44 mL 11    meclizine (ANTIVERT) 25 mg tablet Take 1 Tab by mouth three (3) times daily as needed for Dizziness. 60 Tab 11    mometasone (NASONEX) 50 mcg/actuation nasal spray USE 2 SPRAYS IN EACH NOSTRIL EVERY DAY 1 Container 11    topiramate (TOPAMAX) 200 mg tablet Take  by mouth nightly.  galcanezumab-gnlm (EMGALITY SYRINGE) 120 mg/mL syrg by SubCUTAneous route.        Past Medical History:   Diagnosis Date    Abdominal pain     Bereavement 3/16    father - dementia - age 80    CKD (chronic kidney disease), stage III 03/16/2017    Dense breasts     Depression     Dyslipidemia     Encounter for Hemoccult screening 10/20/2020    fit  neg    Headache     High cholesterol     HTN (hypertension)     Hypertension     Migraines     Prediabetes     Rash     S/P colonoscopy 04/18/2019    renee guzman md - tubular adenoma    Sinus congestion     Spinal stenosis     UTI (lower urinary tract infection)      Past Surgical History:   Procedure Laterality Date    HX BREAST BIOPSY Right 2000    neg; surgical bx    HX COLONOSCOPY      HX GYN      HX HYSTERECTOMY       Allergies   Allergen Reactions    Tamiflu [Oseltamivir Phosphate] Hives         REVIEW OF SYSTEMS:  General: negative for - chills or fever  ENT: negative for - headaches, nasal congestion or tinnitus  Respiratory: negative for - cough, hemoptysis, shortness of breath or wheezing  Cardiovascular : negative for - chest pain, edema, palpitations or shortness of breath  Gastrointestinal: negative for - abdominal pain, blood in stools, heartburn or nausea/vomiting  Genito-Urinary: no dysuria, trouble voiding, or hematuria  Musculoskeletal: negative for - gait disturbance, joint pain, joint stiffness or joint swelling  Neurological: no TIA or stroke symptoms  Hematologic: no bruises, no bleeding, no swollen glands  Integument: no lumps, mole changes, nail changes or rash  Endocrine: no malaise/lethargy or unexpected weight changes      Social History     Socioeconomic History    Marital status:      Spouse name: Not on file    Number of children: Not on file    Years of education: Not on file    Highest education level: Not on file   Tobacco Use    Smoking status: Never Smoker    Smokeless tobacco: Never Used   Substance and Sexual Activity    Alcohol use: No    Drug use: No    Sexual activity: Not Currently     Partners: Male     Birth control/protection: None   Social History Narrative         Family History: Mother:  76 yrs, Hypertension and diabetes mi,cvaFather: alive 80 yrs, htnDaughter(s): alive 32 yrsSon(s): alive 28 yrs1 son(s) , 1 daughter(s)         Social History: Alcohol Use Patient uses alcohol, Drinks per occasion: 2, Drinks per w [place]: 0. Smoking Status Patient is a never smoker. Marital Status: . Lives .  left suddenly  returned 2017! 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 Education/School: has highschool diploma, has collegediploma [facility] [employer] [facility]. She developed migraines while she was in the service her Latter day preference is [employer]trem[employer].       Family History   Problem Relation Age of Onset    Stroke Mother        OBJECTIVE:    Visit Vitals  /72   Pulse 75   Temp 98.6 °F (37 °C) (Oral)   Resp 18   Ht 5' 5\" (1.651 m)   Wt 153 lb 6.4 oz (69.6 kg)   SpO2 98%   BMI 25.53 kg/m²     CONSTITUTIONAL: well , well nourished, appears age appropriate  EYES: perrla, eom intact  ENMT:moist mucous membranes, pharynx clear  NECK: supple. Thyroid normal  RESPIRATORY: Chest: clear bilaterally   CARDIOVASCULAR: Heart: regular rate and rhythm  GASTROINTESTINAL: Abdomen: soft, bowel sounds active  HEMATOLOGIC: no pathological lymph nodes palpated  MUSCULOSKELETAL: Extremities: no edema, pulse 1+   INTEGUMENT: No unusual rashes or suspicious skin lesions noted. Nails appear normal.  NEUROLOGIC: non-focal exam   MENTAL STATUS: alert and oriented, appropriate affect           ASSESSMENT:  1. Medicare annual wellness visit, subsequent    2. Epigastric pain    3. Stage 3 chronic kidney disease, unspecified whether stage 3a or 3b CKD    4. Prediabetes    5. Spinal stenosis of lumbosacral region    6. Essential hypertension    7. Migraine without status migrainosus, not intractable, unspecified migraine type    8. Dyslipidemia    9. Screening for depression    10. Tubular adenoma      I suspect the epigastric pain is more related to gastritis, which goes along with the pain in the trapezius muscle area that is stress related. She does not want to take Protonix because she read about the side effects, and therefore we suggest Pepcid that she will take twice a day over the counter. We also suggest she mention this to Dr. Janell Clement when she sees him, as well as her left lower quadrant abdominal discomfort. She has CKD stage 3. She had some blood work done at [facility], but I do not think they checked her kidneys, which is rather unusual since they did dye studies for CT scan. Therefore we will check her kidneys today. She has a history of prediabetes and we will check hemoglobin A1c at the same time. BP control is at goal, no titration is needed. She is on Atorvastatin for cholesterol, which we will confirm today.     She will be back to see us in four to six months or as needed. I have discussed the diagnosis with the patient and the intended plan as seen in the  Orders. The patient understands and agees with the plan. The patient has   received an after visit summary and questions were answered concerning  future plans  Patient labs and/or xrays were reviewed  Past records were reviewed. PLAN:  .  Orders Placed This Encounter    Depression Screen Annual    CBC WITH AUTOMATED DIFF    METABOLIC PANEL, COMPREHENSIVE    LIPID PANEL    TSH 3RD GENERATION    HEMOGLOBIN A1C WITH EAG    REFERRAL TO GASTROENTEROLOGY    trimethoprim-polymyxin b (POLYTRIM) ophthalmic solution    cyclobenzaprine (FLEXERIL) 10 mg tablet       Follow-up and Dispositions    · Return in about 4 months (around 4/2/2021). ATTENTION:   This medical record was transcribed using an electronic medical records system. Although proofread, it may and can contain electronic and spelling errors. Other human spelling and other errors may be present. Corrections may be executed at a later time. Please feel free to contact us for any clarifications as needed.

## 2020-12-03 LAB
ALBUMIN SERPL-MCNC: 4.2 G/DL (ref 3.5–5)
ALBUMIN/GLOB SERPL: 1.3 {RATIO} (ref 1.1–2.2)
ALP SERPL-CCNC: 60 U/L (ref 45–117)
ALT SERPL-CCNC: 23 U/L (ref 12–78)
ANION GAP SERPL CALC-SCNC: 8 MMOL/L (ref 5–15)
AST SERPL-CCNC: 19 U/L (ref 15–37)
BASOPHILS # BLD: 0.1 K/UL (ref 0–0.1)
BASOPHILS NFR BLD: 2 % (ref 0–1)
BILIRUB SERPL-MCNC: 0.5 MG/DL (ref 0.2–1)
BUN SERPL-MCNC: 12 MG/DL (ref 6–20)
BUN/CREAT SERPL: 11 (ref 12–20)
CALCIUM SERPL-MCNC: 9.5 MG/DL (ref 8.5–10.1)
CHLORIDE SERPL-SCNC: 113 MMOL/L (ref 97–108)
CHOLEST SERPL-MCNC: 172 MG/DL
CO2 SERPL-SCNC: 22 MMOL/L (ref 21–32)
CREAT SERPL-MCNC: 1.13 MG/DL (ref 0.55–1.02)
DIFFERENTIAL METHOD BLD: ABNORMAL
EOSINOPHIL # BLD: 0.2 K/UL (ref 0–0.4)
EOSINOPHIL NFR BLD: 4 % (ref 0–7)
ERYTHROCYTE [DISTWIDTH] IN BLOOD BY AUTOMATED COUNT: 14 % (ref 11.5–14.5)
EST. AVERAGE GLUCOSE BLD GHB EST-MCNC: 117 MG/DL
GLOBULIN SER CALC-MCNC: 3.3 G/DL (ref 2–4)
GLUCOSE SERPL-MCNC: 92 MG/DL (ref 65–100)
HBA1C MFR BLD: 5.7 % (ref 4–5.6)
HCT VFR BLD AUTO: 40.6 % (ref 35–47)
HDLC SERPL-MCNC: 54 MG/DL
HDLC SERPL: 3.2 {RATIO} (ref 0–5)
HGB BLD-MCNC: 12.6 G/DL (ref 11.5–16)
IMM GRANULOCYTES # BLD AUTO: 0 K/UL (ref 0–0.04)
IMM GRANULOCYTES NFR BLD AUTO: 0 % (ref 0–0.5)
LDLC SERPL CALC-MCNC: 98.8 MG/DL (ref 0–100)
LIPID PROFILE,FLP: NORMAL
LYMPHOCYTES # BLD: 2 K/UL (ref 0.8–3.5)
LYMPHOCYTES NFR BLD: 40 % (ref 12–49)
MCH RBC QN AUTO: 29 PG (ref 26–34)
MCHC RBC AUTO-ENTMCNC: 31 G/DL (ref 30–36.5)
MCV RBC AUTO: 93.3 FL (ref 80–99)
MONOCYTES # BLD: 0.4 K/UL (ref 0–1)
MONOCYTES NFR BLD: 7 % (ref 5–13)
NEUTS SEG # BLD: 2.3 K/UL (ref 1.8–8)
NEUTS SEG NFR BLD: 47 % (ref 32–75)
NRBC # BLD: 0 K/UL (ref 0–0.01)
NRBC BLD-RTO: 0 PER 100 WBC
PLATELET # BLD AUTO: 330 K/UL (ref 150–400)
PMV BLD AUTO: 9.7 FL (ref 8.9–12.9)
POTASSIUM SERPL-SCNC: 3.8 MMOL/L (ref 3.5–5.1)
PROT SERPL-MCNC: 7.5 G/DL (ref 6.4–8.2)
RBC # BLD AUTO: 4.35 M/UL (ref 3.8–5.2)
SODIUM SERPL-SCNC: 143 MMOL/L (ref 136–145)
TRIGL SERPL-MCNC: 96 MG/DL (ref ?–150)
TSH SERPL DL<=0.05 MIU/L-ACNC: 0.7 UIU/ML (ref 0.36–3.74)
VLDLC SERPL CALC-MCNC: 19.2 MG/DL
WBC # BLD AUTO: 4.9 K/UL (ref 3.6–11)

## 2020-12-08 RX ORDER — METHOCARBAMOL 750 MG/1
750 TABLET, FILM COATED ORAL 4 TIMES DAILY
Qty: 60 TAB | Refills: 2 | Status: SHIPPED | OUTPATIENT
Start: 2020-12-08 | End: 2022-01-27 | Stop reason: ALTCHOICE

## 2021-03-24 ENCOUNTER — VIRTUAL VISIT (OUTPATIENT)
Dept: INTERNAL MEDICINE CLINIC | Age: 71
End: 2021-03-24
Payer: MEDICARE

## 2021-03-24 DIAGNOSIS — I10 ESSENTIAL HYPERTENSION: ICD-10-CM

## 2021-03-24 DIAGNOSIS — F32.A MILD DEPRESSION: ICD-10-CM

## 2021-03-24 DIAGNOSIS — N18.30 STAGE 3 CHRONIC KIDNEY DISEASE, UNSPECIFIED WHETHER STAGE 3A OR 3B CKD (HCC): ICD-10-CM

## 2021-03-24 DIAGNOSIS — R09.81 SINUS CONGESTION: Primary | ICD-10-CM

## 2021-03-24 DIAGNOSIS — E78.5 DYSLIPIDEMIA: ICD-10-CM

## 2021-03-24 PROCEDURE — 99214 OFFICE O/P EST MOD 30 MIN: CPT | Performed by: INTERNAL MEDICINE

## 2021-03-24 RX ORDER — CEFUROXIME AXETIL 500 MG/1
500 TABLET ORAL 2 TIMES DAILY
Qty: 20 TAB | Refills: 0 | Status: SHIPPED | OUTPATIENT
Start: 2021-03-24 | End: 2021-04-03

## 2021-03-24 RX ORDER — FLUTICASONE PROPIONATE 50 MCG
2 SPRAY, SUSPENSION (ML) NASAL DAILY
Qty: 1 BOTTLE | Refills: 11 | Status: SHIPPED | OUTPATIENT
Start: 2021-03-24 | End: 2022-03-27

## 2021-03-24 NOTE — PROGRESS NOTES
Morena Emerson is a 70 y.o. female who was seen by synchronous (real-time) audio-video technology on 3/24/2021 for Sinus Pain        Assessment & Plan:   Diagnoses and all orders for this visit:    1. Sinus congestion she has a sinus infection which is recurrent and chronic for her and will place her on Ceftin and Flonase.    2. Dyslipidemia for dyslipidemia we have her on atorvastatin which has resulted in LDL of 98.    3. Essential hypertension blood pressure control is with amlodipine and losartan no titration is needed at this time.    4. Stage 3 chronic kidney disease, unspecified whether stage 3a or 3b CKD chronic kidney disease has been stable we will continue to monitor at each office visit.  5. Mild depression (HCC) currently we have her on trazodone for depression.    Other orders  -     cefUROXime (CEFTIN) 500 mg tablet; Take 1 Tab by mouth two (2) times a day for 10 days.  -     fluticasone propionate (FLONASE) 50 mcg/actuation nasal spray; 2 Sprays by Both Nostrils route daily.            Subjective:   Patient seen today as a telehealth visit for evaluation ongoing care she has a known history of sinusitis, hypertension, stage III chronic kidney disease, dyslipidemia and is seen for evaluation.    She states she has had sinus infection for the past 6 weeks has tried Mucinex and all the over-the-counter home remedies.  She now has of green drainage mixed with blood.  She complains of a sinus headache for the past 2 weeks.  She feels horrible.  She has no energy.  She complains of a sore throat, and is seen for evaluation.    Prior to Admission medications    Medication Sig Start Date End Date Taking? Authorizing Provider   cefUROXime (CEFTIN) 500 mg tablet Take 1 Tab by mouth two (2) times a day for 10 days. 3/24/21 4/3/21 Yes Marky Wolfe MD   fluticasone propionate (FLONASE) 50 mcg/actuation nasal spray 2 Sprays by Both Nostrils route daily. 3/24/21  Yes Marky Wolfe MD  methocarbamoL (ROBAXIN) 750 mg tablet Take 1 Tab by mouth four (4) times daily. 12/8/20  Yes Charli Tang MD   cyclobenzaprine (FLEXERIL) 10 mg tablet Take 1 Tab by mouth three (3) times daily as needed for Muscle Spasm(s). 12/2/20  Yes Charli Tang MD   onabotulinumtoxinA (BOTOX INJECTION) Botox   Yes Provider, Historical   atorvastatin (LIPITOR) 80 mg tablet TAKE 1 TABLET BY MOUTH EVERY DAY 11/10/20  Yes Charli Tang MD   losartan (COZAAR) 100 mg tablet TAKE 1 TABLET BY MOUTH EVERY DAY 10/31/20  Yes Charli Tang MD   traZODone (DESYREL) 100 mg tablet TAKE 1 TABLET BY MOUTH EVERY DAY AT NIGHT 6/3/20  Yes Charli Tnag MD   erenumab-aooe (AIMOVIG AUTOINJECTOR SC) Aimovig Autoinjector   Yes Provider, Historical   SUMAtriptan (IMITREX) 100 mg tablet Take 100 mg by mouth once as needed for Migraine. Yes Provider, Historical   amLODIPine (NORVASC) 5 mg tablet Take 1 Tab by mouth daily. 7/30/19  Yes Charli Tang MD   SALINE NOSE 0.65 % nasal squeeze bottle USE 2 SPRAYS IN EACH NOSTRIL 4 TIMES DAILY 4/23/19  Yes Charli Tang MD   meclizine (ANTIVERT) 25 mg tablet Take 1 Tab by mouth three (3) times daily as needed for Dizziness. 7/17/18  Yes Charli Tang MD   mometasone (NASONEX) 50 mcg/actuation nasal spray USE 2 SPRAYS IN EACH NOSTRIL EVERY DAY 3/6/18  Yes Charli Tang MD   topiramate (TOPAMAX) 200 mg tablet Take  by mouth nightly. Yes Other, MD Eugene   galcanezumab-gnlm (EMGALITY SYRINGE) 120 mg/mL syrg by SubCUTAneous route.     Provider, Historical     Patient Active Problem List   Diagnosis Code    Dyslipidemia E78.5    Headache R51.9    Migraines G43.909    HTN (hypertension) I10    Spinal stenosis M48.00    Prediabetes R73.03    Sinus congestion R09.81    Lower urinary tract infectious disease N39.0    Bereavement Z63.4    Rash R21    Dense breasts R92.2    Mild depression (HCC) F32.0    CKD (chronic kidney disease), stage III N18.30    Abdominal pain R10.9    Encounter for Hemoccult screening Z12.11     Patient Active Problem List    Diagnosis Date Noted    Encounter for Hemoccult screening 10/20/2020    Abdominal pain     Mild depression ([facility] [address] 07/17/2018    CKD (chronic kidney disease), stage III 03/16/2017    Dense breasts     Bereavement     Rash     Migraines     HTN (hypertension)     Spinal stenosis     Prediabetes     Sinus congestion     Lower urinary tract infectious disease     Dyslipidemia     Headache      Current Outpatient Medications   Medication Sig Dispense Refill    cefUROXime (CEFTIN) 500 mg tablet Take 1 Tab by mouth two (2) times a day for 10 days. 20 Tab 0    fluticasone propionate (FLONASE) 50 mcg/actuation nasal spray 2 Sprays by Both Nostrils route daily. 1 Bottle 11    methocarbamoL (ROBAXIN) 750 mg tablet Take 1 Tab by mouth four (4) times daily. 60 Tab 2    cyclobenzaprine (FLEXERIL) 10 mg tablet Take 1 Tab by mouth three (3) times daily as needed for Muscle Spasm(s). 60 Tab 2    onabotulinumtoxinA (BOTOX INJECTION) Botox      atorvastatin (LIPITOR) 80 mg tablet TAKE 1 TABLET BY MOUTH EVERY DAY 90 Tab 3    losartan (COZAAR) 100 mg tablet TAKE 1 TABLET BY MOUTH EVERY DAY 90 Tab 3    traZODone (DESYREL) 100 mg tablet TAKE 1 TABLET BY MOUTH EVERY DAY AT NIGHT 90 Tab 3    erenumab-aooe (AIMOVIG AUTOINJECTOR SC) Aimovig Autoinjector      SUMAtriptan (IMITREX) 100 mg tablet Take 100 mg by mouth once as needed for Migraine.  amLODIPine (NORVASC) 5 mg tablet Take 1 Tab by mouth daily. 30 Tab 11    SALINE NOSE 0.65 % nasal squeeze bottle USE 2 SPRAYS IN EACH NOSTRIL 4 TIMES DAILY 44 mL 11    meclizine (ANTIVERT) 25 mg tablet Take 1 Tab by mouth three (3) times daily as needed for Dizziness.  60 Tab 11    mometasone (NASONEX) 50 mcg/actuation nasal spray USE 2 SPRAYS IN EACH NOSTRIL EVERY DAY 1 Container 11    topiramate (TOPAMAX) 200 mg tablet Take  by mouth nightly.  galcanezumab-gnlm (EMGALITY SYRINGE) 120 mg/mL syrg by SubCUTAneous route.        Allergies   Allergen Reactions    Tamiflu [Oseltamivir Phosphate] Hives     Past Medical History:   Diagnosis Date    Abdominal pain     Bereavement 3/16    father - dementia - age 80    CKD (chronic kidney disease), stage III 03/16/2017    Dense breasts     Depression     Dyslipidemia     Encounter for Hemoccult screening 10/20/2020    fit  neg    Headache     High cholesterol     HTN (hypertension)     Hypertension     Migraines     Prediabetes     Rash     S/P colonoscopy 04/18/2019    renee guzman md - tubular adenoma    Sinus congestion     Spinal stenosis     UTI (lower urinary tract infection)      Past Surgical History:   Procedure Laterality Date    HX BREAST BIOPSY Right 2000    neg; surgical bx    HX COLONOSCOPY      HX GYN      HX HYSTERECTOMY       Family History   Problem Relation Age of Onset    Stroke Mother      Social History     Tobacco Use    Smoking status: Never Smoker    Smokeless tobacco: Never Used   Substance Use Topics    Alcohol use: No         REVIEW OF SYSTEMS as noted below except that noted in subjective:  General: negative for - chills or fever  ENT: negative for - headaches, nasal congestion or tinnitus  Respiratory: negative for - cough, hemoptysis, shortness of breath or wheezing  Cardiovascular : negative for - chest pain, edema, palpitations or shortness of breath  Gastrointestinal: negative for - abdominal pain, blood in stools, heartburn or nausea/vomiting  Genito-Urinary: no dysuria, trouble voiding, or hematuria  Musculoskeletal: negative for - gait disturbance, joint pain, joint stiffness or joint swelling  Neurological: no TIA or stroke symptoms  Hematologic: no bruises, no bleeding, no swollen glands  Integument: no lumps, mole changes, nail changes or rash  Endocrine:no malaise/lethargy or unexpected weight changes        Objective:   No flowsheet data found. [INSTRUCTIONS:  \"[x]\" Indicates a positive item  \"[]\" Indicates a negative item  -- DELETE ALL ITEMS NOT EXAMINED]    Constitutional: [x] Appears well-developed and well-nourished [x] No apparent distress      [] Abnormal -     Mental status: [x] Alert and awake  [x] Oriented to person/place/time [x] Able to follow commands    [] Abnormal -     Eyes:   EOM    [x]  Normal    [] Abnormal -   Sclera  [x]  Normal    [] Abnormal -          Discharge [x]  None visible   [] Abnormal -     HENT: [x] Normocephalic, atraumatic  [] Abnormal -   [x] Mouth/Throat: Mucous membranes are moist    External Ears [x] Normal  [] Abnormal -    Neck: [x] No visualized mass [] Abnormal -     Pulmonary/Chest: [x] Respiratory effort normal   [x] No visualized signs of difficulty breathing or respiratory distress        [] Abnormal -      Musculoskeletal:   [x] Normal gait with no signs of ataxia         [x] Normal range of motion of neck        [] Abnormal -     Neurological:        [x] No Facial Asymmetry (Cranial nerve 7 motor function) (limited exam due to video visit)          [x] No gaze palsy        [] Abnormal -          Skin:        [x] No significant exanthematous lesions or discoloration noted on facial skin         [] Abnormal -            Psychiatric:       [x] Normal Affect [] Abnormal -        [x] No Hallucinations    Other pertinent observable physical exam findings:-        We discussed the expected course, resolution and complications of the diagnosis(es) in detail. Medication risks, benefits, costs, interactions, and alternatives were discussed as indicated. I advised her to contact the office if her condition worsens, changes or fails to improve as anticipated. She expressed understanding with the diagnosis(es) and plan. Carrillo Gorman, was evaluated through a synchronous (real-time) audio-video encounter. The patient (or guardian if applicable) is aware that this is a billable service.  Verbal consent to proceed has been obtained within the past 12 months. The visit was conducted pursuant to the emergency declaration under the [address] authority and the [address] and [employer] Act. Patient identification was verified, and a caregiver was present when appropriate. The patient was located in a state where the provider was credentialed to provide care. Deirdre Mario MD    Please note that portions of this dictation may have been recorded with voice recognition software. Some unanticipated grammatical, syntax, homophones, and other interpretive errors are inadvertently transcribed by the computer software. An attempt at proof reading has been made to minimize errors and omissions. Please disregard these errors. Thank you.

## 2021-03-24 NOTE — PROGRESS NOTES
1. Have you been to the ER, urgent care clinic since your last visit? Hospitalized since your last visit? No    2. Have you seen or consulted any other health care providers outside of the [address] [place] [address] since your last visit? Include any pap smears or colon screening.  No     Wants to discuss sinus issue

## 2021-06-02 RX ORDER — TRAZODONE HYDROCHLORIDE 100 MG/1
TABLET ORAL
Qty: 90 TABLET | Refills: 3 | Status: SHIPPED | OUTPATIENT
Start: 2021-06-02 | End: 2022-05-26

## 2021-09-03 ENCOUNTER — TRANSCRIBE ORDER (OUTPATIENT)
Dept: SCHEDULING | Age: 71
End: 2021-09-03

## 2021-09-03 DIAGNOSIS — Z12.31 VISIT FOR SCREENING MAMMOGRAM: Primary | ICD-10-CM

## 2021-10-19 ENCOUNTER — HOSPITAL ENCOUNTER (OUTPATIENT)
Dept: MAMMOGRAPHY | Age: 71
Discharge: HOME | End: 2021-10-19
Attending: INTERNAL MEDICINE
Payer: MEDICARE

## 2021-10-19 DIAGNOSIS — Z12.31 VISIT FOR SCREENING MAMMOGRAM: ICD-10-CM

## 2021-10-19 PROCEDURE — 77067 SCR MAMMO BI INCL CAD: CPT

## 2021-10-21 RX ORDER — AMLODIPINE BESYLATE 5 MG/1
TABLET ORAL
Qty: 90 TABLET | Refills: 4 | Status: SHIPPED | OUTPATIENT
Start: 2021-10-21 | End: 2022-10-28

## 2021-10-21 RX ORDER — ATORVASTATIN CALCIUM 80 MG/1
TABLET, FILM COATED ORAL
Qty: 90 TABLET | Refills: 3 | Status: SHIPPED | OUTPATIENT
Start: 2021-10-21

## 2021-10-21 RX ORDER — LOSARTAN POTASSIUM 100 MG/1
TABLET ORAL
Qty: 90 TABLET | Refills: 3 | Status: SHIPPED | OUTPATIENT
Start: 2021-10-21 | End: 2022-10-28

## 2021-10-25 ENCOUNTER — OFFICE VISIT (OUTPATIENT)
Dept: INTERNAL MEDICINE CLINIC | Age: 71
End: 2021-10-25
Payer: MEDICARE

## 2021-10-25 VITALS
WEIGHT: 146 LBS | TEMPERATURE: 98.6 F | OXYGEN SATURATION: 99 % | RESPIRATION RATE: 16 BRPM | DIASTOLIC BLOOD PRESSURE: 87 MMHG | BODY MASS INDEX: 24.32 KG/M2 | SYSTOLIC BLOOD PRESSURE: 145 MMHG | HEART RATE: 80 BPM | HEIGHT: 65 IN

## 2021-10-25 DIAGNOSIS — F32.A MILD DEPRESSION: ICD-10-CM

## 2021-10-25 DIAGNOSIS — E78.5 DYSLIPIDEMIA: ICD-10-CM

## 2021-10-25 DIAGNOSIS — R73.02 IGT (IMPAIRED GLUCOSE TOLERANCE): ICD-10-CM

## 2021-10-25 DIAGNOSIS — N18.31 STAGE 3A CHRONIC KIDNEY DISEASE (HCC): Primary | ICD-10-CM

## 2021-10-25 DIAGNOSIS — I10 PRIMARY HYPERTENSION: ICD-10-CM

## 2021-10-25 DIAGNOSIS — M25.512 CHRONIC LEFT SHOULDER PAIN: ICD-10-CM

## 2021-10-25 DIAGNOSIS — G89.29 CHRONIC LEFT SHOULDER PAIN: ICD-10-CM

## 2021-10-25 DIAGNOSIS — Z23 NEEDS FLU SHOT: ICD-10-CM

## 2021-10-25 DIAGNOSIS — N39.0 URINARY TRACT INFECTION WITHOUT HEMATURIA, SITE UNSPECIFIED: ICD-10-CM

## 2021-10-25 PROCEDURE — 1090F PRES/ABSN URINE INCON ASSESS: CPT | Performed by: INTERNAL MEDICINE

## 2021-10-25 PROCEDURE — G8427 DOCREV CUR MEDS BY ELIG CLIN: HCPCS | Performed by: INTERNAL MEDICINE

## 2021-10-25 PROCEDURE — 99215 OFFICE O/P EST HI 40 MIN: CPT | Performed by: INTERNAL MEDICINE

## 2021-10-25 PROCEDURE — 90694 VACC AIIV4 NO PRSRV 0.5ML IM: CPT | Performed by: INTERNAL MEDICINE

## 2021-10-25 PROCEDURE — G9717 DOC PT DX DEP/BP F/U NT REQ: HCPCS | Performed by: INTERNAL MEDICINE

## 2021-10-25 PROCEDURE — 1101F PT FALLS ASSESS-DOCD LE1/YR: CPT | Performed by: INTERNAL MEDICINE

## 2021-10-25 PROCEDURE — G8753 SYS BP > OR = 140: HCPCS | Performed by: INTERNAL MEDICINE

## 2021-10-25 PROCEDURE — G8536 NO DOC ELDER MAL SCRN: HCPCS | Performed by: INTERNAL MEDICINE

## 2021-10-25 PROCEDURE — G0008 ADMIN INFLUENZA VIRUS VAC: HCPCS | Performed by: INTERNAL MEDICINE

## 2021-10-25 PROCEDURE — G8754 DIAS BP LESS 90: HCPCS | Performed by: INTERNAL MEDICINE

## 2021-10-25 PROCEDURE — 3017F COLORECTAL CA SCREEN DOC REV: CPT | Performed by: INTERNAL MEDICINE

## 2021-10-25 PROCEDURE — G9899 SCRN MAM PERF RSLTS DOC: HCPCS | Performed by: INTERNAL MEDICINE

## 2021-10-25 PROCEDURE — G8420 CALC BMI NORM PARAMETERS: HCPCS | Performed by: INTERNAL MEDICINE

## 2021-10-25 PROCEDURE — G8400 PT W/DXA NO RESULTS DOC: HCPCS | Performed by: INTERNAL MEDICINE

## 2021-10-25 PROCEDURE — 73030 X-RAY EXAM OF SHOULDER: CPT | Performed by: INTERNAL MEDICINE

## 2021-10-25 RX ORDER — FLUCONAZOLE 150 MG/1
150 TABLET ORAL DAILY
Qty: 2 TABLET | Refills: 1 | Status: SHIPPED | OUTPATIENT
Start: 2021-10-25 | End: 2021-10-26

## 2021-10-25 NOTE — PROGRESS NOTES
SPORTS MEDICINE AND PRIMARY CARE  Miriam Franklin MD, [address] [address]  Phone:  [phone]  Fax: [phone]       Chief Complaint   Patient presents with    Shoulder Pain     Patient is here for left shoulder pain.  Yeast Infection    Urinary Frequency   . SUBJECTIVE:    Dalton Henao is a 80 y.o. female Patient returns today with a known history of chronic kidney disease, impaired glucose tolerance, dyslipidemia, primary hypertension, and is seen for evaluation. Patient returns today complaining of a three-month history of left shoulder discomfort. She has been dealing with it without any assistance at all. It begins in the trapezius muscle distribution and moves into the shoulder, to the point that she is not able to raise her shoulder above the horizontal, and then it moves into the upper arm. There is no numbness or tingling in the fingers and patient is seen for evaluation. We recall that she is the primary caregiver for her , who has had a stroke and is bed to chair confined and incontinent of bowel and bladder. Current Outpatient Medications   Medication Sig Dispense Refill    fluconazole (DIFLUCAN) 150 mg tablet Take 1 Tablet by mouth daily for 1 day. 2 Tablet 1    atorvastatin (LIPITOR) 80 mg tablet TAKE 1 TABLET BY MOUTH EVERY DAY 90 Tablet 3    losartan (COZAAR) 100 mg tablet TAKE 1 TABLET BY MOUTH EVERY DAY 90 Tablet 3    amLODIPine (NORVASC) 5 mg tablet TAKE 1 TABLET BY MOUTH EVERY DAY 90 Tablet 4    traZODone (DESYREL) 100 mg tablet TAKE 1 TABLET BY MOUTH EVERY DAY AT NIGHT 90 Tablet 3    fluticasone propionate (FLONASE) 50 mcg/actuation nasal spray 2 Sprays by Both Nostrils route daily. 1 Bottle 11    methocarbamoL (ROBAXIN) 750 mg tablet Take 1 Tab by mouth four (4) times daily. 60 Tab 2    cyclobenzaprine (FLEXERIL) 10 mg tablet Take 1 Tab by mouth three (3) times daily as needed for Muscle Spasm(s).  60 Tab 2    onabotulinumtoxinA (BOTOX INJECTION) Botox      erenumab-aooe (AIMOVIG AUTOINJECTOR SC) Aimovig Autoinjector      galcanezumab-gnlm (EMGALITY SYRINGE) 120 mg/mL syrg by SubCUTAneous route.  SUMAtriptan (IMITREX) 100 mg tablet Take 100 mg by mouth once as needed for Migraine.  SALINE NOSE 0.65 % nasal squeeze bottle USE 2 SPRAYS IN EACH NOSTRIL 4 TIMES DAILY 44 mL 11    meclizine (ANTIVERT) 25 mg tablet Take 1 Tab by mouth three (3) times daily as needed for Dizziness. 60 Tab 11    mometasone (NASONEX) 50 mcg/actuation nasal spray USE 2 SPRAYS IN EACH NOSTRIL EVERY DAY 1 Container 11    topiramate (TOPAMAX) 200 mg tablet Take  by mouth nightly.          Past Medical History:   Diagnosis Date    Abdominal pain     Bereavement 3/16    father - dementia - age 80    CKD (chronic kidney disease), stage III 03/16/2017    Dense breasts     Depression     Dyslipidemia     Encounter for Hemoccult screening 10/20/2020    fit  neg    Headache     High cholesterol     HTN (hypertension)     Hypertension     Migraines     Prediabetes     Rash     S/P colonoscopy 04/18/2019    renee guzman md - tubular adenoma    Sinus congestion     Spinal stenosis     UTI (lower urinary tract infection)      Past Surgical History:   Procedure Laterality Date    HX BREAST BIOPSY Right 2000    neg; surgical bx    HX COLONOSCOPY      HX GYN      HX HYSTERECTOMY       Allergies   Allergen Reactions    Tamiflu [Oseltamivir Phosphate] Hives         REVIEW OF SYSTEMS:  General: negative for - chills or fever  ENT: negative for - headaches, nasal congestion or tinnitus  Respiratory: negative for - cough, hemoptysis, shortness of breath or wheezing  Cardiovascular : negative for - chest pain, edema, palpitations or shortness of breath  Gastrointestinal: negative for - abdominal pain, blood in stools, heartburn or nausea/vomiting  Genito-Urinary: no dysuria, trouble voiding, or hematuria  Musculoskeletal: negative for - gait disturbance, joint pain, joint stiffness or joint swelling  Neurological: no TIA or stroke symptoms  Hematologic: no bruises, no bleeding, no swollen glands  Integument: no lumps, mole changes, nail changes or rash  Endocrine: no malaise/lethargy or unexpected weight changes      Social History     Socioeconomic History    Marital status:      Spouse name: Not on file    Number of children: Not on file    Years of education: Not on file    Highest education level: Not on file   Tobacco Use    Smoking status: Never Smoker    Smokeless tobacco: Never Used   Substance and Sexual Activity    Alcohol use: No    Drug use: No    Sexual activity: Not Currently     Partners: Male     Birth control/protection: None   Social History Narrative         Family History: Mother:  76 yrs, Hypertension and diabetes mi,cvaFather: alive 80 yrs, htnDaughter(s): alive 32 yrsSon(s): alive 28 yrs1 son(s) , 1 daughter(s)         Social History: Alcohol Use Patient uses alcohol, Drinks per occasion: 2, Drinks per w [place]: 0. Smoking Status Patient is a never smoker. Marital Status: . Lives .  left suddenly  returned ! 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 Education/School: has highschool diploma, has collegediploma [employer] [facility]. She developed migraines while she was in the service her Religion preference is [employer]. Social Determinants of Health     Financial Resource Strain:     Difficulty of Paying Living Expenses:    Food Insecurity:     Worried About Running Out of Food in the Last Year:     [address] in the Last Year:    Transportation Needs:     Lack of Transportation (Medical):      Lack of Transportation (Non-Medical):    Physical Activity:     Days of Exercise per Week:     Minutes of Exercise per Session:    Stress:     Feeling of Stress :    Social Connections:     Frequency of Communication with Friends and Family:     Frequency of Social Gatherings with Friends and Family:     Attends Jewish Services:     Active Member of Clubs or Organizations:     Attends Club or Organization Meetings:     Marital Status:      Family History   Problem Relation Age of Onset    Stroke Mother        OBJECTIVE:    Visit Vitals  BP (!) 145/87   Pulse 80   Temp 98.6 °F (37 °C)   Resp 16   Ht 5' 5\" (1.651 m)   Wt 146 lb (66.2 kg)   SpO2 99%   BMI 24.30 kg/m²     CONSTITUTIONAL: well , well nourished, appears age appropriate  EYES: perrla, eom intact  ENMT:moist mucous membranes, pharynx clear  NECK: supple. Thyroid normal  RESPIRATORY: Chest: clear bilaterally   CARDIOVASCULAR: Heart: regular rate and rhythm  GASTROINTESTINAL: Abdomen: soft, bowel sounds active  HEMATOLOGIC: no pathological lymph nodes palpated  MUSCULOSKELETAL: Extremities: no edema, pulse 1+   INTEGUMENT: No unusual rashes or suspicious skin lesions noted. Nails appear normal.  NEUROLOGIC: non-focal exam   MENTAL STATUS: alert and oriented, appropriate affect           ASSESSMENT:  1. Stage 3a chronic kidney disease ([address]    2. Mild depression ([address]    3. IGT (impaired glucose tolerance)    4. Primary hypertension    5. Dyslipidemia    6. Urinary tract infection without hematuria, site unspecified    7. Chronic left shoulder pain      Stage 3A chronic kidney disease, for which we will check renal panel today. Depressive illness related to her situation with her . Impaired glucose tolerance will be checked with hemoglobin A1c. Blood pressure elevation is noted. We encourage her to check her blood pressure at home, where I think it is in the 130s/80s. She has dyslipidemia, on a statin. She has symptoms of UTI. We will treat her accordingly. She also has a yeast infection, which we will treat accordingly.     Left shoulder discomfort is related to rotator cuff arthropathy and we will take an x-ray to rule out significant pathology. We will ask for physical therapy and if that fails, MRI will be requested with orthopedic referral.      I have discussed the diagnosis with the patient and the intended plan as seen in the  Orders. The patient understands and agees with the plan. The patient has   received an after visit summary and questions were answered concerning  future plans  Patient labs and/or xrays were reviewed  Past records were reviewed. PLAN:  .  Orders Placed This Encounter    CULTURE, URINE    RENAL FUNCTION PANEL    URINALYSIS W/ RFLX MICROSCOPIC    HEMOGLOBIN A1C WITH EAG    REFERRAL TO PHYSICAL THERAPY    fluconazole (DIFLUCAN) 150 mg tablet       Follow-up and Dispositions    · Return in about 3 months (around 1/25/2022). ATTENTION:   This medical record was transcribed using an electronic medical records system. Although proofread, it may and can contain electronic and spelling errors. Other human spelling and other errors may be present. Corrections may be executed at a later time. Please feel free to contact us for any clarifications as needed.

## 2021-10-25 NOTE — PROGRESS NOTES
Chief Complaint   Patient presents with    Shoulder Pain     Patient is here for left shoulder pain. 1. Have you been to the ER, urgent care clinic since your last visit? Hospitalized since your last visit? No    2. Have you seen or consulted any other health care providers outside of the [address] [place] [address] since your last visit? Include any pap smears or colon screening.  No

## 2021-10-26 LAB
ALBUMIN SERPL-MCNC: 4.1 G/DL (ref 3.5–5)
ANION GAP SERPL CALC-SCNC: 8 MMOL/L (ref 5–15)
APPEARANCE UR: CLEAR
BACTERIA URNS QL MICRO: NEGATIVE /HPF
BILIRUB UR QL: NEGATIVE
BUN SERPL-MCNC: 12 MG/DL (ref 6–20)
BUN/CREAT SERPL: 10 (ref 12–20)
CALCIUM SERPL-MCNC: 9.8 MG/DL (ref 8.5–10.1)
CHLORIDE SERPL-SCNC: 111 MMOL/L (ref 97–108)
CO2 SERPL-SCNC: 23 MMOL/L (ref 21–32)
COLOR UR: ABNORMAL
CREAT SERPL-MCNC: 1.23 MG/DL (ref 0.55–1.02)
EPITH CASTS URNS QL MICRO: ABNORMAL /LPF
EST. AVERAGE GLUCOSE BLD GHB EST-MCNC: 117 MG/DL
GLUCOSE SERPL-MCNC: 97 MG/DL (ref 65–100)
GLUCOSE UR STRIP.AUTO-MCNC: NEGATIVE MG/DL
HBA1C MFR BLD: 5.7 % (ref 4–5.6)
HGB UR QL STRIP: NEGATIVE
HYALINE CASTS URNS QL MICRO: ABNORMAL /LPF (ref 0–5)
KETONES UR QL STRIP.AUTO: NEGATIVE MG/DL
LEUKOCYTE ESTERASE UR QL STRIP.AUTO: ABNORMAL
NITRITE UR QL STRIP.AUTO: NEGATIVE
PH UR STRIP: 7 [PH] (ref 5–8)
PHOSPHATE SERPL-MCNC: 2.6 MG/DL (ref 2.6–4.7)
POTASSIUM SERPL-SCNC: 3.8 MMOL/L (ref 3.5–5.1)
PROT UR STRIP-MCNC: NEGATIVE MG/DL
RBC #/AREA URNS HPF: ABNORMAL /HPF (ref 0–5)
SODIUM SERPL-SCNC: 142 MMOL/L (ref 136–145)
SP GR UR REFRACTOMETRY: 1.01 (ref 1–1.03)
UROBILINOGEN UR QL STRIP.AUTO: 0.2 EU/DL (ref 0.2–1)
WBC URNS QL MICRO: ABNORMAL /HPF (ref 0–4)

## 2021-10-27 LAB
BACTERIA SPEC CULT: ABNORMAL
BACTERIA SPEC CULT: ABNORMAL
CC UR VC: ABNORMAL
SERVICE CMNT-IMP: ABNORMAL

## 2021-11-04 ENCOUNTER — HOSPITAL ENCOUNTER (OUTPATIENT)
Dept: PHYSICAL THERAPY | Age: 71
Discharge: HOME | End: 2021-11-04
Payer: MEDICARE

## 2021-11-04 DIAGNOSIS — M25.512 CHRONIC LEFT SHOULDER PAIN: ICD-10-CM

## 2021-11-04 DIAGNOSIS — G89.29 CHRONIC LEFT SHOULDER PAIN: ICD-10-CM

## 2021-11-04 PROCEDURE — 97161 PT EVAL LOW COMPLEX 20 MIN: CPT | Performed by: PHYSICAL THERAPIST

## 2021-11-04 PROCEDURE — 97110 THERAPEUTIC EXERCISES: CPT | Performed by: PHYSICAL THERAPIST

## 2021-11-04 NOTE — PROGRESS NOTES
Physical Therapy at [facility],   a part of [address] [place] [address], [address] [place] [address], [address]  Phone: [phone]  Fax: [phone]      Plan of Care/Statement of Necessity for Physical Therapy Services  2-15    Patient name: Meet Adame  : 1950  Provider#: [id number]  Referral source: Jayshree Eng, *      Medical/Treatment Diagnosis: Chronic left shoulder pain [M25.512, G89.29]     Prior Hospitalization: see medical history     Comorbidities: migraines, HTN, caregiver for   Prior Level of Function: able to use L arm without significant discomfort  Medications: Verified on Patient Summary List  Start of Care: 22      Onset Date: 2021   The [address] [place] [address] and following information is based on the information from the initial evaluation. Assessment/ key information: Patient reports with acute onset of significant L shoulder pain limiting function. She was very guarded and apprehensive during examination today, so held on certain examination measures. Her shoulder is very stiff and limited with ROM, though was able to demonstrate some RTC strength today. Will require ongoing assessment, but given limited mobility and gradual onset, likely dealing with some rotator cuff arthropathy. Given limited mobility, would be concerned about the possibility of adhesive capsulitis.      Evaluation Complexity History HIGH Complexity :3+ comorbidities / personal factors will impact the outcome/ POC ; Examination HIGH Complexity : 4+ Standardized tests and measures addressing body structure, function, activity limitation and / or participation in recreation  ;Presentation LOW Complexity : Stable, uncomplicated  ;Clinical Decision Making MEDIUM Complexity : FOTO score of 26-74  Overall Complexity Rating: LOW     Problem List: pain affecting function, decrease ROM, decrease strength, decrease ADL/ functional abilitiies, decrease activity tolerance and decrease flexibility/ joint mobility   Treatment Plan may include any combination of the following: Therapeutic exercise, Therapeutic activities, Neuromuscular re-education, Physical agent/modality, Manual therapy, Patient education and Self Care training  Patient / Family readiness to learn indicated by: asking questions and interest  Persons(s) to be included in education: patient (P)  Barriers to Learning/Limitations: None  Patient Goal (s): be able to move it without pain  Patient Self Reported Health Status: good  Rehabilitation Potential: good    Short Term Goals: To be accomplished in 2-4 treatments:   1. Pt will be able to reach to at least the level of 90deg for improved mobility   2. Pt will be compliant with initial PT attendance and HEP use  Long Term Goals: To be accomplished in 10-12 treatments:   1. Pt will be able to reach overhead to 120deg without significant shoulder pain   2. Pt will be able to put on seatbelt with L arm without increase in pain   3. Pt will be to use L arm during ADLs without increase in symptoms   4. Pt will be able to carry light weight (5-10lb) in her L hand without increase in symptoms  Frequency / Duration: Patient to be seen 1-2 times per week for 10-12 treatments. Patient/ Caregiver education and instruction: self care and exercises    [x]  Plan of care has been reviewed with PTA    Certification Period: 11/4/21-2/4/22  Sebastian Hogan PT, DPT 11/4/2021     ________________________________________________________________________    I certify that the above Therapy Services are being furnished while the patient is under my care. I agree with the treatment plan and certify that this therapy is necessary.     Physician's Signature:____________________  Date:____________Time: _________         Claire Marcum, *

## 2021-11-04 NOTE — PROGRESS NOTES
PT INITIAL EVALUATION NOTE - [facility] 2-15    Patient Name: Julieta Mattson  Date:2021  : 1950  [x]  Patient  Verified  Payor: Ania Evans / Plan: Clemente Suero / Product Type: Managed Care Medicare /    In time: 9878Z  Out time: 110p  Total Treatment Time (min): 55  Total Timed Codes (min): 10  1:1 Treatment Time ( [address] only): 10   Visit #: 1     Treatment Area: Chronic left shoulder pain [M25.512, G89.29]    SUBJECTIVE  Pain Level (0-10 scale): 10  Any medication changes, allergies to medications, adverse drug reactions, diagnosis change, or new procedure performed?: [] No    [x] Yes (see summary sheet for update)  Subjective:    Patient reports with worsening shoulder pain since 2021. She doesn't knkow of any specific ARYAN, but just slowly started to worsen. She gets Botox injections at the [place] for chronic migraines and was wondering if that was part of the reason why. She feels the pain began in the upper trap region and has extended down into the arm since then. She is a disabled . Patient is R handed. She typically with lies on her R side. She has a spouse at home with a CVA and she is caring for him. He does not require any assistance with transfers. Description of symptoms: lateral shoulder pain  Aggravating Factors: all movements  Alleviating Factors: flexeril, Aleve    Radiation: one episode two days ago with tingling in L arm/hand. Has also had it in the R hand as well. OBJECTIVE/EXAMINATION  Posture: carrying arm in mild sling position, forward positioning  Other Observations: apprehensive and guarded with movements  Functional  and Pinch:  nt  Palpation: TTP over anterior shoulder, bicipital groove, lateral deltoid       (active/passive)  (active/passive)  Shoulder ROM:   R    L   Flexion   135deg   80deg   Abduction  133deg   60deg   IR   T9    QL   ER   T9    To shoulder (no real ER)    Cervical ROM  Flex: 25deg  Ext: 20deg p!   Rotation: L: 30deg   R: 43deg  SB: 22deg, painful to R (on L side)    Joint Mobility Assessment: Glenohumeral: deferred     Flexibility: deferred    UPPER QUARTER   MUSCLE STRENGTH  KEY       R  L  0 - No Contraction   Flexion  --  --  1 - Trace    Extension --  --  2 - Poor    Abduction --  --  3 - Fair     IR  --  4  4 - Good    ER  --  4  5 - Normal    Mid-Trap --  --    Neurological: Reflexes / Sensations: deferred  Special Tests: deferred    Modality rationale: decrease pain and increase tissue extensibility to improve the patients ability to relax muscle tension   Min Type Additional Details    [] Estim: []Att   []Unatt        []TENS instruct                  []IFC  []Premod   []NMES                     []Other:  []w/US   []w/ice   []w/heat  Position:  Location:    []  Traction: [] Cervical       []Lumbar                       [] Prone          []Supine                       []Intermittent   []Continuous Lbs:  [] before manual  [] after manual  []w/heat    []  Ultrasound: []Continuous   [] Pulsed at:                           []1MHz   []3MHz Location:  W/cm2:    [] Paraffin         Location:   []w/heat   10 []  Ice     [x]  Heat  []  Ice massage Position:  Location: L shoulder    []  Laser  []  Other: Position:  Location:      []  Vasopneumatic Device Pressure:       [] lo [] med [] hi   Temperature:      [x] Skin assessment post-treatment:  [x]intact []redness- no adverse reaction    []redness  adverse reaction:     10 min Therapeutic Exercise:  [x] See flow sheet :   Rationale: increase ROM and increase strength to improve the patients ability to reach, use arm without pain          With   [] TE   [] TA   [] Neuro   [] SC   [] other: Patient Education: [x] Review HEP    [] Progressed/Changed HEP based on:   [] positioning   [] body mechanics   [] transfers   [] heat/ice application    [] other:      Other Objective/Functional Measures: FOTO Functional Measure: 51/100    Pain Level (0-10 scale) post treatment: 7    ASSESSMENT/Changes in Function:     [x]  See Plan of Marcel PT, DPT 11/4/2021

## 2021-11-05 NOTE — PATIENT INSTRUCTIONS
Vaccine Information Statement Influenza (Flu) Vaccine (Inactivated or Recombinant): What You Need to Know Many vaccine information statements are available in Georgian and other languages. See www.immunize.org/vis. Hojas de información sobre vacunas están disponibles en español y en muchos otros idiomas. Visite www.immunize.org/vis. 1. Why get vaccinated? Influenza vaccine can prevent influenza (flu). Flu is a contagious disease that spreads around the [facility] every year, usually between October and May. Anyone can get the flu, but it is more dangerous for some people. Infants and young children, people 72 years and older, pregnant people, and people with certain health conditions or a weakened immune system are at greatest risk of flu complications. Pneumonia, bronchitis, sinus infections, and ear infections are examples of flu-related complications. If you have a medical condition, such as heart disease, cancer, or diabetes, flu can make it worse. Flu can cause fever and chills, sore throat, muscle aches, fatigue, cough, headache, and runny or stuffy nose. Some people may have vomiting and diarrhea, though this is more common in children than adults. In an average year, thousands of people in the [facility] die from flu, and many more are hospitalized. Flu vaccine prevents millions of illnesses and flu-related visits to the doctor each year. 2. Influenza vaccines CDC recommends everyone 6 months and older get vaccinated every flu season. Children 6 months through 6years of age may need 2 doses during a single flu season. Everyone else needs only 1 dose each flu season. It takes about 2 weeks for protection to develop after vaccination. There are many flu viruses, and they are always changing. Each year a new flu vaccine is made to protect against the influenza viruses believed to be likely to cause disease in the upcoming flu season.  Even when the vaccine doesnt exactly match these viruses, it may still provide some protection. Influenza vaccine does not cause flu. Influenza vaccine may be given at the same time as other vaccines. 3. Talk with your health care provider Tell your vaccination provider if the person getting the vaccine: 
 Has had an allergic reaction after a previous dose of influenza vaccine, or has any severe, life-threatening allergies  Has ever had Guillain-Barré Syndrome (also called GBS) In some cases, your health care provider may decide to postpone influenza vaccination until a future visit. Influenza vaccine can be administered at any time during pregnancy. People who are or will be pregnant during influenza season should receive inactivated influenza vaccine. People with minor illnesses, such as a cold, may be vaccinated. People who are moderately or severely ill should usually wait until they recover before getting influenza vaccine. Your health care provider can give you more information. 4. Risks of a vaccine reaction  Soreness, redness, and swelling where the shot is given, fever, muscle aches, and headache can happen after influenza vaccination.  There may be a very small increased risk of Guillain-Barré Syndrome (GBS) after inactivated influenza vaccine (the flu shot). [place] children who get the flu shot along with pneumococcal vaccine (PCV13) and/or DTaP vaccine at the same time might be slightly more likely to have a seizure caused by fever. Tell your health care provider if a child who is getting flu vaccine has ever had a seizure. People sometimes faint after medical procedures, including vaccination. Tell your provider if you feel dizzy or have vision changes or ringing in the ears. As with any medicine, there is a very remote chance of a vaccine causing a severe allergic reaction, other serious injury, or death. 5. What if there is a serious problem?  
 
An allergic reaction could occur after the vaccinated person leaves the clinic. If you see signs of a severe allergic reaction (hives, swelling of the face and throat, difficulty breathing, a fast heartbeat, dizziness, or weakness), call 9-1-1 and get the person to the nearest hospital. 
 
For other signs that concern you, call your health care provider. Adverse reactions should be reported to the Vaccine Adverse Event Reporting System (VAERS). Your health care provider will usually file this report, or you can do it yourself. Visit the VAERS website at www.vaers. [facility].gov or call 4-160.967.5566. VAERS is only for reporting reactions, and VAERS staff members do not give medical advice. 6. The National Vaccine Injury Compensation Program 
 
The [facility] Vaccine Injury Compensation Program (VICP) is a federal program that was created to compensate people who may have been injured by certain vaccines. Claims regarding alleged injury or death due to vaccination have a time limit for filing, which may be as short as two years. Visit the VICP website at www.[facility].gov/vaccinecompensation or call 5-759.198.3267 to learn about the program and about filing a claim. 7. How can I learn more?  Ask your health care provider.  Call your local or state health department.  Visit the website of the Food and Drug Administration (FDA) for vaccine package inserts and additional information at www.fda.gov/vaccines-blood-biologics/vaccines.  Contact the Centers for Disease Control and Prevention (CDC): 
- Call 8-157.358.4326 (1-800-CDC-INFO) or 
- Visit CDCs influenza website at www.cdc.gov/flu. Vaccine Information Statement Inactivated Influenza Vaccine 8/6/2021 
[address] [id number]-48 Department of Health and Compario Centers for Disease Control and Prevention Office Use Only

## 2021-11-09 RX ORDER — POLYMYXIN B SULFATE AND TRIMETHOPRIM 1; 10000 MG/ML; [USP'U]/ML
1 SOLUTION OPHTHALMIC EVERY 4 HOURS
Qty: 10 ML | Refills: 0 | Status: SHIPPED | OUTPATIENT
Start: 2021-11-09 | End: 2022-03-11

## 2021-11-11 ENCOUNTER — HOSPITAL ENCOUNTER (OUTPATIENT)
Dept: PHYSICAL THERAPY | Age: 71
Discharge: HOME | End: 2021-11-11
Payer: MEDICARE

## 2021-11-11 PROCEDURE — 97110 THERAPEUTIC EXERCISES: CPT | Performed by: PHYSICAL THERAPIST

## 2021-11-11 NOTE — PROGRESS NOTES
PT DAILY TREATMENT NOTE - [facility] 2-15    Patient Name: Kiley Burrell  Date:2021  : 1950  [x]  Patient  Verified  Payor: Robledo Binning / Plan: Prema Hong / Product Type: Managed Care Medicare /    In time:335p  Out time: 410p  Total Treatment Time (min): 35  Total Timed Codes (min): 25  1:1 Treatment Time ( only): 25   Visit #:  2    Treatment Area: Chronic left shoulder pain [M25.512, G89.29]    SUBJECTIVE  Pain Level (0-10 scale): 9/10  Any medication changes, allergies to medications, adverse drug reactions, diagnosis change, or new procedure performed?: [x] No    [] Yes (see summary sheet for update)  Subjective functional status/changes:   [] No changes reported  Pt reports that exercises are going well except table slides into flexion are painful. OBJECTIVE    Modality rationale: decrease inflammation, decrease pain and increase tissue extensibility to improve the patients ability to reduce pain with shoulder movement.     Min Type Additional Details       [] Estim: []Att   []Unatt    []TENS instruct                  []IFC  []Premod   []NMES                     []Other:  []w/US   []w/ice   []w/heat  Position:  Location:       []  Traction: [] Cervical       []Lumbar                       [] Prone          []Supine                       []Intermittent   []Continuous Lbs:  [] before manual  [] after manual  []w/heat    []  Ultrasound: []Continuous   [] Pulsed                       at: []1MHz   []3MHz Location:  W/cm2:    [] Paraffin         Location:   []w/heat   10 []  Ice     [x]  Heat  []  Ice massage Position: reclined  Location: L shoulder    []  Laser  []  Other: Position:  Location:      []  Vasopneumatic Device Pressure:       [] lo [] med [] hi   Temperature:      [x] Skin assessment post-treatment:  [x]intact []redness- no adverse reaction    []redness  adverse reaction:     25 min Therapeutic Exercise:  [x] See flow sheet :   Rationale: increase ROM, increase strength and increase proprioception to improve the patients ability to use LUE for activities with reduced pain. With   [] TE   [] TA   [] Neuro   [] SC   [] other: Patient Education: [x] Review HEP    [] Progressed/Changed HEP based on:   [] positioning   [] body mechanics   [] transfers   [] heat/ice application    [] other:      Other Objective/Functional Measures: Active shoulder elevation to 80 degrees noting pain in axilla     Pain Level (0-10 scale) post treatment: 7/10    ASSESSMENT/Changes in Function:   Pt was apprehensive throughout treatment to use LUE. Pt noted lateral L arm pain with active ER and instructed to stop once pain begins. Pt education regarding importance of using LUE to perform simple tasks. Patient will continue to benefit from skilled PT services to modify and progress therapeutic interventions, address functional mobility deficits, address ROM deficits, address strength deficits, analyze and address soft tissue restrictions, analyze and cue movement patterns, analyze and modify body mechanics/ergonomics and assess and modify postural abnormalities to attain remaining goals. [x]  See Plan of Care  []  See progress note/recertification  []  See Discharge Summary         Progress towards goals / Updated goals:    Short Term Goals: To be accomplished in 2-4 treatments:              1. Pt will be able to reach to at least the level of 90deg for improved mobility              2. Pt will be compliant with initial PT attendance and HEP use  Long Term Goals:  To be accomplished in 10-12 treatments:              1. Pt will be able to reach overhead to 120deg without significant shoulder pain              2. Pt will be able to put on seatbelt with L arm without increase in pain              3. Pt will be to use L arm during ADLs without increase in symptoms              4. Pt will be able to carry light weight (5-10lb) in her L hand without increase in symptoms  Frequency / Duration: Patient to be seen 1-2 times per week for 10-12 treatments.     PLAN  []  Upgrade activities as tolerated     [x]  Continue plan of care  []  Update interventions per flow sheet       []  Discharge due to:_  []  Other:_      Tami Naranjo, PT 11/11/2021

## 2021-11-18 ENCOUNTER — HOSPITAL ENCOUNTER (OUTPATIENT)
Dept: PHYSICAL THERAPY | Age: 71
Discharge: HOME | End: 2021-11-18
Payer: MEDICARE

## 2021-11-18 PROCEDURE — 97014 ELECTRIC STIMULATION THERAPY: CPT | Performed by: PHYSICAL THERAPIST

## 2021-11-18 PROCEDURE — 97110 THERAPEUTIC EXERCISES: CPT | Performed by: PHYSICAL THERAPIST

## 2021-11-18 PROCEDURE — 97140 MANUAL THERAPY 1/> REGIONS: CPT | Performed by: PHYSICAL THERAPIST

## 2021-11-18 NOTE — PROGRESS NOTES
PT DAILY TREATMENT NOTE - [facility] 2-15    Patient Name: Jayla Party  Date:2021  : 1950  [x]  Patient  Verified  Payor: Lalo Rogers / Plan: Karen Hamilton / Product Type: Managed Care Medicare /    In time:1215p  Out time: 110p  Total Treatment Time (min): 55  Total Timed Codes (min): 45  1:1 Treatment Time ( [address] only): 39   Visit #: 3    Treatment Area: Chronic left shoulder pain [M25.512, G89.29]    SUBJECTIVE  Pain Level (0-10 scale): 8  Any medication changes, allergies to medications, adverse drug reactions, diagnosis change, or new procedure performed?: [x] No    [] Yes (see summary sheet for update)  Subjective functional status/changes:   [] No changes reported  Shoulder is doing a little better overall. Still a lot of pain but is moving it a little bit more. OBJECTIVE    Modality rationale: decrease inflammation, decrease pain and increase tissue extensibility to improve the patients ability to reduce pain with shoulder movement. Min Type Additional Details      10  [x]? Estim: []? Att   [x]? Unatt    []? TENS instruct                  [x]?IFC  []? Premod   []? NMES                     []?Other:  []?w/US   []?w/ice   [x]?w/heat  Position:  Location: L shoulder        []? Traction: []? Cervical       []? Lumbar                       []? Prone          []? Supine                       []?Intermittent   []? Continuous Lbs:  []? before manual  []? after manual  []?w/heat     []? Ultrasound: []? Continuous   []? Pulsed                       at: []?1MHz   []? 3MHz Location:  W/cm2:     []? Paraffin         Location:   []?w/heat    []? Ice     []? Heat  []? Ice massage Position:   Location:     []? Laser  []? Other: Position:  Location:        []? Vasopneumatic Device Pressure:       []? lo []? med []? hi   Temperature:       [x]? Skin assessment post-treatment:  [x]? intact []? redness- no adverse reaction    []? redness  adverse reaction:      47 min Therapeutic Exercise: [x]? See flow sheet : scapular assist with flexion (not helpful to improve ROM)   Rationale: increase ROM, increase strength and increase proprioception to improve the patients ability to use LUE for activities with reduced pain. 8 min Manual Therapy: sidelying scapular mobilizations (D2 flexion/ext pattern)    Rationale: decrease pain, increase ROM, increase tissue extensibility and increase postural awareness to improve the patients ability to reach up without pain                     With   []? TE   []? TA   []? Neuro   []? SC   []? other: Patient Education: [x]? Review HEP    []? Progressed/Changed HEP based on:   []? positioning   []? body mechanics   []? transfers   []? heat/ice application    []? other:       Other Objective/Functional Measures: AROM shoulder: 115deg after exercises      Pain Level (0-10 scale) post treatment: 5/10     ASSESSMENT/Changes in Function:   Doing well with exercises today. Noted improved shoulder mobility and function. Apprehension levels are improving and we were able to progress into more exercise and manual treatment today. Patient will continue to benefit from skilled PT services to modify and progress therapeutic interventions, address functional mobility deficits, address ROM deficits, address strength deficits, analyze and address soft tissue restrictions, analyze and cue movement patterns, analyze and modify body mechanics/ergonomics and assess and modify postural abnormalities to attain remaining goals. [x]? See Plan of Care  []? See progress note/recertification  []? See Discharge Summary         Progress towards goals / Updated goals:     Short Term Goals: To be accomplished in 2-4 treatments:              9.  Pt will be able to reach to at least the level of 90deg for improved mobility MET              2. Pt will be compliant with initial PT attendance and HEP use  Long Term Goals: To be accomplished in 10-12 treatments:              1.  Pt will be able to reach overhead to 120deg without significant shoulder pain PROGRESSING              2. Pt will be able to put on seatbelt with L arm without increase in pain              3. Pt will be to use L arm during ADLs without increase in symptoms              4. Pt will be able to carry light weight (5-10lb) in her L hand without increase in symptoms        PLAN  [x]  Upgrade activities as tolerated     [x]  Continue plan of care  []  Update interventions per flow sheet       []  Discharge due to:_  []  Other:_      Frank Candelario, PT, DPT 11/18/2021

## 2021-12-02 ENCOUNTER — HOSPITAL ENCOUNTER (OUTPATIENT)
Dept: PHYSICAL THERAPY | Age: 71
Discharge: HOME | End: 2021-12-02
Payer: MEDICARE

## 2021-12-02 PROCEDURE — 97140 MANUAL THERAPY 1/> REGIONS: CPT | Performed by: PHYSICAL THERAPIST

## 2021-12-02 PROCEDURE — 97014 ELECTRIC STIMULATION THERAPY: CPT | Performed by: PHYSICAL THERAPIST

## 2021-12-02 PROCEDURE — 97110 THERAPEUTIC EXERCISES: CPT | Performed by: PHYSICAL THERAPIST

## 2021-12-02 NOTE — PROGRESS NOTES
PT DAILY TREATMENT NOTE - [facility] 2-15    Patient Name: Bryson Wick  Date:2021  : 1950  [x]  Patient  Verified  Payor: Marci Rain / Plan: [employer] / Product Type: Managed Care Medicare /    In time:248p  Out time: 343p  Total Treatment Time (min): 55  Total Timed Codes (min): 40  1:1 Treatment Time ( only): 40   Visit #:  4    Treatment Area: Chronic left shoulder pain [M25.512, G89.29]    SUBJECTIVE  Pain Level (0-10 scale): 7  Any medication changes, allergies to medications, adverse drug reactions, diagnosis change, or new procedure performed?: [x] No    [] Yes (see summary sheet for update)  Subjective functional status/changes:   [] No changes reported  Shoulder was more painful after her trip to [facility] as she was       OBJECTIVE             Modality rationale: decrease inflammation, decrease pain and increase tissue extensibility to improve the patients ability to reduce pain with shoulder movement.      Min Type Additional Details      15  [x]? ? Estim: []??Att   [x]? ? Unatt    []? ?TENS instruct                  [x]? ?IFC  []? ?Premod   []? ?NMES                     []? ? Other:  []??w/US   []? ?w/ice   [x]? ?w/heat  Position:  Location: L shoulder        []? ?  Traction: []?? Cervical       []? ?Lumbar                       []? ? Prone          []? ?Supine                       []? ?Intermittent   []? ? Continuous Lbs:  []?? before manual  []? ? after manual  []? ?w/heat     []? ?  Ultrasound: []??Continuous   []? ? Pulsed                       UB: []??1MHz   []? ? 3MHz Location:  W/cm2:     []? ? Paraffin         Location:   []??w/heat     []? ?  Ice     []? ?  Heat  []? ?  Ice massage Position:   Location:     []? ?  Laser  []? ?  Other: Position:  Location:        []? ?  Vasopneumatic Device Pressure:       []? ? lo []? ? med []?? hi   Temperature:       [x]? ? Skin assessment post-treatment:  [x]? ? intact []? ?redness- no adverse reaction    []? ?redness  adverse reaction:      32 min Therapeutic Exercise:  [x]? ? See flow sheet : scapular assist with flexion (not helpful to improve ROM)   Rationale: increase ROM, increase strength and increase proprioception to improve the patients ability to use LUE for activities with reduced pain.                                                8 min Manual Therapy: sidelying scapular mobilizations (D2 flexion/ext pattern) , general STM around [address] joint   Rationale: decrease pain, increase ROM, increase tissue extensibility and increase postural awareness to improve the patients ability to reach up without pain                     With   []?? TE   []?? TA   []? ? Neuro   []?? SC   []?? other: Patient Education: [x]? ? Review HEP    []? ? Progressed/Changed HEP based on:   []?? positioning   []? ? body mechanics   []? ? transfers   []? ? heat/ice application    []? ? other:       Other Objective/Functional Measures: --      Pain Level (0-10 scale) post treatment: 0/10     ASSESSMENT/Changes in Function:   Continuing to improve with diminishing guarding response, though is still very high. Advised on performing her home stretches more diligently at home. Patient will continue to benefit from skilled PT services to modify and progress therapeutic interventions, address functional mobility deficits, address ROM deficits, address strength deficits, analyze and address soft tissue restrictions, analyze and cue movement patterns, analyze and modify body mechanics/ergonomics and assess and modify postural abnormalities to attain remaining goals.     [x]? ?  See Plan of Care  []? ?  See progress note/recertification  []? ?  See Discharge Summary         Progress towards goals / Updated goals:     Short Term Goals: To be accomplished in 2-4 treatments:              3.  Pt will be able to reach to at least the level of 90deg for improved mobility MET              2. Pt will be compliant with initial PT attendance and HEP use  Long Term Goals: To be accomplished in 10-12 treatments:              1. Pt will be able to reach overhead to 120deg without significant shoulder pain PROGRESSING              2. Pt will be able to put on seatbelt with L arm without increase in pain              3. Pt will be to use L arm during ADLs without increase in symptoms              4. Pt will be able to carry light weight (5-10lb) in her L hand without increase in symptoms           PLAN  [x]? Upgrade activities as tolerated     [x]? Continue plan of care  []? Update interventions per flow sheet       []? Discharge due to:_  []?   Other:_          Frank Candelario, PT, DPT 12/2/2021

## 2021-12-16 ENCOUNTER — HOSPITAL ENCOUNTER (OUTPATIENT)
Dept: PHYSICAL THERAPY | Age: 71
Discharge: HOME | End: 2021-12-16
Payer: MEDICARE

## 2021-12-16 PROCEDURE — 97140 MANUAL THERAPY 1/> REGIONS: CPT | Performed by: PHYSICAL THERAPIST

## 2021-12-16 PROCEDURE — 97014 ELECTRIC STIMULATION THERAPY: CPT | Performed by: PHYSICAL THERAPIST

## 2021-12-16 PROCEDURE — 97110 THERAPEUTIC EXERCISES: CPT | Performed by: PHYSICAL THERAPIST

## 2021-12-16 NOTE — PROGRESS NOTES
PT DAILY TREATMENT NOTE - [facility] 2-15    Patient Name: Juan Diego Rapp  Date:2021  : 1950  [x]  Patient  Verified  Payor: Isidoro Chaves / Plan: Shyp Divers / Product Type: Managed Care Medicare /    In time: 458V  Out time: 338p  Total Treatment Time (min): 53   Total Timed Codes (min): 38  1:1 Treatment Time ( only): 38   Visit #:  5    Treatment Area: Chronic left shoulder pain [M25.512, G89.29]    SUBJECTIVE  Pain Level (0-10 scale): 0  Any medication changes, allergies to medications, adverse drug reactions, diagnosis change, or new procedure performed?: [x] No    [] Yes (see summary sheet for update)  Subjective functional status/changes:   [] No changes reported  Pain worsened a few days ago, but is doing a little bit better today. Says that she may have overdone it with wrapping kimberly presents. OBJECTIVE              Modality rationale: decrease inflammation, decrease pain and increase tissue extensibility to improve the patients ability to reduce pain with shoulder movement.      Min Type Additional Details      15  [x]? ?? Estim: []? ? ? Att   [x]? ? ? Unatt    []? ??TENS instruct                  [x]? ?? IFC  []? ? ?Premod   []? ??NMES                     []? ??Other:  []???w/US   []? ??w/ice   [x]? ??w/heat  Position:  Location: L shoulder        []? ??  Traction: []??? Cervical       []? ? ?Lumbar                       []? ?? Prone          []? ? ?Supine                       []? ? ? Intermittent   []? ?? Continuous Lbs:  []??? before manual  []? ?? after manual  []? ??w/heat     []? ??  Ultrasound: []? ? ? Continuous   []? ?? Pulsed                       at: []???1MHz   []? ??3MHz Location:  W/cm2:     []??? Paraffin         Location:   []???w/heat     []? ??  Ice     []? ??  Heat  []? ??  Ice massage Position:   Location:     []? ??  Laser  []? ??  Other: Position:  Location:        []? ??  Vasopneumatic Device Pressure:       []? ?? lo []? ?? med []? ?? hi   Temperature:       [x]? ?? Skin assessment post-treatment:  [x]? ??intact []? ??redness- no adverse reaction    []? ??redness  adverse reaction:      30 min Therapeutic Exercise:  [x]? ?? See flow sheet : AAROM w/ PT for flexion, PROM ER/IR   Rationale: increase ROM, increase strength and increase proprioception to improve the patients ability to use LUE for activities with reduced pain.                                                8 min Manual Therapy: GI-II GH mobilizations, general STM around [address] joint   Rationale: decrease pain, increase ROM, increase tissue extensibility and increase postural awareness to improve the patients ability to reach up without pain                     With   []??? TE   []??? TA   []??? Neuro   []??? SC   []? ?? other: Patient Education: [x]??? Review HEP    []? ?? Progressed/Changed HEP based on:   []??? positioning   []? ?? body mechanics   []? ?? transfers   []? ?? heat/ice application    []? ?? other:       Other Objective/Functional Measures: AROM flexion: 104deg  AAROM flexion: ~140deg      Pain Level (0-10 scale) post treatment: 0/10     ASSESSMENT/Changes in Function:   Doing better with activity tolerance, and AAROM shoulder flexion and AROM are noticeably better. Patient will continue to benefit from skilled PT services to modify and progress therapeutic interventions, address functional mobility deficits, address ROM deficits, address strength deficits, analyze and address soft tissue restrictions, analyze and cue movement patterns, analyze and modify body mechanics/ergonomics and assess and modify postural abnormalities to attain remaining goals.     [x]? ??  See Plan of Care  []? ??  See progress note/recertification  []? ??  See Discharge Summary         Progress towards goals / Updated goals:     Short Term Goals: To be accomplished in 2-4 treatments:              8.  Pt will be able to reach to at least the level of 90deg for improved mobility MET              2. Pt will be compliant with initial PT attendance and HEP use  Long Term Goals: To be accomplished in 10-12 treatments:              1. Pt will be able to reach overhead to 120deg without significant shoulder pain PROGRESSING              2. Pt will be able to put on seatbelt with L arm without increase in pain              3. Pt will be to use L arm during ADLs without increase in symptoms              4. Pt will be able to carry light weight (5-10lb) in her L hand without increase in symptoms           PLAN  [x]? ?  Upgrade activities as tolerated     [x]? ?  Continue plan of care  []? ?  Update interventions per flow sheet       []? ?  Discharge due to:_  []??  Other:_           Sebastian Hogan, PT, DPT 12/16/2021

## 2021-12-30 ENCOUNTER — HOSPITAL ENCOUNTER (OUTPATIENT)
Dept: PHYSICAL THERAPY | Age: 71
Discharge: HOME | End: 2021-12-30
Payer: MEDICARE

## 2021-12-30 PROCEDURE — 97140 MANUAL THERAPY 1/> REGIONS: CPT | Performed by: PHYSICAL THERAPIST

## 2021-12-30 PROCEDURE — 97110 THERAPEUTIC EXERCISES: CPT | Performed by: PHYSICAL THERAPIST

## 2021-12-30 PROCEDURE — 97014 ELECTRIC STIMULATION THERAPY: CPT | Performed by: PHYSICAL THERAPIST

## 2021-12-30 NOTE — PROGRESS NOTES
PT DAILY TREATMENT NOTE - [facility] 2-15    Patient Name: Mitch Perez  Date:2021  : 1950  [x]  Patient  Verified  Payor: Christy Piper / Plan: Jagruti Dotson / Product Type: Managed Care Medicare /    In time: [id number]  Out time: 1205p  Total Treatment Time (min): 65  Total Timed Codes (min): 50  1:1 Treatment Time ( only): 55   Visit #:  6    Treatment Area: Chronic left shoulder pain [M25.512, G89.29]    SUBJECTIVE  Pain Level (0-10 scale): 0  Any medication changes, allergies to medications, adverse drug reactions, diagnosis change, or new procedure performed?: [x] No    [] Yes (see summary sheet for update)  Subjective functional status/changes:   [] No changes reported  Shoulder is feeling pretty good right now. Says that she still is trying not to use it very much. OBJECTIVE              Modality rationale: decrease inflammation, decrease pain and increase tissue extensibility to improve the patients ability to reduce pain with shoulder movement.      Min Type Additional Details      15  [x]? ??? Estim: []?? ?? Att   [x]? ???Unatt    []????TENS instruct                  [x]? ???IFC  []????Premod   []????NMES                     []?? ??Other:  []????w/US   []? ???w/ice   [x]? ???w/heat  Position:  Location: L shoulder        []????  Traction: []???? Cervical       []????Lumbar                       []???? Prone          []????Supine                       []?? ?? Intermittent   []???? Continuous Lbs:  []???? before manual  []???? after manual  []? ???w/heat     []????  Ultrasound: []???? Continuous   []???? Pulsed                       at: []????1MHz   []????3MHz Location:  W/cm2:     []???? Paraffin         Location:   []????w/heat     []????  Ice     []????  Heat  []????  Ice massage Position:   Location:     []????  Laser  []????  Other: Position:  Location:        []????  Vasopneumatic Device Pressure:       []???? lo []???? med []???? hi   Temperature:       [x]? ??? Skin assessment post-treatment:  [x]? ???intact []? ???redness- no adverse reaction    []? ???redness  adverse reaction:      42 min Therapeutic Exercise:  [x]? ??? See flow sheet : GHANSHYAM w/ PT for flexion, PROM ER/IR   Rationale: increase ROM, increase strength and increase proprioception to improve the patients ability to use LUE for activities with reduced pain.                                                8 min Manual Therapy: GI-II GH mobilizations, general STM around [facility] joint   Rationale: decrease pain, increase ROM, increase tissue extensibility and increase postural awareness to improve the patients ability to reach up without pain                     With   []???? TE   []???? TA   []???? Neuro   []???? SC   []???? other: Patient Education: [x]???? Review HEP    []???? Progressed/Changed HEP based on:   []???? positioning   []???? body mechanics   []???? transfers   []???? heat/ice application    []???? other:       Other Objective/Functional Measures:   PROM flexion: 135deg        Pain Level (0-10 scale) post treatment: 0/10     ASSESSMENT/Changes in Function:   Advised on continuing to work more frequently to make lasting ROM gains. She does demonstrate an empty end feel with PROM flexion. May be transferring to [place] for further OP PT in the next month, so will d/c from this facility when she gets approval.    Patient will continue to benefit from skilled PT services to modify and progress therapeutic interventions, address functional mobility deficits, address ROM deficits, address strength deficits, analyze and address soft tissue restrictions, analyze and cue movement patterns, analyze and modify body mechanics/ergonomics and assess and modify postural abnormalities to attain remaining goals.     [x]? ???  See Plan of Care  []????  See progress note/recertification  []????  See Discharge Summary         Progress towards goals / Updated goals:     Short Term Goals: To be accomplished in 2-4 treatments:              4. Pt will be able to reach to at least the level of 90deg for improved mobility MET              2. Pt will be compliant with initial PT attendance and HEP use  Long Term Goals: To be accomplished in 10-12 treatments:              1. Pt will be able to reach overhead to 120deg without significant shoulder pain PROGRESSING              2. Pt will be able to put on seatbelt with L arm without increase in pain              3. Pt will be to use L arm during ADLs without increase in symptoms              4. Pt will be able to carry light weight (5-10lb) in her L hand without increase in symptoms           PLAN  [x]???  Upgrade activities as tolerated     [x]? ??  Continue plan of care  []? ??  Update interventions per flow sheet       []???  Discharge due to:_  []???  Other:_        Jose Ramon Horton, PT, DPT 12/30/2021

## 2022-01-06 ENCOUNTER — HOSPITAL ENCOUNTER (OUTPATIENT)
Dept: PHYSICAL THERAPY | Age: 72
Discharge: HOME | End: 2022-01-06
Payer: MEDICARE

## 2022-01-06 PROCEDURE — 97014 ELECTRIC STIMULATION THERAPY: CPT | Performed by: PHYSICAL THERAPIST

## 2022-01-06 PROCEDURE — 97140 MANUAL THERAPY 1/> REGIONS: CPT | Performed by: PHYSICAL THERAPIST

## 2022-01-06 PROCEDURE — 97110 THERAPEUTIC EXERCISES: CPT | Performed by: PHYSICAL THERAPIST

## 2022-01-06 NOTE — PROGRESS NOTES
Physical Therapy at [facility],   a part of [address] [facility] [place]  [address] [address], [address] [place] [address], [address] [place] [address]  Phone: [phone] Fax: [phone]    Progress Note    Name: Steven Ramirez   : 1950   MD: Terry Fontaine, *       Treatment Diagnosis: Chronic left shoulder pain [M25.512, G89.29]  Start of Care: 21    Visits from Start of Care: 7  Missed Visits: 0    Summary of Care: Ms. Demarco Cortes has made good progress since beginning PT regarding her L shoulder pain. She has improved her shoulder flexion and abduction motions by 30 and 20deg, respectively, and her rotational movements are improved as well. She is demonstrating good AAROM as well, and her activity and resistance tolerance is improving. Shoulder AROM  Flex: 110deg  Abd: 80deg  IR: Sacrum  ER: T2    Short Term Goals: To be accomplished in 2-4 treatments:              1. Pt will be able to reach to at least the level of 90deg for improved mobility MET              2. Pt will be compliant with initial PT attendance and HEP use MET  Long Term Goals: To be accomplished in 10-12 treatments:              1. Pt will be able to reach overhead to 120deg without significant shoulder pain PROGRESSING              2. Pt will be able to put on seatbelt with L arm without increase in pain NOT MET              3. Pt will be to use L arm during ADLs without increase in symptoms PROGRESSING              4. Pt will be able to carry light weight (5-10lb) in her L hand without increase in symptoms PROGRESSING    Assessment / Recommendations:      Other: Pt would continue to benefit from PT for further 4 weeks in order to promote return of shoulder ROM and function        Fatoumata Atwood, PT, DPT 2022

## 2022-01-06 NOTE — PROGRESS NOTES
PT DAILY TREATMENT NOTE - [facility] 2-15    Patient Name: Suzi Wilkerson  Date:2022  : 1950  [x]  Patient  Verified  Payor: Tomi Smith / Plan: Guy Sena / Product Type: Managed Care Medicare /    In time: 117p  Out time: 215p  Total Treatment Time (min): 58  Total Timed Codes (min): 43  1:1 Treatment Time ([facility] only): 37   Visit #:  7    Treatment Area: Chronic left shoulder pain [M25.512, G89.29]    SUBJECTIVE  Pain Level (0-10 scale): 7  Any medication changes, allergies to medications, adverse drug reactions, diagnosis change, or new procedure performed?: [x] No    [] Yes (see summary sheet for update)  Subjective functional status/changes:   [] No changes reported  Shoulder is not too bad today. Not starting PT at the [place] until February. OBJECTIVE                Modality rationale: decrease inflammation, decrease pain and increase tissue extensibility to improve the patients ability to reduce pain with shoulder movement.      Min Type Additional Details      15  [x]????? Estim: []??? ? ? Att   [x]??? ?? Unatt    []?????TENS instruct                  [x]??? ? ?IFC  []??? ? ? Premod   []??? ??NMES                     []??? ?? Other:  []?????w/US   []?????w/ice   [x]?????w/heat  Position:  Location: L shoulder        []?????  Traction: []????? Cervical       []??? ? ?Lumbar                       []????? Prone          []??? ? ? Supine                       []??? ? ? Intermittent   []??? ? ? Continuous Lbs:  []????? before manual  []????? after manual  []?????w/heat     []?????  Ultrasound: []??? ? ? Continuous   []????? Pulsed                       at: []?????1MHz   []?????3MHz Location:  W/cm2:     []????? Paraffin         Location:   []?????w/heat     []?????  Ice     []?????  Heat  []?????  Ice massage Position:   Location:     []?????  Laser  []?????  Other: Position:  Location:        []?????  Vasopneumatic Device Pressure:       []????? lo []????? med []????? hi   Temperature:       [x]????? Skin assessment post-treatment:  [x]? ????intact []?????redness- no adverse reaction    []?????redness  adverse reaction:      35 min Therapeutic Exercise:  [x]? ???? See flow sheet : GHANSHYAM w/ PT for flexion, PROM ER/IR   Rationale: increase ROM, increase strength and increase proprioception to improve the patients ability to use LUE for activities with reduced pain.                                                8 min Manual Therapy: GI-II GH mobilizations, general STM around [address] joint   Rationale: decrease pain, increase ROM, increase tissue extensibility and increase postural awareness to improve the patients ability to reach up without pain                     With   []????? TE   []????? TA   []????? Neuro   []????? SC   []????? other: Patient Education: [x]????? Review HEP    []????? Progressed/Changed HEP based on:   []????? positioning   []????? body mechanics   []????? transfers   []????? heat/ice application    []????? other:       Other Objective/Functional Measures: FOTO: 43     Pain Level (0-10 scale) post treatment: 0/10     ASSESSMENT/Changes in Function:   Patient will continue to benefit from skilled PT services to modify and progress therapeutic interventions, address functional mobility deficits, address ROM deficits, address strength deficits, analyze and address soft tissue restrictions, analyze and cue movement patterns, analyze and modify body mechanics/ergonomics and assess and modify postural abnormalities to attain remaining goals.     [x]?????  See Plan of Care  [x]?????  See progress note/recertification  []?????  See Discharge Summary     PLAN  [x]????  Upgrade activities as tolerated     [x]? ???  Continue plan of care  []????  Update interventions per flow sheet       []????  Discharge due to:_  []????  Other:_            Fatoumata Atwood, PT, DPT 1/6/2022

## 2022-01-07 ENCOUNTER — APPOINTMENT (OUTPATIENT)
Dept: PHYSICAL THERAPY | Age: 72
End: 2022-01-07
Payer: MEDICARE

## 2022-01-20 ENCOUNTER — APPOINTMENT (OUTPATIENT)
Dept: PHYSICAL THERAPY | Age: 72
End: 2022-01-20
Payer: MEDICARE

## 2022-01-27 ENCOUNTER — OFFICE VISIT (OUTPATIENT)
Dept: INTERNAL MEDICINE CLINIC | Age: 72
End: 2022-01-27
Payer: MEDICARE

## 2022-01-27 ENCOUNTER — HOSPITAL ENCOUNTER (OUTPATIENT)
Dept: PHYSICAL THERAPY | Age: 72
Discharge: HOME | End: 2022-01-27
Payer: MEDICARE

## 2022-01-27 VITALS
OXYGEN SATURATION: 98 % | SYSTOLIC BLOOD PRESSURE: 142 MMHG | HEIGHT: 65 IN | RESPIRATION RATE: 18 BRPM | TEMPERATURE: 98.1 F | BODY MASS INDEX: 27.24 KG/M2 | HEART RATE: 78 BPM | WEIGHT: 163.5 LBS | DIASTOLIC BLOOD PRESSURE: 81 MMHG

## 2022-01-27 DIAGNOSIS — G89.29 CHRONIC LEFT SHOULDER PAIN: ICD-10-CM

## 2022-01-27 DIAGNOSIS — Z13.31 SCREENING FOR DEPRESSION: ICD-10-CM

## 2022-01-27 DIAGNOSIS — I10 PRIMARY HYPERTENSION: ICD-10-CM

## 2022-01-27 DIAGNOSIS — M48.07 SPINAL STENOSIS OF LUMBOSACRAL REGION: ICD-10-CM

## 2022-01-27 DIAGNOSIS — Z00.00 MEDICARE ANNUAL WELLNESS VISIT, SUBSEQUENT: Primary | ICD-10-CM

## 2022-01-27 DIAGNOSIS — M25.512 CHRONIC LEFT SHOULDER PAIN: ICD-10-CM

## 2022-01-27 DIAGNOSIS — N18.31 STAGE 3A CHRONIC KIDNEY DISEASE (HCC): ICD-10-CM

## 2022-01-27 DIAGNOSIS — E78.5 DYSLIPIDEMIA: ICD-10-CM

## 2022-01-27 DIAGNOSIS — F32.A MILD DEPRESSION: ICD-10-CM

## 2022-01-27 DIAGNOSIS — R73.02 IGT (IMPAIRED GLUCOSE TOLERANCE): ICD-10-CM

## 2022-01-27 PROCEDURE — 97140 MANUAL THERAPY 1/> REGIONS: CPT | Performed by: PHYSICAL THERAPIST

## 2022-01-27 PROCEDURE — G9717 DOC PT DX DEP/BP F/U NT REQ: HCPCS | Performed by: INTERNAL MEDICINE

## 2022-01-27 PROCEDURE — 97014 ELECTRIC STIMULATION THERAPY: CPT | Performed by: PHYSICAL THERAPIST

## 2022-01-27 PROCEDURE — 99213 OFFICE O/P EST LOW 20 MIN: CPT | Performed by: INTERNAL MEDICINE

## 2022-01-27 PROCEDURE — G8754 DIAS BP LESS 90: HCPCS | Performed by: INTERNAL MEDICINE

## 2022-01-27 PROCEDURE — G0439 PPPS, SUBSEQ VISIT: HCPCS | Performed by: INTERNAL MEDICINE

## 2022-01-27 PROCEDURE — 1090F PRES/ABSN URINE INCON ASSESS: CPT | Performed by: INTERNAL MEDICINE

## 2022-01-27 PROCEDURE — G8427 DOCREV CUR MEDS BY ELIG CLIN: HCPCS | Performed by: INTERNAL MEDICINE

## 2022-01-27 PROCEDURE — 1101F PT FALLS ASSESS-DOCD LE1/YR: CPT | Performed by: INTERNAL MEDICINE

## 2022-01-27 PROCEDURE — G8536 NO DOC ELDER MAL SCRN: HCPCS | Performed by: INTERNAL MEDICINE

## 2022-01-27 PROCEDURE — G8753 SYS BP > OR = 140: HCPCS | Performed by: INTERNAL MEDICINE

## 2022-01-27 PROCEDURE — G8419 CALC BMI OUT NRM PARAM NOF/U: HCPCS | Performed by: INTERNAL MEDICINE

## 2022-01-27 PROCEDURE — 97110 THERAPEUTIC EXERCISES: CPT | Performed by: PHYSICAL THERAPIST

## 2022-01-27 PROCEDURE — G9899 SCRN MAM PERF RSLTS DOC: HCPCS | Performed by: INTERNAL MEDICINE

## 2022-01-27 PROCEDURE — 3017F COLORECTAL CA SCREEN DOC REV: CPT | Performed by: INTERNAL MEDICINE

## 2022-01-27 PROCEDURE — G8400 PT W/DXA NO RESULTS DOC: HCPCS | Performed by: INTERNAL MEDICINE

## 2022-01-27 RX ORDER — CEFUROXIME AXETIL 500 MG/1
500 TABLET ORAL 2 TIMES DAILY
Qty: 20 TABLET | Refills: 0 | Status: SHIPPED | OUTPATIENT
Start: 2022-01-27 | End: 2022-02-06

## 2022-01-27 RX ORDER — PANTOPRAZOLE SODIUM 40 MG/1
40 TABLET, DELAYED RELEASE ORAL DAILY
Qty: 30 TABLET | Refills: 11 | Status: SHIPPED | OUTPATIENT
Start: 2022-01-27

## 2022-01-27 NOTE — PROGRESS NOTES
SPORTS MEDICINE AND PRIMARY CARE  Mook Aguila MD, [address] [address]  Phone:  [phone]  Fax: [phone]      Chief Complaint   Patient presents with    Annual Wellness Visit         SUBECTIVE:    Scotty Chapin is a 80 y.o. female Patient returns today with a known history of mild depressive disorder, CKD stage 3A, dyslipidemia, primary hypertension, lumbar spinal stenosis, impaired glucose tolerance and chronic left shoulder pain. She has been in therapy with Shellye Nailer without improvement. Patient returns today complaining of a sinus infection for two weeks. She has a headache and facial pain, now her teeth are even painful. She has stomach pains for weeks, it hurts particularly if she eats certain foods, coffee and greasy foods. It is a stabbing pain, feels like she is going to throw up. She takes antacid for it. She has also noted ankle swelling for the past week and is seen for evaluation. She usually takes a combination of aspirin, citric acid and sodium bicarbonate generic from [facility] and pain reliever. Current Outpatient Medications   Medication Sig Dispense Refill    pantoprazole (PROTONIX) 40 mg tablet Take 1 Tablet by mouth daily. 30 Tablet 11    cefUROXime (CEFTIN) 500 mg tablet Take 1 Tablet by mouth two (2) times a day for 10 days. 20 Tablet 0    trimethoprim-polymyxin b (POLYTRIM) ophthalmic solution Administer 1 Drop to left eye every four (4) hours. 10 mL 0    atorvastatin (LIPITOR) 80 mg tablet TAKE 1 TABLET BY MOUTH EVERY DAY 90 Tablet 3    losartan (COZAAR) 100 mg tablet TAKE 1 TABLET BY MOUTH EVERY DAY 90 Tablet 3    amLODIPine (NORVASC) 5 mg tablet TAKE 1 TABLET BY MOUTH EVERY DAY 90 Tablet 4    traZODone (DESYREL) 100 mg tablet TAKE 1 TABLET BY MOUTH EVERY DAY AT NIGHT 90 Tablet 3    fluticasone propionate (FLONASE) 50 mcg/actuation nasal spray 2 Sprays by Both Nostrils route daily.  1 Bottle 11    onabotulinumtoxinA (BOTOX INJECTION) Botox      SUMAtriptan (IMITREX) 100 mg tablet Take 100 mg by mouth once as needed for Migraine.  SALINE NOSE 0.65 % nasal squeeze bottle USE 2 SPRAYS IN EACH NOSTRIL 4 TIMES DAILY 44 mL 11    topiramate (TOPAMAX) 200 mg tablet Take  by mouth nightly. Past Medical History:   Diagnosis Date    Abdominal pain     Bereavement 3/16    father - dementia - age 80    CKD (chronic kidney disease), stage III ([address] 2017    Dense breasts     Depression     Dyslipidemia     Encounter for Hemoccult screening 10/20/2020    fit  neg    Headache     High cholesterol     HTN (hypertension)     Hypertension     Migraines     Prediabetes     Rash     S/P colonoscopy 2019    renee guzman md - tubular adenoma    Sinus congestion     Spinal stenosis     UTI (lower urinary tract infection)      Past Surgical History:   Procedure Laterality Date    HX BREAST BIOPSY Right     neg; surgical bx    HX COLONOSCOPY      HX GYN      HX HYSTERECTOMY       Allergies   Allergen Reactions    Tamiflu [Oseltamivir Phosphate] Hives       REVIEW OF SYSTEMS:   She is not having nosebleeds, but when she blows her nose she notes blood on the tissue. Social History     Socioeconomic History    Marital status:    Tobacco Use    Smoking status: Never Smoker    Smokeless tobacco: Never Used   Vaping Use    Vaping Use: Never used   Substance and Sexual Activity    Alcohol use: No    Drug use: No    Sexual activity: Not Currently     Partners: Male     Birth control/protection: None   Social History Narrative         Family History: Mother:  76 yrs, Hypertension and diabetes mi,cvaFather: alive 80 yrs, htnDaughter(s): alive 32 yrsSon(s): alive 28 yrs1 son(s) , 1 daughter(s)         Social History: Alcohol Use Patient uses alcohol, Drinks per occasion: 2, Drinks per w [place]: 0. Smoking Status Patient is a never smoker. Marital Status: . Lives .   left suddenly 2015 returned 2017! 4/2019 acute cva -w/cOccupation/W ork: not employed. Education/School: has highschool diploma, has collegediploma [employer]. She developed migraines while she was in the service her Hinduism preference is [employer].    r  Family History   Problem Relation Age of Onset    Stroke Mother        OBJECTIVE:  Visit Vitals  BP (!) 142/81   Pulse 78   Temp 98.1 °F (36.7 °C) (Oral)   Resp 18   Ht 5' 5\" (1.651 m)   Wt 163 lb 8 oz (74.2 kg)   SpO2 98%   BMI 27.21 kg/m²     ENT: perrla,  eom intact  NECK: supple. Thyroid normal  CHEST: clear to ascultation and percussion   HEART: regular rate and rhythm  ABD: soft, bowel sounds active  EXTREMITIES: no edema, pulse 1+     No visits with results within 3 Month(s) from this visit. Latest known visit with results is:   Office Visit on 10/25/2021   Component Date Value Ref Range Status    Special Requests: 10/25/2021 NO SPECIAL REQUESTS    Final    [place] Count 10/25/2021     Final                    Value:[id number]  COLONIES/mL      Culture result: 10/25/2021 *   Final                    Value:STREPTOCOCCI, BETA HEMOLYTIC GROUP B  (PREDOMINATING)  Penicillin and ampicillin are drugs of choice for treatment of beta-hemolytic streptococcal infections. Susceptibility testing of penicillins and beta-lactams approved by the FDA for treatment of beta-hemolytic streptococcal infections need not be performed routinely, because nonsusceptible isolates are extremely rare. CLSI 2012  Penicillin and ampicillin are drugs of choice for treatment of beta-hemolytic streptococcal infections. Susceptibility testing of penicillins and beta-lactams approved by the FDA for treatment of beta-hemolytic streptococcal infections need not be performed routinely, because nonsusceptible isolates are extremely rare. CLSI 2012  Penicillin and ampicillin are drugs of choice for treatment of beta-hemolytic streptococcal infections. Susceptibility testing of penicillins and beta-lactams approved by the FDA for treatment of beta-hemolytic streptococcal infections need not be performed routinely, because nons                          usceptible isolates are extremely rare. CLSI 2012  Penicillin and ampicillin are drugs of choice for treatment of beta-hemolytic streptococcal infections. Susceptibility testing of penicillins and beta-lactams approved by the FDA for treatment of beta-hemolytic streptococcal infections need not be performed routinely, because nonsusceptible isolates are extremely rare. CLSI 2012  Penicillin and ampicillin are drugs of choice for treatment of beta-hemolytic streptococcal infections. Susceptibility testing of penicillins and beta-lactams approved by the FDA for treatment of beta-hemolytic streptococcal infections need not be performed routinely, because nonsusceptible isolates are extremely rare. CLSI 2012  Penicillin and ampicillin are drugs of choice for treatment of beta-hemolytic streptococcal infections. Susceptibility testing of penicillins and beta-lactams approved by the FDA for treatment of beta-hemolytic streptococcal infections need not be performed routinely, because nonsusce                          ptible isolates are extremely rare. CLSI 2012  Penicillin and ampicillin are drugs of choice for treatment of beta-hemolytic streptococcal infections. Susceptibility testing of penicillins and beta-lactams approved by the FDA for treatment of beta-hemolytic streptococcal infections need not be performed routinely, because nonsusceptible isolates are extremely rare. CLSI 2012  Penicillin and ampicillin are drugs of choice for treatment of beta-hemolytic streptococcal infections. Susceptibility testing of penicillins and beta-lactams approved by the FDA for treatment of beta-hemolytic streptococcal infections need not be performed routinely, because nonsusceptible isolates are extremely rare.  CLSI 2012  Penicillin and ampicillin are drugs of choice for treatment of beta-hemolytic streptococcal infections. Susceptibility testing of penicillins and beta-lactams approved by the FDA for treatment of beta-hemolytic streptococcal infections need not be performed routinely, because nonsusceptib                          le isolates are extremely rare. CLSI 2012      Culture result: 10/25/2021 MIXED UROGENITAL KI ISOLATED    Final    Hemoglobin A1c 10/25/2021 5.7* 4.0 - 5.6 % Final    Comment: NEW METHOD PLEASE NOTE NEW REFERENCE RANGE  (NOTE)  HbA1C Interpretive Ranges  <5.7              Normal  5.7 - 6.4         Consider Prediabetes  >6.5              Consider Diabetes      Est. average glucose 10/25/2021 117  mg/dL Final    Color 10/25/2021 YELLOW/STRAW    Final    Color Reference Range: Straw, Yellow or Dark Yellow    Appearance 10/25/2021 CLEAR  CLEAR   Final    Specific gravity 10/25/2021 1.010  1.003 - 1.030   Final    pH (UA) 10/25/2021 7.0  5.0 - 8.0   Final    Protein 10/25/2021 Negative  Negative mg/dL Final    Glucose 10/25/2021 Negative  Negative mg/dL Final    Ketone 10/25/2021 Negative  Negative mg/dL Final    Bilirubin 10/25/2021 Negative  Negative   Final    Blood 10/25/2021 Negative  Negative   Final    Urobilinogen 10/25/2021 0.2  0.2 - 1.0 EU/dL Final    Nitrites 10/25/2021 Negative  Negative   Final    Leukocyte Esterase 10/25/2021 TRACE* Negative   Final    WBC 10/25/2021 0-4  0 - 4 /hpf Final    RBC 10/25/2021 0-5  0 - 5 /hpf Final    Epithelial cells 10/25/2021 FEW  FEW /lpf Final    Comment: Epithelial cell category consists of squamous cells and /or transitional  urothelial cells. Renal tubular cells, if present, are separately identified as  such.       Bacteria 10/25/2021 Negative  Negative /hpf Final    Hyaline cast 10/25/2021 0-2  0 - 5 /lpf Final    Sodium 10/25/2021 142  136 - 145 mmol/L Final    Potassium 10/25/2021 3.8  3.5 - 5.1 mmol/L Final    Chloride 10/25/2021 111* 97 - 108 mmol/L Final    CO2 10/25/2021 23  21 - 32 mmol/L Final    Anion gap 10/25/2021 8  5 - 15 mmol/L Final    Glucose 10/25/2021 97  65 - 100 mg/dL Final    BUN 10/25/2021 12  6 - 20 MG/DL Final    Creatinine 10/25/2021 1.23* 0.55 - 1.02 MG/DL Final    BUN/Creatinine ratio 10/25/2021 10* 12 - 20   Final    GFR est AA 10/25/2021 52* >60 ml/min/1.73m2 Final    GFR est non-AA 10/25/2021 43* >60 ml/min/1.73m2 Final    Comment: Estimated GFR is calculated using the IDMS-traceable Modification of Diet in  Renal Disease (MDRD) Study equation, reported for both  Americans  (GFRAA) and non- Americans (GFRNA), and normalized to 1.73m2 body  surface area. The physician must decide which value applies to the patient.  Calcium 10/25/2021 9.8  8.5 - 10.1 MG/DL Final    Phosphorus 10/25/2021 2.6  2.6 - 4.7 MG/DL Final    Albumin 10/25/2021 4.1  3.5 - 5.0 g/dL Final          ASSESSMENT:  1. Medicare annual wellness visit, subsequent    2. Screening for depression    3. Stage 3a chronic kidney disease ([address]    4. Mild depression ([facility]    5. Dyslipidemia    6. Primary hypertension    7. Spinal stenosis of lumbosacral region    8. IGT (impaired glucose tolerance)    9. Chronic left shoulder pain      Another episode of sinusitis, which is a chronic recurrent infection in her sinuses, and we will treat her with Ceftin. We are monitoring her CKD stage 3 with appropriate studies. Mild depression. She and her daughter were going to go to the \[facility]\"" and flights were canceled and they went to ______________ (inaudible) for [place] for three days, which was relaxing for her and a needed break from her . Blood pressure control is adequate. Impaired glucose tolerance will be monitored with hemoglobin A1c. The left shoulder discomfort has been followed by PT here, but she can get it free at the [place].   It costs her $40 a shot here, therefore she will start at [facility]. She will be back to see us in four to six months, sooner if needed. I have discussed the diagnosis with the patient and the intended plan as seen in the  orders above. The patient understands and agees with the plan. The patient has   received an after visit summary and questions were answered concerning  future plans  Patient labs and/or xrays were reviewed  Past records were reviewed. PLAN:  .  Orders Placed This Encounter    ANNUAL DEPRESSION SCREEN 8-15 MIN    URINALYSIS W/ RFLX MICROSCOPIC    CBC WITH AUTOMATED DIFF    METABOLIC PANEL, COMPREHENSIVE    LIPID PANEL    HEMOGLOBIN A1C WITH EAG    pantoprazole (PROTONIX) 40 mg tablet    cefUROXime (CEFTIN) 500 mg tablet       Follow-up and Dispositions    · Return in about 4 months (around 5/27/2022). ATTENTION:   This medical record was transcribed using an electronic medical records system. Although proofread, it may and can contain electronic and spelling errors. Other human spelling and other errors may be present. Corrections may be executed at a later time. Please feel free to contact us for any clarifications as needed.

## 2022-01-27 NOTE — PATIENT INSTRUCTIONS

## 2022-01-27 NOTE — PROGRESS NOTES
1. Have you been to the ER, urgent care clinic since your last visit? Hospitalized since your last visit? No    2. Have you seen or consulted any other health care providers outside of the [address] [place] [address] since your last visit? Include any pap smears or colon screening. No    Abdominal pain  This is the Subsequent Medicare Annual Wellness Exam, performed 12 months or more after the Initial AWV or the last Subsequent AWV    I have reviewed the patient's medical history in detail and updated the computerized patient record. Assessment/Plan   Education and counseling provided:  Are appropriate based on today's review and evaluation         Depression Risk Factor Screening     3 most recent PHQ Screens 9/9/2014   Little interest or pleasure in doing things Not at all   Feeling down, depressed, irritable, or hopeless Not at all   Total Score PHQ 2 0       Alcohol & Drug Abuse Risk Screen    Do you average more than 1 drink per night or more than 7 drinks a week:  No    On any one occasion in the past three months have you have had more than 3 drinks containing alcohol:  No          Functional Ability and Level of Safety    Hearing: Hearing is good. Activities of Daily Living: The home contains: no safety equipment. Patient does total self care      Ambulation: with no difficulty     Fall Risk:  Fall Risk Assessment, last 12 mths 1/27/2022   Able to walk? Yes   Fall in past 12 months? 0   Do you feel unsteady?  0   Are you worried about falling 0      Abuse Screen:  Patient is not abused       Cognitive Screening    Has your family/caregiver stated any concerns about your memory: no     Cognitive Screening: not necessary    Health Maintenance Due     Health Maintenance Due   Topic Date Due    Shingrix Vaccine Age 49> (1 of 2) Never done    Pneumococcal 65+ years (1 of 1 - PPSV23) Never done    Lipid Screen  12/02/2021    Medicare Yearly Exam  12/03/2021       Patient Care Team   Patient Care Team:  Rhett Toth MD as PCP - General (Internal Medicine)  Rhett Toth MD as PCP [facility]    History     Patient Active Problem List   Diagnosis Code    Dyslipidemia E78.5    Headache R51.9    Migraines G43.909    HTN (hypertension) I10    Spinal stenosis M48.00    IGT (impaired glucose tolerance) R73.02    Sinus congestion R09.81    Lower urinary tract infectious disease N39.0    Bereavement Z63.4    Rash R21    Dense breasts R92.2    Mild depression ([address] F32.0    CKD (chronic kidney disease), stage III ([address] N18.30    Abdominal pain R10.9    Encounter for Hemoccult screening Z12.11    Chronic left shoulder pain M25.512, G89.29     Past Medical History:   Diagnosis Date    Abdominal pain     Bereavement 3/16    father - dementia - age 80    CKD (chronic kidney disease), stage III ([address] 03/16/2017    Dense breasts     Depression     Dyslipidemia     Encounter for Hemoccult screening 10/20/2020    fit  neg    Headache     High cholesterol     HTN (hypertension)     Hypertension     Migraines     Prediabetes     Rash     S/P colonoscopy 04/18/2019    renee guzman md - tubular adenoma    Sinus congestion     Spinal stenosis     UTI (lower urinary tract infection)       Past Surgical History:   Procedure Laterality Date    HX BREAST BIOPSY Right 2000    neg; surgical bx    HX COLONOSCOPY      HX GYN      HX HYSTERECTOMY       Current Outpatient Medications   Medication Sig Dispense Refill    trimethoprim-polymyxin b (POLYTRIM) ophthalmic solution Administer 1 Drop to left eye every four (4) hours.  10 mL 0    atorvastatin (LIPITOR) 80 mg tablet TAKE 1 TABLET BY MOUTH EVERY DAY 90 Tablet 3    losartan (COZAAR) 100 mg tablet TAKE 1 TABLET BY MOUTH EVERY DAY 90 Tablet 3    amLODIPine (NORVASC) 5 mg tablet TAKE 1 TABLET BY MOUTH EVERY DAY 90 Tablet 4    traZODone (DESYREL) 100 mg tablet TAKE 1 TABLET BY MOUTH EVERY DAY AT NIGHT 90 Tablet 3  fluticasone propionate (FLONASE) 50 mcg/actuation nasal spray 2 Sprays by Both Nostrils route daily. 1 Bottle 11    methocarbamoL (ROBAXIN) 750 mg tablet Take 1 Tab by mouth four (4) times daily. 60 Tab 2    cyclobenzaprine (FLEXERIL) 10 mg tablet Take 1 Tab by mouth three (3) times daily as needed for Muscle Spasm(s). 60 Tab 2    onabotulinumtoxinA (BOTOX INJECTION) Botox      erenumab-aooe (AIMOVIG AUTOINJECTOR SC) Aimovig Autoinjector      galcanezumab-gnlm (EMGALITY SYRINGE) 120 mg/mL syrg by SubCUTAneous route.  SUMAtriptan (IMITREX) 100 mg tablet Take 100 mg by mouth once as needed for Migraine.  SALINE NOSE 0.65 % nasal squeeze bottle USE 2 SPRAYS IN EACH NOSTRIL 4 TIMES DAILY 44 mL 11    meclizine (ANTIVERT) 25 mg tablet Take 1 Tab by mouth three (3) times daily as needed for Dizziness. 60 Tab 11    mometasone (NASONEX) 50 mcg/actuation nasal spray USE 2 SPRAYS IN EACH NOSTRIL EVERY DAY 1 Container 11    topiramate (TOPAMAX) 200 mg tablet Take  by mouth nightly.          Allergies   Allergen Reactions    Tamiflu [Oseltamivir Phosphate] Hives       Family History   Problem Relation Age of Onset    Stroke Mother      Social History     Tobacco Use    Smoking status: Never Smoker    Smokeless tobacco: Never Used   Substance Use Topics    Alcohol use: No         Enrique Shelley LPN

## 2022-01-27 NOTE — PROGRESS NOTES
PT DAILY TREATMENT NOTE - [facility] 2-15    Patient Name: Benjamin Perez  Date:2022  : 1950  [x]  Patient  Verified  Payor: Adriana Santamariao / Plan: Esme Whiteside / Product Type: Managed Care Medicare /    In time: 108p  Out time: 205p  Total Treatment Time (min): 57  Total Timed Codes (min): 42  1:1 Treatment Time ( only): 40   Visit #:  8    Treatment Area: Chronic left shoulder pain [M25.512, G89.29]    SUBJECTIVE  Pain Level (0-10 scale): 9  Any medication changes, allergies to medications, adverse drug reactions, diagnosis change, or new procedure performed?: [x] No    [] Yes (see summary sheet for update)  Subjective functional status/changes:   [] No changes reported  Significant shoulder pain the last few days. OBJECTIVE              Modality rationale: decrease inflammation, decrease pain and increase tissue extensibility to improve the patients ability to reduce pain with shoulder movement.      Min Type Additional Details      15  [x]?????? Estim: []???? ? ? Att   [x]???? ?? Unatt    []??????TENS instruct                  [x]??????IFC  []?????? Premod   []??????NMES                     []?????? Other:  []??????w/US   []??????w/ice   [x]??????w/heat  Position:  Location: L shoulder        []??????  Traction: []?????? Cervical       []??????Lumbar                       []?????? Prone          []?????? Supine                       []???? ? ? Intermittent   []?????? Continuous Lbs:  []?????? before manual  []?????? after manual  []??????w/heat     []??????  Ultrasound: []?????? Continuous   []?????? Pulsed                       at: []??????1MHz   []??????3MHz Location:  W/cm2:     []?????? Paraffin         Location:   []??????w/heat     []??????  Ice     []??????  Heat  []??????  Ice massage Position:   Location:     []??????  Laser  []??????  Other: Position:  Location:        []??????  Vasopneumatic Device Pressure:       []?????? lo []?????? med []?????? hi   Temperature:    [x]?????? Skin assessment post-treatment:  [x]??????intact []??????redness- no adverse reaction    []??????redness - adverse reaction:      36 min Therapeutic Exercise:  [x]?????? See flow sheet : AAROM w/ PT for flexion, PROM ER/IR   Rationale: increase ROM, increase strength and increase proprioception to improve the patients ability to use LUE for activities with reduced pain.                                                 8 min Manual Therapy: GI-II GH mobilizations, general STM around [address] joint   Rationale: decrease pain, increase ROM, increase tissue extensibility and increase postural awareness to improve the patients ability to reach up without pain                     With   []?????? TE   []?????? TA   []?????? Neuro   []?????? SC   []?????? other: Patient Education: [x]?????? Review HEP    []?????? Progressed/Changed HEP based on:   []?????? positioning   []?????? body mechanics   []?????? transfers   []?????? heat/ice application    []?????? other:       Other Objective/Functional Measures: --     Pain Level (0-10 scale) post treatment: 5/10     ASSESSMENT/Changes in Function:   Substantial increase in shoulder pain/guarding today v. Previous few sessions. She will be transitioning to the [place] for further PT care as she has no financial responsibility  Patient will continue to benefit from skilled PT services to modify and progress therapeutic interventions, address functional mobility deficits, address ROM deficits, address strength deficits, analyze and address soft tissue restrictions and analyze and cue movement patterns to attain remaining goals. []  See Plan of Care  []  See progress note/recertification  []  See Discharge Summary         Progress towards goals / Updated goals:    Short Term Goals: To be accomplished in 2-4 treatments:              0.  Pt will be able to reach to at least the level of 90deg for improved mobility MET              2. Pt will be compliant with initial PT attendance and HEP use MET  Long Term Goals: To be accomplished in 10-12 treatments:              1.  Pt will be able to reach overhead to 120deg without significant shoulder pain PROGRESSING              2. Pt will be able to put on seatbelt with L arm without increase in pain NOT MET              3. Pt will be to use L arm during ADLs without increase in symptoms PROGRESSING              4. Pt will be able to carry light weight (5-10lb) in her L hand without increase in symptoms PROGRESSING    PLAN  [x]  Upgrade activities as tolerated     [x]  Continue plan of care  []  Update interventions per flow sheet       []  Discharge due to:_  []  Other:_      Miryam Hernadez, PT, DPT 1/27/2022

## 2022-01-28 LAB
ALBUMIN SERPL-MCNC: 4.7 G/DL (ref 3.5–5)
ALBUMIN/GLOB SERPL: 1.1 {RATIO} (ref 1.1–2.2)
ALP SERPL-CCNC: 63 U/L (ref 45–117)
ALT SERPL-CCNC: 36 U/L (ref 12–78)
ANION GAP SERPL CALC-SCNC: 7 MMOL/L (ref 5–15)
APPEARANCE UR: CLEAR
AST SERPL-CCNC: 23 U/L (ref 15–37)
BACTERIA URNS QL MICRO: NEGATIVE /HPF
BASOPHILS # BLD: 0.1 K/UL (ref 0–0.1)
BASOPHILS NFR BLD: 2 % (ref 0–1)
BILIRUB SERPL-MCNC: 0.4 MG/DL (ref 0.2–1)
BILIRUB UR QL: NEGATIVE
BUN SERPL-MCNC: 12 MG/DL (ref 6–20)
BUN/CREAT SERPL: 9 (ref 12–20)
CALCIUM SERPL-MCNC: 10.5 MG/DL (ref 8.5–10.1)
CHLORIDE SERPL-SCNC: 109 MMOL/L (ref 97–108)
CHOLEST SERPL-MCNC: 196 MG/DL
CO2 SERPL-SCNC: 24 MMOL/L (ref 21–32)
COLOR UR: ABNORMAL
CREAT SERPL-MCNC: 1.32 MG/DL (ref 0.55–1.02)
DIFFERENTIAL METHOD BLD: ABNORMAL
EOSINOPHIL # BLD: 0.2 K/UL (ref 0–0.4)
EOSINOPHIL NFR BLD: 3 % (ref 0–7)
EPITH CASTS URNS QL MICRO: ABNORMAL /LPF
ERYTHROCYTE [DISTWIDTH] IN BLOOD BY AUTOMATED COUNT: 13.6 % (ref 11.5–14.5)
EST. AVERAGE GLUCOSE BLD GHB EST-MCNC: 114 MG/DL
GLOBULIN SER CALC-MCNC: 4.1 G/DL (ref 2–4)
GLUCOSE SERPL-MCNC: 96 MG/DL (ref 65–100)
GLUCOSE UR STRIP.AUTO-MCNC: NEGATIVE MG/DL
HBA1C MFR BLD: 5.6 % (ref 4–5.6)
HCT VFR BLD AUTO: 45.9 % (ref 35–47)
HDLC SERPL-MCNC: 63 MG/DL
HDLC SERPL: 3.1 {RATIO} (ref 0–5)
HGB BLD-MCNC: 14.1 G/DL (ref 11.5–16)
HGB UR QL STRIP: NEGATIVE
HYALINE CASTS URNS QL MICRO: ABNORMAL /LPF (ref 0–5)
IMM GRANULOCYTES # BLD AUTO: 0 K/UL (ref 0–0.04)
IMM GRANULOCYTES NFR BLD AUTO: 1 % (ref 0–0.5)
KETONES UR QL STRIP.AUTO: NEGATIVE MG/DL
LDLC SERPL CALC-MCNC: 103 MG/DL (ref 0–100)
LEUKOCYTE ESTERASE UR QL STRIP.AUTO: ABNORMAL
LYMPHOCYTES # BLD: 1.8 K/UL (ref 0.8–3.5)
LYMPHOCYTES NFR BLD: 32 % (ref 12–49)
MCH RBC QN AUTO: 28.5 PG (ref 26–34)
MCHC RBC AUTO-ENTMCNC: 30.7 G/DL (ref 30–36.5)
MCV RBC AUTO: 92.9 FL (ref 80–99)
MONOCYTES # BLD: 0.4 K/UL (ref 0–1)
MONOCYTES NFR BLD: 6 % (ref 5–13)
NEUTS SEG # BLD: 3.2 K/UL (ref 1.8–8)
NEUTS SEG NFR BLD: 56 % (ref 32–75)
NITRITE UR QL STRIP.AUTO: NEGATIVE
NRBC # BLD: 0 K/UL (ref 0–0.01)
NRBC BLD-RTO: 0 PER 100 WBC
PH UR STRIP: 6.5 [PH] (ref 5–8)
PLATELET # BLD AUTO: 371 K/UL (ref 150–400)
PMV BLD AUTO: 9.7 FL (ref 8.9–12.9)
POTASSIUM SERPL-SCNC: 4 MMOL/L (ref 3.5–5.1)
PROT SERPL-MCNC: 8.8 G/DL (ref 6.4–8.2)
PROT UR STRIP-MCNC: NEGATIVE MG/DL
RBC # BLD AUTO: 4.94 M/UL (ref 3.8–5.2)
RBC #/AREA URNS HPF: ABNORMAL /HPF (ref 0–5)
SODIUM SERPL-SCNC: 140 MMOL/L (ref 136–145)
SP GR UR REFRACTOMETRY: 1 (ref 1–1.03)
TRIGL SERPL-MCNC: 150 MG/DL (ref ?–150)
UROBILINOGEN UR QL STRIP.AUTO: 0.2 EU/DL (ref 0.2–1)
VLDLC SERPL CALC-MCNC: 30 MG/DL
WBC # BLD AUTO: 5.6 K/UL (ref 3.6–11)
WBC URNS QL MICRO: ABNORMAL /HPF (ref 0–4)

## 2022-01-28 NOTE — PROGRESS NOTES
Your laboratory studies are generally stable with the exception of your kidneys. Recall that the kidneys are represented by the BUN, creatinine, and GFR est AA. You have chronic kidney disease stage IIIb.   I remind you to avoid nephrotoxic agents or medications that could hurt your kidneys including ibuprofen, Aleve, Naprosyn, Motrin etc.  When you come back in 3 to 4 months we will repeat the studies and consider the addition of Farxiga to decrease the reply or prevent further decline in your kidney function

## 2022-03-11 ENCOUNTER — OFFICE VISIT (OUTPATIENT)
Dept: INTERNAL MEDICINE CLINIC | Age: 72
End: 2022-03-11
Payer: MEDICARE

## 2022-03-11 VITALS
OXYGEN SATURATION: 94 % | TEMPERATURE: 98 F | WEIGHT: 163.7 LBS | BODY MASS INDEX: 27.27 KG/M2 | DIASTOLIC BLOOD PRESSURE: 80 MMHG | RESPIRATION RATE: 18 BRPM | HEART RATE: 78 BPM | SYSTOLIC BLOOD PRESSURE: 154 MMHG | HEIGHT: 65 IN

## 2022-03-11 DIAGNOSIS — E78.5 DYSLIPIDEMIA: ICD-10-CM

## 2022-03-11 DIAGNOSIS — I10 PRIMARY HYPERTENSION: Primary | ICD-10-CM

## 2022-03-11 DIAGNOSIS — R73.02 IGT (IMPAIRED GLUCOSE TOLERANCE): ICD-10-CM

## 2022-03-11 DIAGNOSIS — N18.31 STAGE 3A CHRONIC KIDNEY DISEASE (HCC): ICD-10-CM

## 2022-03-11 DIAGNOSIS — M48.07 SPINAL STENOSIS OF LUMBOSACRAL REGION: ICD-10-CM

## 2022-03-11 DIAGNOSIS — F32.A MILD DEPRESSION: ICD-10-CM

## 2022-03-11 PROCEDURE — G9717 DOC PT DX DEP/BP F/U NT REQ: HCPCS | Performed by: INTERNAL MEDICINE

## 2022-03-11 PROCEDURE — G8753 SYS BP > OR = 140: HCPCS | Performed by: INTERNAL MEDICINE

## 2022-03-11 PROCEDURE — G9899 SCRN MAM PERF RSLTS DOC: HCPCS | Performed by: INTERNAL MEDICINE

## 2022-03-11 PROCEDURE — 99214 OFFICE O/P EST MOD 30 MIN: CPT | Performed by: INTERNAL MEDICINE

## 2022-03-11 PROCEDURE — G8754 DIAS BP LESS 90: HCPCS | Performed by: INTERNAL MEDICINE

## 2022-03-11 PROCEDURE — G8419 CALC BMI OUT NRM PARAM NOF/U: HCPCS | Performed by: INTERNAL MEDICINE

## 2022-03-11 PROCEDURE — 1101F PT FALLS ASSESS-DOCD LE1/YR: CPT | Performed by: INTERNAL MEDICINE

## 2022-03-11 PROCEDURE — G8400 PT W/DXA NO RESULTS DOC: HCPCS | Performed by: INTERNAL MEDICINE

## 2022-03-11 PROCEDURE — 1090F PRES/ABSN URINE INCON ASSESS: CPT | Performed by: INTERNAL MEDICINE

## 2022-03-11 PROCEDURE — 3017F COLORECTAL CA SCREEN DOC REV: CPT | Performed by: INTERNAL MEDICINE

## 2022-03-11 PROCEDURE — G8427 DOCREV CUR MEDS BY ELIG CLIN: HCPCS | Performed by: INTERNAL MEDICINE

## 2022-03-11 PROCEDURE — G8536 NO DOC ELDER MAL SCRN: HCPCS | Performed by: INTERNAL MEDICINE

## 2022-03-11 RX ORDER — EZETIMIBE 10 MG/1
10 TABLET ORAL DAILY
Qty: 90 TABLET | Refills: 3 | Status: SHIPPED | OUTPATIENT
Start: 2022-03-11

## 2022-03-11 RX ORDER — MECLIZINE HYDROCHLORIDE 25 MG/1
25 TABLET ORAL
Qty: 60 TABLET | Refills: 11 | Status: SHIPPED | OUTPATIENT
Start: 2022-03-11 | End: 2022-03-11 | Stop reason: SDUPTHER

## 2022-03-11 RX ORDER — DILTIAZEM HYDROCHLORIDE 180 MG/1
180 CAPSULE, COATED, EXTENDED RELEASE ORAL DAILY
Qty: 90 CAPSULE | Refills: 3 | Status: SHIPPED | OUTPATIENT
Start: 2022-03-11

## 2022-03-11 RX ORDER — EZETIMIBE 10 MG/1
10 TABLET ORAL DAILY
Qty: 90 TABLET | Refills: 3 | Status: SHIPPED | OUTPATIENT
Start: 2022-03-11 | End: 2022-03-11 | Stop reason: SDUPTHER

## 2022-03-11 RX ORDER — DILTIAZEM HYDROCHLORIDE 180 MG/1
180 CAPSULE, COATED, EXTENDED RELEASE ORAL DAILY
Qty: 90 CAPSULE | Refills: 3 | Status: SHIPPED | OUTPATIENT
Start: 2022-03-11 | End: 2022-03-11 | Stop reason: SDUPTHER

## 2022-03-11 RX ORDER — MECLIZINE HYDROCHLORIDE 25 MG/1
25 TABLET ORAL
Qty: 60 TABLET | Refills: 11 | Status: SHIPPED | OUTPATIENT
Start: 2022-03-11

## 2022-03-11 NOTE — PROGRESS NOTES
SPORTS MEDICINE AND PRIMARY CARE  Lj Varner MD, [address] [address]  Phone:  [phone]  Fax: [phone]       Chief Complaint   Patient presents with    Hypertension   . SUBJECTIVE:    Maxim Ceballos is a 80 y.o. female Patient returns today with a known history of primary hypertension, impaired glucose tolerance, stage 3A CKD, dyslipidemia, lumbar spinal stenosis and depression and is seen for evaluation. Patient returns today saying she is having episodes of dizziness. It initially was a spinning sensation, but more recently has become a balance sensation, where she feels off balance and actually afraid to go up and down the steps because of the balance issue. She went to the ER at the [facility]. She was having shoulder discomfort at that time. They gave her one Meclizine and it helped for a short term, but then of course the dizziness returned. Blood pressure was elevated at 196/101, 195/85, 152/87. Sunday her blood pressure was 220/78. They gave her an IV in the ER and took an x-ray of the shoulder and it was bone spurs. She is waiting for a follow up from the [facility] to try to decide if they are going to do an MRI, and also about pain management. Current Outpatient Medications   Medication Sig Dispense Refill    dilTIAZem ER (CARDIZEM CD) 180 mg capsule Take 1 Capsule by mouth daily. 90 Capsule 3    meclizine (ANTIVERT) 25 mg tablet Take 1 Tablet by mouth three (3) times daily as needed for Dizziness. 60 Tablet 11    ezetimibe (ZETIA) 10 mg tablet Take 1 Tablet by mouth daily. 90 Tablet 3    pantoprazole (PROTONIX) 40 mg tablet Take 1 Tablet by mouth daily.  30 Tablet 11    atorvastatin (LIPITOR) 80 mg tablet TAKE 1 TABLET BY MOUTH EVERY DAY 90 Tablet 3    losartan (COZAAR) 100 mg tablet TAKE 1 TABLET BY MOUTH EVERY DAY 90 Tablet 3    amLODIPine (NORVASC) 5 mg tablet TAKE 1 TABLET BY MOUTH EVERY DAY 90 Tablet 4    traZODone (DESYREL) 100 mg tablet TAKE 1 TABLET BY MOUTH EVERY DAY AT NIGHT 90 Tablet 3    fluticasone propionate (FLONASE) 50 mcg/actuation nasal spray 2 Sprays by Both Nostrils route daily. 1 Bottle 11    onabotulinumtoxinA (BOTOX INJECTION) Botox      SUMAtriptan (IMITREX) 100 mg tablet Take 100 mg by mouth once as needed for Migraine.  topiramate (TOPAMAX) 200 mg tablet Take  by mouth nightly.         SALINE NOSE 0.65 % nasal squeeze bottle USE 2 SPRAYS IN EACH NOSTRIL 4 TIMES DAILY 44 mL 11     Past Medical History:   Diagnosis Date    Abdominal pain     Bereavement 3/16    father - dementia - age 80    CKD (chronic kidney disease), stage III ([facility] [address] 03/16/2017    Dense breasts     Depression     Dyslipidemia     Encounter for Hemoccult screening 10/20/2020    fit  neg    Headache     High cholesterol     HTN (hypertension)     Hypertension     Migraines     Prediabetes     Rash     S/P colonoscopy 04/18/2019    renee guzman md - tubular adenoma    Sinus congestion     Spinal stenosis     UTI (lower urinary tract infection)      Past Surgical History:   Procedure Laterality Date    HX BREAST BIOPSY Right 2000    neg; surgical bx    HX COLONOSCOPY      HX GYN      HX HYSTERECTOMY       Allergies   Allergen Reactions    Tamiflu [Oseltamivir Phosphate] Hives         REVIEW OF SYSTEMS:  General: negative for - chills or fever  ENT: negative for - headaches, nasal congestion or tinnitus  Respiratory: negative for - cough, hemoptysis, shortness of breath or wheezing  Cardiovascular : negative for - chest pain, edema, palpitations or shortness of breath  Gastrointestinal: negative for - abdominal pain, blood in stools, heartburn or nausea/vomiting  Genito-Urinary: no dysuria, trouble voiding, or hematuria  Musculoskeletal: negative for - gait disturbance, joint pain, joint stiffness or joint swelling  Neurological: no TIA or stroke symptoms  Hematologic: no bruises, no bleeding, no swollen glands  Integument: no lumps, mole changes, nail changes or rash  Endocrine: no malaise/lethargy or unexpected weight changes      Social History     Socioeconomic History    Marital status:    Tobacco Use    Smoking status: Never Smoker    Smokeless tobacco: Never Used   Vaping Use    Vaping Use: Never used   Substance and Sexual Activity    Alcohol use: No    Drug use: No    Sexual activity: Not Currently     Partners: Male     Birth control/protection: None   Social History Narrative         Family History: Mother:  76 yrs, Hypertension and diabetes mi,cvaFather: alive 80 yrs, htnDaughter(s): alive 32 yrsSon(s): alive 39 son Claudette Souza , 1 daughter(s)         Social History: Alcohol Use Patient uses alcohol, Drinks per occasion: 2, Drinks per w [place]: 0. Smoking Status Patient is a never smoker. Marital Status: . Lives .  left suddenly  returned ! 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 Education/School: has highschool diploma, has colleg[employer]ipl[employer]. She developed migraines while she was in the service her [employer] preference is [employer]. Family History   Problem Relation Age of Onset    Stroke Mother        OBJECTIVE:    Visit Vitals  BP (!) 154/80   Pulse 78   Temp 98 °F (36.7 °C) (Oral)   Resp 18   Ht 5' 5\" (1.651 m)   Wt 163 lb 11.2 oz (74.3 kg)   SpO2 94%   BMI 27.24 kg/m²     CONSTITUTIONAL: well , well nourished, appears age appropriate  EYES: perrla, eom intact  ENMT:moist mucous membranes, pharynx clear  NECK: supple. Thyroid normal  RESPIRATORY: Chest: clear bilaterally   CARDIOVASCULAR: Heart: regular rate and rhythm  GASTROINTESTINAL: Abdomen: soft, bowel sounds active  HEMATOLOGIC: no pathological lymph nodes palpated  MUSCULOSKELETAL: Extremities: no edema, pulse 1+   INTEGUMENT: No unusual rashes or suspicious skin lesions noted.  Nails appear normal.  NEUROLOGIC: non-focal exam   MENTAL STATUS: alert and oriented, appropriate affect           ASSESSMENT:  1. Primary hypertension    2. IGT (impaired glucose tolerance)    3. Stage 3a chronic kidney disease ([facility] [address]    4. Dyslipidemia    5. Spinal stenosis of lumbosacral region    6. Mild depression ([facility] [address]      Blood pressure control is lacking. We will add Diltiazem to her current regimen.  ________________ (clipped audio) although it is a calcium channel blocker, for which she is currently on Amlodipine, this is a non-dihydropyridine. For the dizziness, we will use Meclizine and see if we can make her feel better. If she still feels poorly, she will let us know. She has impaired glucose tolerance and hemoglobin A1c was acceptable on the last visit. She has stage 3A CKD, which we continue to follow. We will check it again in June of this year. She has dyslipidemia, and if she is not at goal, we will add Zetia to Lipitor 80 mg. Spinal stenosis is not currently symptomatic. There is a component of depression. She is having challenges with her ex-, whom she is caring for because of the stroke. She will be back to see me in June, sooner if needed. We encourage him to use [facility] for communication. I have discussed the diagnosis with the patient and the intended plan as seen in the  Orders. The patient understands and agees with the plan. The patient has   received an after visit summary and questions were answered concerning  future plans  Patient labs and/or xrays were reviewed  Past records were reviewed. PLAN:  .  Orders Placed This Encounter    DISCONTD: dilTIAZem ER (CARDIZEM CD) 180 mg capsule    DISCONTD: meclizine (ANTIVERT) 25 mg tablet    DISCONTD: ezetimibe (ZETIA) 10 mg tablet    dilTIAZem ER (CARDIZEM CD) 180 mg capsule    meclizine (ANTIVERT) 25 mg tablet    ezetimibe (ZETIA) 10 mg tablet       Follow-up and Dispositions    · Return in about 3 months (around 6/11/2022).                 ATTENTION:   This medical record was transcribed using an electronic medical records system. Although proofread, it may and can contain electronic and spelling errors. Other human spelling and other errors may be present. Corrections may be executed at a later time. Please feel free to contact us for any clarifications as needed.

## 2022-03-18 PROBLEM — M25.512 CHRONIC LEFT SHOULDER PAIN: Status: ACTIVE | Noted: 2021-10-25

## 2022-03-18 PROBLEM — G89.29 CHRONIC LEFT SHOULDER PAIN: Status: ACTIVE | Noted: 2021-10-25

## 2022-03-19 PROBLEM — F32.A MILD DEPRESSION: Status: ACTIVE | Noted: 2018-07-17

## 2022-03-20 PROBLEM — Z12.11 ENCOUNTER FOR HEMOCCULT SCREENING: Status: ACTIVE | Noted: 2020-10-20

## 2022-03-20 PROBLEM — N18.30 CKD (CHRONIC KIDNEY DISEASE), STAGE III (HCC): Status: ACTIVE | Noted: 2017-03-16

## 2022-03-29 NOTE — PROGRESS NOTES
[facility]   a part of [address] [place] [address]  [address] [address], [address] [place] [address], [address] [place] [address]  Phone: [phone] Fax: [phone]      Discharge Summary 2-15      Patient name: Sukumar He  : 1950  Provider#: [id number]  Referral source: Cyrus Leyden, *      Medical/Treatment Diagnosis: Chronic left shoulder pain [M25.512, G89.29]     Prior Hospitalization: see medical history     Comorbidities: migraines, HTN, caregiver for   Prior Level of Function: able to use L arm without significant discomfort  Medications: Verified on Patient Summary List  Start of Care: 21                                                                     Onset Date: 2021    Visits from Start of Care: 8     Missed Visits: --  Reporting Period : 21 to 22    Assessment/Summary of care: Ms. Amauri Lyons was seen for 8 sessions regarding her L shoulder pain. She made fair progress during that time, but was transitioning to further care at the [place] outpatient PT due to insurance coverage. Will d/c from care at this time. Short Term Goals: To be accomplished in 2-4 treatments:              3. Pt will be able to reach to at least the level of 90deg for improved mobility MET              2. Pt will be compliant with initial PT attendance and HEP use MET  [address] be accomplished in 10-12 treatments:              1.  Pt will be able to reach overhead to 120deg without significant shoulder pain PROGRESSING              2. Pt will be able to put on seatbelt with L arm without increase in pain NOT MET              3. Pt will be to use L arm during ADLs without increase in symptoms PROGRESSING              4. Pt will be able to carry light weight (5-10lb) in her L hand without increase in symptoms PROGRESSING    RECOMMENDATIONS:  [x]Discontinue therapy: [x]Patient transitioning to [place] for further PT     []Patient is non-compliant or has abdicated     []Due to lack of appreciable progress towards set [address] Greta Hinkle, PT, DPT 3/29/2022

## 2022-05-26 RX ORDER — TRAZODONE HYDROCHLORIDE 100 MG/1
TABLET ORAL
Qty: 90 TABLET | Refills: 3 | Status: SHIPPED | OUTPATIENT
Start: 2022-05-26

## 2022-09-28 ENCOUNTER — TRANSCRIBE ORDER (OUTPATIENT)
Dept: SCHEDULING | Age: 72
End: 2022-09-28

## 2022-09-28 DIAGNOSIS — Z12.31 VISIT FOR SCREENING MAMMOGRAM: Primary | ICD-10-CM

## 2022-10-25 ENCOUNTER — OFFICE VISIT (OUTPATIENT)
Dept: INTERNAL MEDICINE CLINIC | Age: 72
End: 2022-10-25
Payer: MEDICARE

## 2022-10-25 VITALS
DIASTOLIC BLOOD PRESSURE: 72 MMHG | SYSTOLIC BLOOD PRESSURE: 150 MMHG | HEART RATE: 79 BPM | HEIGHT: 65 IN | BODY MASS INDEX: 26.66 KG/M2 | RESPIRATION RATE: 20 BRPM | OXYGEN SATURATION: 100 % | WEIGHT: 160 LBS | TEMPERATURE: 98.6 F

## 2022-10-25 DIAGNOSIS — N18.31 STAGE 3A CHRONIC KIDNEY DISEASE (HCC): ICD-10-CM

## 2022-10-25 DIAGNOSIS — R41.3 MEMORY CHANGES: ICD-10-CM

## 2022-10-25 DIAGNOSIS — Z23 ENCOUNTER FOR IMMUNIZATION: Primary | ICD-10-CM

## 2022-10-25 DIAGNOSIS — E78.5 DYSLIPIDEMIA: ICD-10-CM

## 2022-10-25 DIAGNOSIS — M48.07 SPINAL STENOSIS OF LUMBOSACRAL REGION: ICD-10-CM

## 2022-10-25 DIAGNOSIS — R73.02 IGT (IMPAIRED GLUCOSE TOLERANCE): ICD-10-CM

## 2022-10-25 DIAGNOSIS — I10 PRIMARY HYPERTENSION: ICD-10-CM

## 2022-10-25 DIAGNOSIS — G43.909 MIGRAINE WITHOUT STATUS MIGRAINOSUS, NOT INTRACTABLE, UNSPECIFIED MIGRAINE TYPE: ICD-10-CM

## 2022-10-25 PROCEDURE — G8400 PT W/DXA NO RESULTS DOC: HCPCS | Performed by: INTERNAL MEDICINE

## 2022-10-25 PROCEDURE — G8753 SYS BP > OR = 140: HCPCS | Performed by: INTERNAL MEDICINE

## 2022-10-25 PROCEDURE — 3017F COLORECTAL CA SCREEN DOC REV: CPT | Performed by: INTERNAL MEDICINE

## 2022-10-25 PROCEDURE — G9717 DOC PT DX DEP/BP F/U NT REQ: HCPCS | Performed by: INTERNAL MEDICINE

## 2022-10-25 PROCEDURE — 1123F ACP DISCUSS/DSCN MKR DOCD: CPT | Performed by: INTERNAL MEDICINE

## 2022-10-25 PROCEDURE — G8536 NO DOC ELDER MAL SCRN: HCPCS | Performed by: INTERNAL MEDICINE

## 2022-10-25 PROCEDURE — 1101F PT FALLS ASSESS-DOCD LE1/YR: CPT | Performed by: INTERNAL MEDICINE

## 2022-10-25 PROCEDURE — 3078F DIAST BP <80 MM HG: CPT | Performed by: INTERNAL MEDICINE

## 2022-10-25 PROCEDURE — 3074F SYST BP LT 130 MM HG: CPT | Performed by: INTERNAL MEDICINE

## 2022-10-25 PROCEDURE — 1090F PRES/ABSN URINE INCON ASSESS: CPT | Performed by: INTERNAL MEDICINE

## 2022-10-25 PROCEDURE — G8754 DIAS BP LESS 90: HCPCS | Performed by: INTERNAL MEDICINE

## 2022-10-25 PROCEDURE — G8427 DOCREV CUR MEDS BY ELIG CLIN: HCPCS | Performed by: INTERNAL MEDICINE

## 2022-10-25 PROCEDURE — 99214 OFFICE O/P EST MOD 30 MIN: CPT | Performed by: INTERNAL MEDICINE

## 2022-10-25 PROCEDURE — G8417 CALC BMI ABV UP PARAM F/U: HCPCS | Performed by: INTERNAL MEDICINE

## 2022-10-25 PROCEDURE — G9899 SCRN MAM PERF RSLTS DOC: HCPCS | Performed by: INTERNAL MEDICINE

## 2022-10-25 RX ORDER — HYDRALAZINE HYDROCHLORIDE 25 MG/1
25 TABLET, FILM COATED ORAL 2 TIMES DAILY
Qty: 180 TABLET | Refills: 3 | Status: SHIPPED | OUTPATIENT
Start: 2022-10-25

## 2022-10-25 RX ORDER — FLUCONAZOLE 150 MG/1
150 TABLET ORAL DAILY
Qty: 2 TABLET | Refills: 1 | Status: SHIPPED | OUTPATIENT
Start: 2022-10-25 | End: 2022-10-26

## 2022-10-25 NOTE — PROGRESS NOTES
SPORTS MEDICINE AND PRIMARY CARE  Kal Noland MD, [address] [address]  Phone:  [phone]  Fax: [phone]       Chief Complaint   Patient presents with    Hypertension   . SUBJECTIVE:    Kristie Alvarado is a 71 y.o. female The patient returns today stating about three weeks ago, she had an episode of vertigo, was stumbling around and went to [facility] and was given some meclizine. She never started the Meclizine [please verify] for the following three weeks and now it is better. The patient is having memory loss which \"bothers her. \"  The loss of memory is described as when she goes out to take her medicine, she will forget to take the medicine or if she is having conversation with her children and she does not need remember the context of the conversation, she just forgets what she is supposed to be saying. The patient has a partial rotator cuff tear on the left shoulder and having surgery to repair the tear next month. Denies other specific complaints and is seen for evaluation. Last two months, she has also had more migraines which she thinks is related to the weather changes as well as stress. Current Outpatient Medications   Medication Sig Dispense Refill    fluconazole (DIFLUCAN) 150 mg tablet Take 1 Tablet by mouth daily for 1 day. 2 Tablet 1    hydrALAZINE (APRESOLINE) 25 mg tablet Take 1 Tablet by mouth two (2) times a day. 180 Tablet 3    traZODone (DESYREL) 100 mg tablet TAKE 1 TABLET BY MOUTH EVERY DAY AT NIGHT 90 Tablet 3    fluticasone propionate (FLONASE) 50 mcg/actuation nasal spray SPRAY 2 SPRAYS INTO EACH NOSTRIL EVERY DAY 3 Each 11    dilTIAZem ER (CARDIZEM CD) 180 mg capsule Take 1 Capsule by mouth daily. 90 Capsule 3    meclizine (ANTIVERT) 25 mg tablet Take 1 Tablet by mouth three (3) times daily as needed for Dizziness. 60 Tablet 11    ezetimibe (ZETIA) 10 mg tablet Take 1 Tablet by mouth daily.  90 Tablet 3    pantoprazole (PROTONIX) 40 mg tablet Take 1 Tablet by mouth daily. 30 Tablet 11    atorvastatin (LIPITOR) 80 mg tablet TAKE 1 TABLET BY MOUTH EVERY DAY 90 Tablet 3    losartan (COZAAR) 100 mg tablet TAKE 1 TABLET BY MOUTH EVERY DAY 90 Tablet 3    amLODIPine (NORVASC) 5 mg tablet TAKE 1 TABLET BY MOUTH EVERY DAY 90 Tablet 4    onabotulinumtoxinA (BOTOX INJECTION) Botox      SUMAtriptan (IMITREX) 100 mg tablet Take 100 mg by mouth once as needed for Migraine. SALINE NOSE 0.65 % nasal squeeze bottle USE 2 SPRAYS IN EACH NOSTRIL 4 TIMES DAILY 44 mL 11    topiramate (TOPAMAX) 200 mg tablet Take  by mouth nightly.          Past Medical History:   Diagnosis Date    Abdominal pain     Bereavement 3/16    father - dementia - age 80    CKD (chronic kidney disease), stage III ([facility] [address] 03/16/2017    Dense breasts     Depression     Dyslipidemia     Encounter for Hemoccult screening 10/20/2020    fit  neg    Headache     High cholesterol     HTN (hypertension)     Hypertension     Migraines     Prediabetes     Rash     S/P colonoscopy 04/18/2019    renee guzman md - tubular adenoma    Sinus congestion     Spinal stenosis     UTI (lower urinary tract infection)      Past Surgical History:   Procedure Laterality Date    HX BREAST BIOPSY Right 2000    neg; surgical bx    HX COLONOSCOPY      HX GYN      HX HYSTERECTOMY       Allergies   Allergen Reactions    Tamiflu [Oseltamivir Phosphate] Hives         REVIEW OF SYSTEMS:  General: negative for - chills or fever  ENT: negative for - headaches, nasal congestion or tinnitus  Respiratory: negative for - cough, hemoptysis, shortness of breath or wheezing  Cardiovascular : negative for - chest pain, edema, palpitations or shortness of breath  Gastrointestinal: negative for - abdominal pain, blood in stools, heartburn or nausea/vomiting  Genito-Urinary: no dysuria, trouble voiding, or hematuria  Musculoskeletal: negative for - gait disturbance, joint pain, joint stiffness or joint swelling  Neurological: no TIA or stroke symptoms  Hematologic: no bruises, no bleeding, no swollen glands  Integument: no lumps, mole changes, nail changes or rash  Endocrine: no malaise/lethargy or unexpected weight changes      Social History     Socioeconomic History    Marital status:    Tobacco Use    Smoking status: Never    Smokeless tobacco: Never   Vaping Use    Vaping Use: Never used   Substance and Sexual Activity    Alcohol use: No    Drug use: No    Sexual activity: Not Currently     Partners: Male     Birth control/protection: None   Social History Narrative         Family History: Mother:  76 yrs, Hypertension and diabetes mi,cvaFather: alive 80 yrs, htnDaughter(s): alive 32 yrsSon(s): alive 39 son Apoorva Pizarro , 1 daughter(s)         Social History: Alcohol Use Patient uses alcohol, Drinks per occasion: 2, Drinks per w [place]: 0. Smoking Status Patient is a never smoker. Marital Status: . Lives .  left suddenly  returned 2017! 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 She developed migraines while she was in the service her [employer] preference is [employer]. Family History   Problem Relation Age of Onset    Stroke Mother        OBJECTIVE:    Visit Vitals  BP (!) 150/72 (BP 1 Location: Left upper arm, BP Patient Position: Sitting)   Pulse 79   Temp 98.6 °F (37 °C) (Oral)   Resp 20   Ht 5' 5\" (1.651 m)   Wt 160 lb (72.6 kg)   SpO2 100%   BMI 26.63 kg/m²     CONSTITUTIONAL: well , well nourished, appears age appropriate  EYES: perrla, eom intact  ENMT:moist mucous membranes, pharynx clear  NECK: supple.  Thyroid normal  RESPIRATORY: Chest: clear bilaterally   CARDIOVASCULAR: Heart: regular rate and rhythm  GASTROINTESTINAL: Abdomen: soft, bowel sounds active  HEMATOLOGIC: no pathological lymph nodes palpated  MUSCULOSKELETAL: Extremities: no edema, pulse 1+   INTEGUMENT: No unusual rashes or suspicious skin lesions noted. Nails appear normal.  NEUROLOGIC: non-focal exam   MENTAL STATUS: alert and oriented, appropriate affect           ASSESSMENT:  1. Primary hypertension    2. Dyslipidemia    3. Migraine without status migrainosus, not intractable, unspecified migraine type    4. Spinal stenosis of lumbosacral region    5. IGT (impaired glucose tolerance)    6. Stage 3a chronic kidney disease ([facility] [address]    7. Memory changes      Blood pressure control is lacking. Repeat blood pressure remains elevated. We will add hydralazine 25 b.i.d. She will check her blood pressure and report to us if her blood pressure remains elevated. Known history of dyslipidemia with last cholesterol at 103 in January. More frequent migraines which she attributes to weather changes. No symptoms related to spinal stenosis. She has impaired glucose tolerance. We will check hemoglobin A1c. Chronic kidney disease for which we will check a renal panel as well as a CBC in view of the fact that she is going to have surgery next month. We advised her that her labs would be available in [facility] which will curtail her from having to get laboratory studies preoperatively for the procedure. She will be back to see me in four to six months or sooner if needed. I have discussed the diagnosis with the patient and the intended plan as seen in the  Orders. The patient understands and agees with the plan. The patient has   received an after visit summary and questions were answered concerning  future plans  Patient labs and/or xrays were reviewed  Past records were reviewed.     PLAN:  .  Orders Placed This Encounter    GAMMOPATHY EVAL, SPEP/BIJAL, IG QT/FLC    RENAL FUNCTION PANEL    CBC WITH AUTOMATED DIFF    URINALYSIS W/ REFLEX CULTURE    HEMOGLOBIN A1C WITH EAG    REFERRAL TO NEUROPSYCHOLOGY    fluconazole (DIFLUCAN) 150 mg tablet    hydrALAZINE (APRESOLINE) 25 mg tablet Follow-up and Dispositions    Return in about 4 months (around 2/25/2023). ATTENTION:   This medical record was transcribed using an electronic medical records system. Although proofread, it may and can contain electronic and spelling errors. Other human spelling and other errors may be present. Corrections may be executed at a later time. Please feel free to contact us for any clarifications as needed.

## 2022-10-25 NOTE — PROGRESS NOTES
Bhavin Garza is a 71 y.o. female    Chief Complaint   Patient presents with    Hypertension     1. Have you been to the ER, urgent care clinic since your last visit? Hospitalized since your last visit? No    2. Have you seen or consulted any other health care providers outside of the [address] [place] [address] since your last visit? Include any pap smears or colon screening.  No

## 2022-10-26 LAB
ALBUMIN SERPL-MCNC: 4.2 G/DL (ref 3.5–5)
ANION GAP SERPL CALC-SCNC: 5 MMOL/L (ref 5–15)
APPEARANCE UR: CLEAR
BACTERIA URNS QL MICRO: NEGATIVE /HPF
BASOPHILS # BLD: 0.1 K/UL (ref 0–0.1)
BASOPHILS NFR BLD: 1 % (ref 0–1)
BILIRUB UR QL: NEGATIVE
BUN SERPL-MCNC: 8 MG/DL (ref 6–20)
BUN/CREAT SERPL: 7 (ref 12–20)
CALCIUM SERPL-MCNC: 9.8 MG/DL (ref 8.5–10.1)
CHLORIDE SERPL-SCNC: 112 MMOL/L (ref 97–108)
CO2 SERPL-SCNC: 25 MMOL/L (ref 21–32)
COLOR UR: ABNORMAL
CREAT SERPL-MCNC: 1.15 MG/DL (ref 0.55–1.02)
DIFFERENTIAL METHOD BLD: ABNORMAL
EOSINOPHIL # BLD: 0.3 K/UL (ref 0–0.4)
EOSINOPHIL NFR BLD: 5 % (ref 0–7)
EPITH CASTS URNS QL MICRO: ABNORMAL /LPF
ERYTHROCYTE [DISTWIDTH] IN BLOOD BY AUTOMATED COUNT: 13.7 % (ref 11.5–14.5)
EST. AVERAGE GLUCOSE BLD GHB EST-MCNC: 117 MG/DL
GLUCOSE SERPL-MCNC: 96 MG/DL (ref 65–100)
GLUCOSE UR STRIP.AUTO-MCNC: NEGATIVE MG/DL
HBA1C MFR BLD: 5.7 % (ref 4–5.6)
HCT VFR BLD AUTO: 42.8 % (ref 35–47)
HGB BLD-MCNC: 13.1 G/DL (ref 11.5–16)
HGB UR QL STRIP: NEGATIVE
HYALINE CASTS URNS QL MICRO: ABNORMAL /LPF (ref 0–5)
IMM GRANULOCYTES # BLD AUTO: 0 K/UL (ref 0–0.04)
IMM GRANULOCYTES NFR BLD AUTO: 1 % (ref 0–0.5)
KETONES UR QL STRIP.AUTO: NEGATIVE MG/DL
LEUKOCYTE ESTERASE UR QL STRIP.AUTO: ABNORMAL
LYMPHOCYTES # BLD: 2.1 K/UL (ref 0.8–3.5)
LYMPHOCYTES NFR BLD: 40 % (ref 12–49)
MCH RBC QN AUTO: 28.4 PG (ref 26–34)
MCHC RBC AUTO-ENTMCNC: 30.6 G/DL (ref 30–36.5)
MCV RBC AUTO: 92.8 FL (ref 80–99)
MONOCYTES # BLD: 0.3 K/UL (ref 0–1)
MONOCYTES NFR BLD: 7 % (ref 5–13)
NEUTS SEG # BLD: 2.4 K/UL (ref 1.8–8)
NEUTS SEG NFR BLD: 46 % (ref 32–75)
NITRITE UR QL STRIP.AUTO: NEGATIVE
NRBC # BLD: 0 K/UL (ref 0–0.01)
NRBC BLD-RTO: 0 PER 100 WBC
PH UR STRIP: 7.5 [PH] (ref 5–8)
PHOSPHATE SERPL-MCNC: 2.3 MG/DL (ref 2.6–4.7)
PLATELET # BLD AUTO: 208 K/UL (ref 150–400)
PMV BLD AUTO: 10.7 FL (ref 8.9–12.9)
POTASSIUM SERPL-SCNC: 3.8 MMOL/L (ref 3.5–5.1)
PROT UR STRIP-MCNC: NEGATIVE MG/DL
RBC # BLD AUTO: 4.61 M/UL (ref 3.8–5.2)
RBC #/AREA URNS HPF: ABNORMAL /HPF (ref 0–5)
SODIUM SERPL-SCNC: 142 MMOL/L (ref 136–145)
SP GR UR REFRACTOMETRY: 1.01 (ref 1–1.03)
UA: UC IF INDICATED,UAUC: ABNORMAL
UROBILINOGEN UR QL STRIP.AUTO: 0.2 EU/DL (ref 0.2–1)
WBC # BLD AUTO: 5.2 K/UL (ref 3.6–11)
WBC URNS QL MICRO: ABNORMAL /HPF (ref 0–4)

## 2022-10-26 PROCEDURE — 90662 IIV NO PRSV INCREASED AG IM: CPT | Performed by: INTERNAL MEDICINE

## 2022-10-26 PROCEDURE — G0008 ADMIN INFLUENZA VIRUS VAC: HCPCS | Performed by: INTERNAL MEDICINE

## 2022-10-27 NOTE — PROGRESS NOTES
Laboratory studies are stable. Your kidney function is reflected in the BUN, creatinine, and GFR.   Please be sure to drink 64 ounces of water a day

## 2022-10-28 RX ORDER — AMLODIPINE BESYLATE 5 MG/1
TABLET ORAL
Qty: 90 TABLET | Refills: 4 | Status: SHIPPED | OUTPATIENT
Start: 2022-10-28

## 2022-10-28 RX ORDER — LOSARTAN POTASSIUM 100 MG/1
TABLET ORAL
Qty: 90 TABLET | Refills: 3 | Status: SHIPPED | OUTPATIENT
Start: 2022-10-28

## 2022-11-03 LAB
ALBUMIN SERPL ELPH-MCNC: 4.5 G/DL (ref 2.9–4.4)
ALBUMIN/GLOB SERPL: 1.2 {RATIO} (ref 0.7–1.7)
ALPHA1 GLOB SERPL ELPH-MCNC: 0.2 G/DL (ref 0–0.4)
ALPHA2 GLOB SERPL ELPH-MCNC: 0.8 G/DL (ref 0.4–1)
B-GLOBULIN SERPL ELPH-MCNC: 1.3 G/DL (ref 0.7–1.3)
GAMMA GLOB SERPL ELPH-MCNC: 1.5 G/DL (ref 0.4–1.8)
GLOBULIN SER-MCNC: 3.8 G/DL (ref 2.2–3.9)
IGA SERPL-MCNC: 231 MG/DL (ref 64–422)
IGG SERPL-MCNC: 1512 MG/DL (ref 586–1602)
IGM SERPL-MCNC: 53 MG/DL (ref 26–217)
INTERPRETATION SERPL IEP-IMP: ABNORMAL
KAPPA LC FREE SER-MCNC: 26.6 MG/L (ref 3.3–19.4)
KAPPA LC FREE/LAMBDA FREE SER: 1.65 {RATIO} (ref 0.26–1.65)
LAMBDA LC FREE SERPL-MCNC: 16.1 MG/L (ref 5.7–26.3)
M PROTEIN SERPL ELPH-MCNC: ABNORMAL G/DL
PROT SERPL-MCNC: 8.3 G/DL (ref 6–8.5)

## 2022-11-05 RX ORDER — ATORVASTATIN CALCIUM 80 MG/1
TABLET, FILM COATED ORAL
Qty: 90 TABLET | Refills: 3 | Status: SHIPPED | OUTPATIENT
Start: 2022-11-05

## 2022-11-15 ENCOUNTER — HOSPITAL ENCOUNTER (OUTPATIENT)
Dept: MAMMOGRAPHY | Age: 72
Discharge: HOME | End: 2022-11-15
Attending: INTERNAL MEDICINE
Payer: MEDICARE

## 2022-11-15 DIAGNOSIS — Z12.31 VISIT FOR SCREENING MAMMOGRAM: ICD-10-CM

## 2022-11-15 PROCEDURE — 77067 SCR MAMMO BI INCL CAD: CPT

## 2023-01-07 ENCOUNTER — TELEPHONE (OUTPATIENT)
Dept: NEUROLOGY | Age: 73
End: 2023-01-07

## 2023-01-26 RX ORDER — PANTOPRAZOLE SODIUM 40 MG/1
TABLET, DELAYED RELEASE ORAL
Qty: 90 TABLET | Refills: 3 | Status: SHIPPED | OUTPATIENT
Start: 2023-01-26

## 2023-02-15 ENCOUNTER — OFFICE VISIT (OUTPATIENT)
Dept: INTERNAL MEDICINE CLINIC | Age: 73
End: 2023-02-15
Payer: MEDICARE

## 2023-02-15 VITALS
RESPIRATION RATE: 18 BRPM | WEIGHT: 156.2 LBS | HEART RATE: 78 BPM | HEIGHT: 65 IN | BODY MASS INDEX: 26.02 KG/M2 | DIASTOLIC BLOOD PRESSURE: 82 MMHG | OXYGEN SATURATION: 99 % | TEMPERATURE: 98.9 F | SYSTOLIC BLOOD PRESSURE: 124 MMHG

## 2023-02-15 DIAGNOSIS — E78.5 DYSLIPIDEMIA: ICD-10-CM

## 2023-02-15 DIAGNOSIS — Z13.39 SCREENING FOR ALCOHOLISM: ICD-10-CM

## 2023-02-15 DIAGNOSIS — M79.661 RIGHT CALF PAIN: ICD-10-CM

## 2023-02-15 DIAGNOSIS — N18.31 STAGE 3A CHRONIC KIDNEY DISEASE (HCC): ICD-10-CM

## 2023-02-15 DIAGNOSIS — I10 PRIMARY HYPERTENSION: ICD-10-CM

## 2023-02-15 DIAGNOSIS — R73.02 IGT (IMPAIRED GLUCOSE TOLERANCE): ICD-10-CM

## 2023-02-15 DIAGNOSIS — F32.A MILD DEPRESSION: ICD-10-CM

## 2023-02-15 DIAGNOSIS — Z00.00 MEDICARE ANNUAL WELLNESS VISIT, SUBSEQUENT: Primary | ICD-10-CM

## 2023-02-15 DIAGNOSIS — M48.07 SPINAL STENOSIS OF LUMBOSACRAL REGION: ICD-10-CM

## 2023-02-15 RX ORDER — CETIRIZINE HYDROCHLORIDE 10 MG/1
10 TABLET ORAL
Qty: 30 TABLET | Refills: 11 | Status: SHIPPED | OUTPATIENT
Start: 2023-02-15

## 2023-02-15 NOTE — PATIENT INSTRUCTIONS
Medicare Wellness Visit, Female     The best way to live healthy is to have a lifestyle where you eat a well-balanced diet, exercise regularly, limit alcohol use, and quit all forms of tobacco/nicotine, if applicable. Regular preventive services are another way to keep healthy. Preventive services (vaccines, screening tests, monitoring & exams) can help personalize your care plan, which helps you manage your own care. Screening tests can find health problems at the earliest stages, when they are easiest to treat. [facility] follows the current, evidence-based guidelines published by the [facility] ([facility]) when recommending preventive services for our patients. Because we follow these guidelines, sometimes recommendations change over time as research supports it. (For example, mammograms used to be recommended annually. Even though Medicare will still pay for an annual mammogram, the newer guidelines recommend a mammogram every two years for women of average risk). Of course, you and your doctor may decide to screen more often for some diseases, based on your risk and your co-morbidities (chronic disease you are already diagnosed with). Preventive services for you include:  - Medicare offers their members a free annual wellness visit, which is time for you and your primary care provider to discuss and plan for your preventive service needs.  Take advantage of this benefit every year!    -Over the age of 72 should receive the recommended pneumonia vaccines.    -All adults should have a flu vaccine yearly.  -All adults should have a tetanus vaccine every 10 years.   -Over the age 48 should receive the shingles vaccines.        -All adults should be screened once for Hepatitis C.  -All adults age 38-68 who are overweight should have a diabetes screening test once every three years.   -Other screening tests and preventive services for persons with diabetes include: an eye exam to screen for diabetic retinopathy, a kidney function test, a foot exam, and stricter control over your cholesterol.   -Cardiovascular screening for adults with routine risk involves an electrocardiogram (ECG) at intervals determined by your doctor.     -Colorectal cancer screenings should be done for adults age 39-70 with no increased risk factors for colorectal cancer. There are a number of acceptable methods of screening for this type of cancer. Each test has its own benefits and drawbacks. Discuss with your doctor what is most appropriate for you during your annual wellness visit. The different tests include: colonoscopy (considered the best screening method), a fecal occult blood test, a fecal DNA test, and sigmoidoscopy.    -Lung cancer screening is recommended annually with a low dose CT scan for adults between age 54 and 68, who have smoked at least 30 pack years (equivalent of 1 pack per day for 30 days), and who is a current smoker or quit less than 15 years ago.    -A bone mass density test is recommended when a woman turns 65 to screen for osteoporosis. This test is only recommended one time, as a screening. Some providers will use this same test as a disease monitoring tool if you already have osteoporosis. -Breast cancer screenings are recommended every other year for women of normal risk, age 54-69.    -Cervical cancer screenings for women over age 72 are only recommended with certain risk factors.      Here is a list of your current Health Maintenance items (your personalized list of preventive services) with a due date:  Health Maintenance Due   Topic Date Due    Pneumococcal Vaccine (1 - PCV) Never done    COVID-19 Vaccine (4 - Booster for Pfizer series) 03/08/2022    Cholesterol Test   01/27/2023

## 2023-02-15 NOTE — PROGRESS NOTES
[facility]  Franklin Ragland MD, [address] [address]  Phone:  [phone]  Fax: [phone]    Chief Complaint   Patient presents with    Annual Wellness Visit     Patient here for Annual Wellness Exam. Also reports fatigue, runny nose, chills and headache. SUBECTIVE:  Sinai Cantu 71 y.o femalereturns today with a history of stage 3A chronic kidney disease, impaired glucose tolerance, primary hypertension, dyslipidemia, spinal stenosis lumbar region, mild depression and complains of fatigue, rhinorrhea, chills and headaches. Patient states, \"I am falling apart\". She is tired continuously. She feels cold all the time. She has body aches and right calf pain for the past three weeks. She has a constant runny nose. She has tried Xyzal without relief. She is now trying Joyce-Ono-Cold. Patient is seen for evaluati    Current Outpatient Medications   Medication Sig Dispense Refill    atorvastatin (LIPITOR) 80 mg tablet TAKE 1 TABLET BY MOUTH EVERY DAY 90 Tablet 3    amLODIPine (NORVASC) 5 mg tablet TAKE 1 TABLET BY MOUTH EVERY DAY 90 Tablet 4    losartan (COZAAR) 100 mg tablet TAKE 1 TABLET BY MOUTH EVERY DAY 90 Tablet 3    hydrALAZINE (APRESOLINE) 25 mg tablet Take 1 Tablet by mouth two (2) times a day. 180 Tablet 3    traZODone (DESYREL) 100 mg tablet TAKE 1 TABLET BY MOUTH EVERY DAY AT NIGHT 90 Tablet 3    meclizine (ANTIVERT) 25 mg tablet Take 1 Tablet by mouth three (3) times daily as needed for Dizziness. 60 Tablet 11    ezetimibe (ZETIA) 10 mg tablet Take 1 Tablet by mouth daily. 90 Tablet 3    onabotulinumtoxinA (BOTOX INJECTION) Botox      SUMAtriptan (IMITREX) 100 mg tablet Take 100 mg by mouth once as needed for Migraine. SALINE NOSE 0.65 % nasal squeeze bottle USE 2 SPRAYS IN EACH NOSTRIL 4 TIMES DAILY 44 mL 11    topiramate (TOPAMAX) 200 mg tablet Take  by mouth nightly.          Past Medical History:   Diagnosis Date    Abdominal pain Bereavement 3/16    father - dementia - age 80    CKD (chronic kidney disease), stage III ([facility] [address] 2017    Dense breasts     Depression     Dyslipidemia     Encounter for Hemoccult screening 10/20/2020    fit  neg    Headache     High cholesterol     HTN (hypertension)     Hypertension     Migraines     Prediabetes     Rash     S/P colonoscopy 2019    renee guzman md - tubular adenoma    Sinus congestion     Spinal stenosis     UTI (lower urinary tract infection)      Past Surgical History:   Procedure Laterality Date    HX BREAST BIOPSY Right     neg; surgical bx    HX COLONOSCOPY      HX GYN      HX HYSTERECTOMY       Allergies   Allergen Reactions    Tamiflu [Oseltamivir Phosphate] Hives       REVIEW OF SYSTEMS:   Patient had rotator cuff surgery. Social History     Socioeconomic History    Marital status:    Tobacco Use    Smoking status: Never    Smokeless tobacco: Never   Vaping Use    Vaping Use: Never used   Substance and Sexual Activity    Alcohol use: No    Drug use: No    Sexual activity: Not Currently     Partners: Male     Birth control/protection: None   Social History Narrative         Family History: Mother:  76 yrs, Hypertension and diabetes mi,cvaFather: alive 80 yrs, htnDaughter(s): alive 32 yrsSon(s): alive 39 son Cuong Adams , 1 daughter(s)         Social History: Alcohol Use Patient uses alcohol, Drinks per occasion: 2, Drinks per w [place]: 0. Smoking Status Patient is a never smoker. Marital Status: . Lives .  left suddenly  returned 2017! 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 Education/School: has highschool diploma, has collegediploma [employer].   She developed migraines while she was in the service her Sikhism preference is [employer].    r  Family History   Problem Relation Age of Onset    Stroke Mother        OBJECTIVE:  Visit Vitals  BP (!) 151/78   Pulse 78   Temp 98.9 °F (37.2 °C) (Oral)   Resp 18   Ht 5' 5\" (1.651 m)   Wt 156 lb 3.2 oz (70.9 kg)   SpO2 99%   BMI 25.99 kg/m²     ENT: perrla,  eom intact  NECK: supple. Thyroid normal  CHEST: clear to ascultation and percussion   HEART: regular rate and rhythm  ABD: soft, bowel sounds active  EXTREMITIES: no edema, pulse 1+     ASSESSMENT:  1. Medicare annual wellness visit, subsequent    2. Screening for alcoholism    3. Stage 3a chronic kidney disease ([facility] [address]    4. IGT (impaired glucose tolerance)    5. Primary hypertension    6. Dyslipidemia    7. Spinal stenosis of lumbosacral region    8. Mild depression    9. Right calf pain      Her  is no longer with her. He left. and from that perspective I think she is doing much better. 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 I am not sure I can explain all of the symptoms. I am concerned about the calf tenderness and we will request a doppler exam to rule out DVT. If it is present, then we will place her on a NOAC. Her other symptomatology is compatible with sinus congestion and sinusitis and although there is not an infective component, we suggest Zyrtec as a trial to see if that is helpful. She should continue to use a steroid nasal spray. I cannot explain the fatigue and we will check her thyroid and blood count to see if we can come up for an explanation. We will also check hemoglobin A1c for control of impaired glucose tolerance and check her renal status. Repeat blood pressure is 124/82, which is excellent. History of dyslipidemia is stable. Pain is related to lumbar stenosis. There is a history of depression, but certainly she does not have any symptoms to support that. We encouraged her to go ahead and get the neuropsychological evaluation. She will be back to see me in four to six months, sooner if needed.       I have discussed the diagnosis with the patient and the intended plan as seen in the  orders above. The patient understands and agees with the plan. The patient has   received an after visit summary and questions were answered concerning  future plans  Patient labs and/or xrays were reviewed  Past records were reviewed. PLAN:  .  Orders Placed This Encounter    CBC WITH AUTOMATED DIFF    RENAL FUNCTION PANEL    TSH 3RD GENERATION    HEMOGLOBIN A1C WITH EAG    cetirizine (ZYRTEC) 10 mg tablet       Follow-up and Dispositions    Return in about 4 months (around 6/15/2023). ATTENTION:   This medical record was transcribed using an electronic medical records system. Although proofread, it may and can contain electronic and spelling errors. Other human spelling and other errors may be present. Corrections may be executed at a later time. Please feel free to contact us for any clarifications as needed.

## 2023-02-15 NOTE — PROGRESS NOTES
Jay Schafer is a 71 y.o. female  Chief Complaint   Patient presents with    Annual Wellness Visit     Patient here for Annual Wellness Exam. Also reports fatigue, runny nose, chills and headache. 1. Have you been to the ER, urgent care clinic since your last visit? Hospitalized since your last visit? No    2. Have you seen or consulted any other health care providers outside of the [address] [place] [address] since your last visit? Include any pap smears or colon screening.  No O-T Plasty Text: The defect edges were debeveled with a #15 scalpel blade.  Given the location of the defect, shape of the defect and the proximity to free margins an O-T plasty was deemed most appropriate.  Using a sterile surgical marker, an appropriate O-T plasty was drawn incorporating the defect and placing the expected incisions within the relaxed skin tension lines where possible.    The area thus outlined was incised deep to adipose tissue with a #15 scalpel blade.  The skin margins were undermined to an appropriate distance in all directions utilizing iris scissors. Scalpel Size: 15 blade Size Of Margin In Cm: 0.2 Anesthesia Type: 1% lidocaine with epinephrine Island Pedicle Flap With Canthal Suspension Text: The defect edges were debeveled with a #15 scalpel blade.  Given the location of the defect, shape of the defect and the proximity to free margins an island pedicle advancement flap was deemed most appropriate.  Using a sterile surgical marker, an appropriate advancement flap was drawn incorporating the defect, outlining the appropriate donor tissue and placing the expected incisions within the relaxed skin tension lines where possible. The area thus outlined was incised deep to adipose tissue with a #15 scalpel blade.  The skin margins were undermined to an appropriate distance in all directions around the primary defect and laterally outward around the island pedicle utilizing iris scissors.  There was minimal undermining beneath the pedicle flap. A suspension suture was placed in the canthal tendon to prevent tension and prevent ectropion. Primary Defect Length (In Cm): 0 Show Accession Variable: Yes Positioning (Leave Blank If You Do Not Want): The patient was placed in a comfortable position exposing the surgical site. Lazy S Intermediate Repair Preamble Text (Leave Blank If You Do Not Want): Undermining was performed with blunt dissection. Advancement-Rotation Flap Text: The defect edges were debeveled with a #15 scalpel blade.  Given the location of the defect, shape of the defect and the proximity to free margins an advancement-rotation flap was deemed most appropriate.  Using a sterile surgical marker, an appropriate flap was drawn incorporating the defect and placing the expected incisions within the relaxed skin tension lines where possible. The area thus outlined was incised deep to adipose tissue with a #15 scalpel blade.  The skin margins were undermined to an appropriate distance in all directions utilizing iris scissors. Excision Depth: adipose tissue Crescentic Advancement Flap Text: The defect edges were debeveled with a #15 scalpel blade.  Given the location of the defect and the proximity to free margins a crescentic advancement flap was deemed most appropriate.  Using a sterile surgical marker, the appropriate advancement flap was drawn incorporating the defect and placing the expected incisions within the relaxed skin tension lines where possible.    The area thus outlined was incised deep to adipose tissue with a #15 scalpel blade.  The skin margins were undermined to an appropriate distance in all directions utilizing iris scissors. Complex Repair And Xenograft Text: The defect edges were debeveled with a #15 scalpel blade.  The primary defect was closed partially with a complex linear closure.  Given the location of the defect, shape of the defect and the proximity to free margins a xenograft was deemed most appropriate to repair the remaining defect.  The graft was trimmed to fit the size of the remaining defect.  The graft was then placed in the primary defect, oriented appropriately, and sutured into place. Hatchet Flap Text: The defect edges were debeveled with a #15 scalpel blade.  Given the location of the defect, shape of the defect and the proximity to free margins a hatchet flap was deemed most appropriate.  Using a sterile surgical marker, an appropriate hatchet flap was drawn incorporating the defect and placing the expected incisions within the relaxed skin tension lines where possible.    The area thus outlined was incised deep to adipose tissue with a #15 scalpel blade.  The skin margins were undermined to an appropriate distance in all directions utilizing iris scissors. A-T Advancement Flap Text: The defect edges were debeveled with a #15 scalpel blade.  Given the location of the defect, shape of the defect and the proximity to free margins an A-T advancement flap was deemed most appropriate.  Using a sterile surgical marker, an appropriate advancement flap was drawn incorporating the defect and placing the expected incisions within the relaxed skin tension lines where possible.    The area thus outlined was incised deep to adipose tissue with a #15 scalpel blade.  The skin margins were undermined to an appropriate distance in all directions utilizing iris scissors. Purse String (Simple) Text: Given the location of the defect and the characteristics of the surrounding skin a purse string simple closure was deemed most appropriate.  Undermining was performed circumferentially around the surgical defect.  A purse string suture was then placed and tightened. Bilobed Transposition Flap Text: The defect edges were debeveled with a #15 scalpel blade.  Given the location of the defect and the proximity to free margins a bilobed transposition flap was deemed most appropriate.  Using a sterile surgical marker, an appropriate bilobe flap drawn around the defect.    The area thus outlined was incised deep to adipose tissue with a #15 scalpel blade.  The skin margins were undermined to an appropriate distance in all directions utilizing iris scissors. Complex Repair And Z Plasty Text: The defect edges were debeveled with a #15 scalpel blade.  The primary defect was closed partially with a complex linear closure.  Given the location of the remaining defect, shape of the defect and the proximity to free margins a Z plasty was deemed most appropriate for complete closure of the defect.  Using a sterile surgical marker, an appropriate advancement flap was drawn incorporating the defect and placing the expected incisions within the relaxed skin tension lines where possible.    The area thus outlined was incised deep to adipose tissue with a #15 scalpel blade.  The skin margins were undermined to an appropriate distance in all directions utilizing iris scissors. Elliptical Excision Additional Text (Leave Blank If You Do Not Want): The margin was drawn around the clinically apparent lesion.  An elliptical shape was then drawn on the skin incorporating the lesion and margins.  Incisions were then made along these lines to the appropriate tissue plane and the lesion was extirpated. Muscle Hinge Flap Text: The defect edges were debeveled with a #15 scalpel blade.  Given the size, depth and location of the defect and the proximity to free margins a muscle hinge flap was deemed most appropriate.  Using a sterile surgical marker, an appropriate hinge flap was drawn incorporating the defect. The area thus outlined was incised with a #15 scalpel blade.  The skin margins were undermined to an appropriate distance in all directions utilizing iris scissors. Z Plasty Text: The lesion was extirpated to the level of the fat with a #15 scalpel blade.  Given the location of the defect, shape of the defect and the proximity to free margins a Z-plasty was deemed most appropriate for repair.  Using a sterile surgical marker, the appropriate transposition arms of the Z-plasty were drawn incorporating the defect and placing the expected incisions within the relaxed skin tension lines where possible.    The area thus outlined was incised deep to adipose tissue with a #15 scalpel blade.  The skin margins were undermined to an appropriate distance in all directions utilizing iris scissors.  The opposing transposition arms were then transposed into place in opposite direction and anchored with interrupted buried subcutaneous sutures. Interpolation Flap Text: A decision was made to reconstruct the defect utilizing an interpolation axial flap and a staged reconstruction.  A telfa template was made of the defect.  This telfa template was then used to outline the interpolation flap.  The donor area for the pedicle flap was then injected with anesthesia.  The flap was excised through the skin and subcutaneous tissue down to the layer of the underlying musculature.  The interpolation flap was carefully excised within this deep plane to maintain its blood supply.  The edges of the donor site were undermined.   The donor site was closed in a primary fashion.  The pedicle was then rotated into position and sutured.  Once the tube was sutured into place, adequate blood supply was confirmed with blanching and refill.  The pedicle was then wrapped with xeroform gauze and dressed appropriately with a telfa and gauze bandage to ensure continued blood supply and protect the attached pedicle. Epidermal Sutures: 5-0 Vicryl Rapide Cheek Interpolation Flap Text: A decision was made to reconstruct the defect utilizing an interpolation axial flap and a staged reconstruction.  A telfa template was made of the defect.  This telfa template was then used to outline the Cheek Interpolation flap.  The donor area for the pedicle flap was then injected with anesthesia.  The flap was excised through the skin and subcutaneous tissue down to the layer of the underlying musculature.  The interpolation flap was carefully excised within this deep plane to maintain its blood supply.  The edges of the donor site were undermined.   The donor site was closed in a primary fashion.  The pedicle was then rotated into position and sutured.  Once the tube was sutured into place, adequate blood supply was confirmed with blanching and refill.  The pedicle was then wrapped with xeroform gauze and dressed appropriately with a telfa and gauze bandage to ensure continued blood supply and protect the attached pedicle. Repair Performed By Another Provider Text (Leave Blank If You Do Not Want): After the tissue was excised the defect was repaired by another provider. Intermediate / Complex Repair - Final Wound Length In Cm: 4.5 V-Y Flap Text: The defect edges were debeveled with a #15 scalpel blade.  Given the location of the defect, shape of the defect and the proximity to free margins a V-Y flap was deemed most appropriate.  Using a sterile surgical marker, an appropriate advancement flap was drawn incorporating the defect and placing the expected incisions within the relaxed skin tension lines where possible.    The area thus outlined was incised deep to adipose tissue with a #15 scalpel blade.  The skin margins were undermined to an appropriate distance in all directions utilizing iris scissors. Mucosal Advancement Flap Text: Given the location of the defect, shape of the defect and the proximity to free margins a mucosal advancement flap was deemed most appropriate. Incisions were made with a 15 blade scalpel in the appropriate fashion along the cutaneous vermilion border and the mucosal lip. The remaining actinically damaged mucosal tissue was excised.  The mucosal advancement flap was then elevated to the gingival sulcus with care taken to preserve the neurovascular structures and advanced into the primary defect. Care was taken to ensure that precise realignment of the vermilion border was achieved. No Repair - Repaired With Adjacent Surgical Defect Text (Leave Blank If You Do Not Want): After the excision the defect was repaired concurrently with another surgical defect which was in close approximation. Deep Sutures: 4-0 Vicryl Surgeon Performing The Repair (Optional): Digna Size Of Lesion In Cm: 1.3 Complex Repair And Dorsal Nasal Flap Text: The defect edges were debeveled with a #15 scalpel blade.  The primary defect was closed partially with a complex linear closure.  Given the location of the remaining defect, shape of the defect and the proximity to free margins a dorsal nasal flap was deemed most appropriate for complete closure of the defect.  Using a sterile surgical marker, an appropriate flap was drawn incorporating the defect and placing the expected incisions within the relaxed skin tension lines where possible.    The area thus outlined was incised deep to adipose tissue with a #15 scalpel blade.  The skin margins were undermined to an appropriate distance in all directions utilizing iris scissors. Billing Type: Third-Party Bill Additional Anesthesia Volume In Cc: 6 Composite Graft Text: The defect edges were debeveled with a #15 scalpel blade.  Given the location of the defect, shape of the defect, the proximity to free margins and the fact the defect was full thickness a composite graft was deemed most appropriate.  The defect was outline and then transferred to the donor site.  A full thickness graft was then excised from the donor site. The graft was then placed in the primary defect, oriented appropriately and then sutured into place.  The secondary defect was then repaired using a primary closure. Saucerization Excision Additional Text (Leave Blank If You Do Not Want): The margin was drawn around the clinically apparent lesion.  Incisions were then made along these lines, in a tangential fashion, to the appropriate tissue plane and the lesion was extirpated. Consent was obtained from the patient. The risks and benefits to therapy were discussed in detail. Specifically, the risks of infection, scarring, bleeding, prolonged wound healing, incomplete removal, allergy to anesthesia, nerve injury and recurrence were addressed. Prior to the procedure, the treatment site was clearly identified and confirmed by the patient. All components of Universal Protocol/PAUSE Rule completed. Complex Repair And Rotation Flap Text: The defect edges were debeveled with a #15 scalpel blade.  The primary defect was closed partially with a complex linear closure.  Given the location of the remaining defect, shape of the defect and the proximity to free margins a rotation flap was deemed most appropriate for complete closure of the defect.  Using a sterile surgical marker, an appropriate advancement flap was drawn incorporating the defect and placing the expected incisions within the relaxed skin tension lines where possible.    The area thus outlined was incised deep to adipose tissue with a #15 scalpel blade.  The skin margins were undermined to an appropriate distance in all directions utilizing iris scissors. Complex Repair And Dermal Autograft Text: The defect edges were debeveled with a #15 scalpel blade.  The primary defect was closed partially with a complex linear closure.  Given the location of the defect, shape of the defect and the proximity to free margins an dermal autograft was deemed most appropriate to repair the remaining defect.  The graft was trimmed to fit the size of the remaining defect.  The graft was then placed in the primary defect, oriented appropriately, and sutured into place. Advancement Flap (Double) Text: The defect edges were debeveled with a #15 scalpel blade.  Given the location of the defect and the proximity to free margins a double advancement flap was deemed most appropriate.  Using a sterile surgical marker, the appropriate advancement flaps were drawn incorporating the defect and placing the expected incisions within the relaxed skin tension lines where possible.    The area thus outlined was incised deep to adipose tissue with a #15 scalpel blade.  The skin margins were undermined to an appropriate distance in all directions utilizing iris scissors. Complex Repair And Tissue Cultured Epidermal Autograft Text: The defect edges were debeveled with a #15 scalpel blade.  The primary defect was closed partially with a complex linear closure.  Given the location of the defect, shape of the defect and the proximity to free margins an tissue cultured epidermal autograft was deemed most appropriate to repair the remaining defect.  The graft was trimmed to fit the size of the remaining defect.  The graft was then placed in the primary defect, oriented appropriately, and sutured into place. Burow's Advancement Flap Text: The defect edges were debeveled with a #15 scalpel blade.  Given the location of the defect and the proximity to free margins a Burow's advancement flap was deemed most appropriate.  Using a sterile surgical marker, the appropriate advancement flap was drawn incorporating the defect and placing the expected incisions within the relaxed skin tension lines where possible.    The area thus outlined was incised deep to adipose tissue with a #15 scalpel blade.  The skin margins were undermined to an appropriate distance in all directions utilizing iris scissors. Curvilinear Excision Additional Text (Leave Blank If You Do Not Want): The margin was drawn around the clinically apparent lesion.  A curvilinear shape was then drawn on the skin incorporating the lesion and margins.  Incisions were then made along these lines to the appropriate tissue plane and the lesion was extirpated. Tissue Cultured Epidermal Autograft Text: The defect edges were debeveled with a #15 scalpel blade.  Given the location of the defect, shape of the defect and the proximity to free margins a tissue cultured epidermal autograft was deemed most appropriate.  The graft was then trimmed to fit the size of the defect.  The graft was then placed in the primary defect and oriented appropriately. Star Wedge Flap Text: The defect edges were debeveled with a #15 scalpel blade.  Given the location of the defect, shape of the defect and the proximity to free margins a star wedge flap was deemed most appropriate.  Using a sterile surgical marker, an appropriate rotation flap was drawn incorporating the defect and placing the expected incisions within the relaxed skin tension lines where possible. The area thus outlined was incised deep to adipose tissue with a #15 scalpel blade.  The skin margins were undermined to an appropriate distance in all directions utilizing iris scissors. Complex Repair Preamble Text (Leave Blank If You Do Not Want): Extensive wide undermining was performed. Mercedes Flap Text: The defect edges were debeveled with a #15 scalpel blade.  Given the location of the defect, shape of the defect and the proximity to free margins a Mercedes flap was deemed most appropriate.  Using a sterile surgical marker, an appropriate advancement flap was drawn incorporating the defect and placing the expected incisions within the relaxed skin tension lines where possible. The area thus outlined was incised deep to adipose tissue with a #15 scalpel blade.  The skin margins were undermined to an appropriate distance in all directions utilizing iris scissors. Complex Repair And Ftsg Text: The defect edges were debeveled with a #15 scalpel blade.  The primary defect was closed partially with a complex linear closure.  Given the location of the defect, shape of the defect and the proximity to free margins a full thickness skin graft was deemed most appropriate to repair the remaining defect.  The graft was trimmed to fit the size of the remaining defect.  The graft was then placed in the primary defect, oriented appropriately, and sutured into place. Mastoid Interpolation Flap Text: A decision was made to reconstruct the defect utilizing an interpolation axial flap and a staged reconstruction.  A telfa template was made of the defect.  This telfa template was then used to outline the mastoid interpolation flap.  The donor area for the pedicle flap was then injected with anesthesia.  The flap was excised through the skin and subcutaneous tissue down to the layer of the underlying musculature.  The pedicle flap was carefully excised within this deep plane to maintain its blood supply.  The edges of the donor site were undermined.   The donor site was closed in a primary fashion.  The pedicle was then rotated into position and sutured.  Once the tube was sutured into place, adequate blood supply was confirmed with blanching and refill.  The pedicle was then wrapped with xeroform gauze and dressed appropriately with a telfa and gauze bandage to ensure continued blood supply and protect the attached pedicle. Cheek-To-Nose Interpolation Flap Text: A decision was made to reconstruct the defect utilizing an interpolation axial flap and a staged reconstruction.  A telfa template was made of the defect.  This telfa template was then used to outline the Cheek-To-Nose Interpolation flap.  The donor area for the pedicle flap was then injected with anesthesia.  The flap was excised through the skin and subcutaneous tissue down to the layer of the underlying musculature.  The interpolation flap was carefully excised within this deep plane to maintain its blood supply.  The edges of the donor site were undermined.   The donor site was closed in a primary fashion.  The pedicle was then rotated into position and sutured.  Once the tube was sutured into place, adequate blood supply was confirmed with blanching and refill.  The pedicle was then wrapped with xeroform gauze and dressed appropriately with a telfa and gauze bandage to ensure continued blood supply and protect the attached pedicle. Melolabial Interpolation Flap Text: A decision was made to reconstruct the defect utilizing an interpolation axial flap and a staged reconstruction.  A telfa template was made of the defect.  This telfa template was then used to outline the melolabial interpolation flap.  The donor area for the pedicle flap was then injected with anesthesia.  The flap was excised through the skin and subcutaneous tissue down to the layer of the underlying musculature.  The pedicle flap was carefully excised within this deep plane to maintain its blood supply.  The edges of the donor site were undermined.   The donor site was closed in a primary fashion.  The pedicle was then rotated into position and sutured.  Once the tube was sutured into place, adequate blood supply was confirmed with blanching and refill.  The pedicle was then wrapped with xeroform gauze and dressed appropriately with a telfa and gauze bandage to ensure continued blood supply and protect the attached pedicle. Cartilage Graft Text: The defect edges were debeveled with a #15 scalpel blade.  Given the location of the defect, shape of the defect, the fact the defect involved a full thickness cartilage defect a cartilage graft was deemed most appropriate.  An appropriate donor site was identified, cleansed, and anesthetized. The cartilage graft was then harvested and transferred to the recipient site, oriented appropriately and then sutured into place.  The secondary defect was then repaired using a primary closure. Graft Donor Site Will Heal By Secondary Intention: No Transposition Flap Text: The defect edges were debeveled with a #15 scalpel blade.  Given the location of the defect and the proximity to free margins a transposition flap was deemed most appropriate.  Using a sterile surgical marker, an appropriate transposition flap was drawn incorporating the defect.    The area thus outlined was incised deep to adipose tissue with a #15 scalpel blade.  The skin margins were undermined to an appropriate distance in all directions utilizing iris scissors. Trilobed Flap Text: The defect edges were debeveled with a #15 scalpel blade.  Given the location of the defect and the proximity to free margins a trilobed flap was deemed most appropriate.  Using a sterile surgical marker, an appropriate trilobed flap drawn around the defect.    The area thus outlined was incised deep to adipose tissue with a #15 scalpel blade.  The skin margins were undermined to an appropriate distance in all directions utilizing iris scissors. Path Notes (To The Dermatopathologist): Please check margins. Epidermal Autograft Text: The defect edges were debeveled with a #15 scalpel blade.  Given the location of the defect, shape of the defect and the proximity to free margins an epidermal autograft was deemed most appropriate.  Using a sterile surgical marker, the primary defect shape was transferred to the donor site. The epidermal graft was then harvested.  The skin graft was then placed in the primary defect and oriented appropriately. Complex Repair And Double M Plasty Text: The defect edges were debeveled with a #15 scalpel blade.  The primary defect was closed partially with a complex linear closure.  Given the location of the remaining defect, shape of the defect and the proximity to free margins a double M plasty was deemed most appropriate for complete closure of the defect.  Using a sterile surgical marker, an appropriate advancement flap was drawn incorporating the defect and placing the expected incisions within the relaxed skin tension lines where possible.    The area thus outlined was incised deep to adipose tissue with a #15 scalpel blade.  The skin margins were undermined to an appropriate distance in all directions utilizing iris scissors. Keystone Flap Text: The defect edges were debeveled with a #15 scalpel blade.  Given the location of the defect, shape of the defect a keystone flap was deemed most appropriate.  Using a sterile surgical marker, an appropriate keystone flap was drawn incorporating the defect, outlining the appropriate donor tissue and placing the expected incisions within the relaxed skin tension lines where possible. The area thus outlined was incised deep to adipose tissue with a #15 scalpel blade.  The skin margins were undermined to an appropriate distance in all directions around the primary defect and laterally outward around the flap utilizing iris scissors. Partial Purse String (Intermediate) Text: Given the location of the defect and the characteristics of the surrounding skin an intermediate purse string closure was deemed most appropriate.  Undermining was performed circumferentially around the surgical defect.  A purse string suture was then placed and tightened. Wound tension of the circular defect prevented complete closure of the wound. Body Location Override (Optional - Billing Will Still Be Based On Selected Body Map Location If Applicable): left lateral superior chest Complex Repair And Melolabial Flap Text: The defect edges were debeveled with a #15 scalpel blade.  The primary defect was closed partially with a complex linear closure.  Given the location of the remaining defect, shape of the defect and the proximity to free margins a melolabial flap was deemed most appropriate for complete closure of the defect.  Using a sterile surgical marker, an appropriate advancement flap was drawn incorporating the defect and placing the expected incisions within the relaxed skin tension lines where possible.    The area thus outlined was incised deep to adipose tissue with a #15 scalpel blade.  The skin margins were undermined to an appropriate distance in all directions utilizing iris scissors. Repair Type: Complex Fusiform Excision Additional Text (Leave Blank If You Do Not Want): The margin was drawn around the clinically apparent lesion.  A fusiform shape was then drawn on the skin incorporating the lesion and margins.  Incisions were then made along these lines to the appropriate tissue plane and the lesion was extirpated. Complex Repair And Rhombic Flap Text: The defect edges were debeveled with a #15 scalpel blade.  The primary defect was closed partially with a complex linear closure.  Given the location of the remaining defect, shape of the defect and the proximity to free margins a rhombic flap was deemed most appropriate for complete closure of the defect.  Using a sterile surgical marker, an appropriate advancement flap was drawn incorporating the defect and placing the expected incisions within the relaxed skin tension lines where possible.    The area thus outlined was incised deep to adipose tissue with a #15 scalpel blade.  The skin margins were undermined to an appropriate distance in all directions utilizing iris scissors. Skin Substitute Text: The defect edges were debeveled with a #15 scalpel blade.  Given the location of the defect, shape of the defect and the proximity to free margins a skin substitute graft was deemed most appropriate.  The graft material was trimmed to fit the size of the defect. The graft was then placed in the primary defect and oriented appropriately. Complex Repair And Split-Thickness Skin Graft Text: The defect edges were debeveled with a #15 scalpel blade.  The primary defect was closed partially with a complex linear closure.  Given the location of the defect, shape of the defect and the proximity to free margins a split thickness skin graft was deemed most appropriate to repair the remaining defect.  The graft was trimmed to fit the size of the remaining defect.  The graft was then placed in the primary defect, oriented appropriately, and sutured into place. H Plasty Text: Given the location of the defect, shape of the defect and the proximity to free margins a H-plasty was deemed most appropriate for repair.  Using a sterile surgical marker, the appropriate advancement arms of the H-plasty were drawn incorporating the defect and placing the expected incisions within the relaxed skin tension lines where possible. The area thus outlined was incised deep to adipose tissue with a #15 scalpel blade. The skin margins were undermined to an appropriate distance in all directions utilizing iris scissors.  The opposing advancement arms were then advanced into place in opposite direction and anchored with interrupted buried subcutaneous sutures. Complex Repair And Epidermal Autograft Text: The defect edges were debeveled with a #15 scalpel blade.  The primary defect was closed partially with a complex linear closure.  Given the location of the defect, shape of the defect and the proximity to free margins an epidermal autograft was deemed most appropriate to repair the remaining defect.  The graft was trimmed to fit the size of the remaining defect.  The graft was then placed in the primary defect, oriented appropriately, and sutured into place. Rotation Flap Text: The defect edges were debeveled with a #15 scalpel blade.  Given the location of the defect, shape of the defect and the proximity to free margins a rotation flap was deemed most appropriate.  Using a sterile surgical marker, an appropriate rotation flap was drawn incorporating the defect and placing the expected incisions within the relaxed skin tension lines where possible.    The area thus outlined was incised deep to adipose tissue with a #15 scalpel blade.  The skin margins were undermined to an appropriate distance in all directions utilizing iris scissors. V-Y Plasty Text: The defect edges were debeveled with a #15 scalpel blade.  Given the location of the defect, shape of the defect and the proximity to free margins an V-Y advancement flap was deemed most appropriate.  Using a sterile surgical marker, an appropriate advancement flap was drawn incorporating the defect and placing the expected incisions within the relaxed skin tension lines where possible.    The area thus outlined was incised deep to adipose tissue with a #15 scalpel blade.  The skin margins were undermined to an appropriate distance in all directions utilizing iris scissors. Dorsal Nasal Flap Text: The defect edges were debeveled with a #15 scalpel blade.  Given the location of the defect and the proximity to free margins a dorsal nasal flap was deemed most appropriate.  Using a sterile surgical marker, an appropriate dorsal nasal flap was drawn around the defect.    The area thus outlined was incised deep to adipose tissue with a #15 scalpel blade.  The skin margins were undermined to an appropriate distance in all directions utilizing iris scissors. Complex Repair And Modified Advancement Flap Text: The defect edges were debeveled with a #15 scalpel blade.  The primary defect was closed partially with a complex linear closure.  Given the location of the remaining defect, shape of the defect and the proximity to free margins a modified advancement flap was deemed most appropriate for complete closure of the defect.  Using a sterile surgical marker, an appropriate advancement flap was drawn incorporating the defect and placing the expected incisions within the relaxed skin tension lines where possible.    The area thus outlined was incised deep to adipose tissue with a #15 scalpel blade.  The skin margins were undermined to an appropriate distance in all directions utilizing iris scissors. Excisional Biopsy Additional Text (Leave Blank If You Do Not Want): The margin was drawn around the clinically apparent lesion. An elliptical shape was then drawn on the skin incorporating the lesion and margins.  Incisions were then made along these lines to the appropriate tissue plane and the lesion was extirpated. Rhombic Flap Text: The defect edges were debeveled with a #15 scalpel blade.  Given the location of the defect and the proximity to free margins a rhombic flap was deemed most appropriate.  Using a sterile surgical marker, an appropriate rhombic flap was drawn incorporating the defect.    The area thus outlined was incised deep to adipose tissue with a #15 scalpel blade.  The skin margins were undermined to an appropriate distance in all directions utilizing iris scissors. Helical Rim Advancement Flap Text: The defect edges were debeveled with a #15 blade scalpel.  Given the location of the defect and the proximity to free margins (helical rim) a double helical rim advancement flap was deemed most appropriate.  Using a sterile surgical marker, the appropriate advancement flaps were drawn incorporating the defect and placing the expected incisions between the helical rim and antihelix where possible.  The area thus outlined was incised through and through with a #15 scalpel blade.  With a skin hook and iris scissors, the flaps were gently and sharply undermined and freed up. Double Island Pedicle Flap Text: The defect edges were debeveled with a #15 scalpel blade.  Given the location of the defect, shape of the defect and the proximity to free margins a double island pedicle advancement flap was deemed most appropriate.  Using a sterile surgical marker, an appropriate advancement flap was drawn incorporating the defect, outlining the appropriate donor tissue and placing the expected incisions within the relaxed skin tension lines where possible.    The area thus outlined was incised deep to adipose tissue with a #15 scalpel blade.  The skin margins were undermined to an appropriate distance in all directions around the primary defect and laterally outward around the island pedicle utilizing iris scissors.  There was minimal undermining beneath the pedicle flap. Post-Care Instructions: I reviewed with the patient in detail post-care instructions:\\n1. Apply bacitracin over the steri-strips.  \\n2. Cut non-stick pad (Telfa) to cover the steri-strips\\n3. Apply tape (hypafix) over the non-stick pad\\n4. Change once per day for 5 days\\n5. Shower with bandage on, change bandage after shower\\n\\nPatient is not to engage in any heavy lifting, exercise, hot tub, or swimming for the next 14 days. Should the patient develop any fevers, chills, bleeding, severe pain patient will contact the office immediately. Split-Thickness Skin Graft Text: The defect edges were debeveled with a #15 scalpel blade.  Given the location of the defect, shape of the defect and the proximity to free margins a split thickness skin graft was deemed most appropriate.  Using a sterile surgical marker, the primary defect shape was transferred to the donor site. The split thickness graft was then harvested.  The skin graft was then placed in the primary defect and oriented appropriately. Excision Method: Elliptical Estimated Blood Loss (Cc): minimal Modified Advancement Flap Text: The defect edges were debeveled with a #15 scalpel blade.  Given the location of the defect, shape of the defect and the proximity to free margins a modified advancement flap was deemed most appropriate.  Using a sterile surgical marker, an appropriate advancement flap was drawn incorporating the defect and placing the expected incisions within the relaxed skin tension lines where possible.    The area thus outlined was incised deep to adipose tissue with a #15 scalpel blade.  The skin margins were undermined to an appropriate distance in all directions utilizing iris scissors. Epidermal Closure: running subcuticular Complex Repair And Transposition Flap Text: The defect edges were debeveled with a #15 scalpel blade.  The primary defect was closed partially with a complex linear closure.  Given the location of the remaining defect, shape of the defect and the proximity to free margins a transposition flap was deemed most appropriate for complete closure of the defect.  Using a sterile surgical marker, an appropriate advancement flap was drawn incorporating the defect and placing the expected incisions within the relaxed skin tension lines where possible.    The area thus outlined was incised deep to adipose tissue with a #15 scalpel blade.  The skin margins were undermined to an appropriate distance in all directions utilizing iris scissors. Complex Repair And Bilobe Flap Text: The defect edges were debeveled with a #15 scalpel blade.  The primary defect was closed partially with a complex linear closure.  Given the location of the remaining defect, shape of the defect and the proximity to free margins a bilobe flap was deemed most appropriate for complete closure of the defect.  Using a sterile surgical marker, an appropriate advancement flap was drawn incorporating the defect and placing the expected incisions within the relaxed skin tension lines where possible.    The area thus outlined was incised deep to adipose tissue with a #15 scalpel blade.  The skin margins were undermined to an appropriate distance in all directions utilizing iris scissors. Posterior Auricular Interpolation Flap Text: A decision was made to reconstruct the defect utilizing an interpolation axial flap and a staged reconstruction.  A telfa template was made of the defect.  This telfa template was then used to outline the posterior auricular interpolation flap.  The donor area for the pedicle flap was then injected with anesthesia.  The flap was excised through the skin and subcutaneous tissue down to the layer of the underlying musculature.  The pedicle flap was carefully excised within this deep plane to maintain its blood supply.  The edges of the donor site were undermined.   The donor site was closed in a primary fashion.  The pedicle was then rotated into position and sutured.  Once the tube was sutured into place, adequate blood supply was confirmed with blanching and refill.  The pedicle was then wrapped with xeroform gauze and dressed appropriately with a telfa and gauze bandage to ensure continued blood supply and protect the attached pedicle. Undermining Location (Optional): in the superficial subcutaneous fat Island Pedicle Flap Text: The defect edges were debeveled with a #15 scalpel blade.  Given the location of the defect, shape of the defect and the proximity to free margins an island pedicle advancement flap was deemed most appropriate.  Using a sterile surgical marker, an appropriate advancement flap was drawn incorporating the defect, outlining the appropriate donor tissue and placing the expected incisions within the relaxed skin tension lines where possible.    The area thus outlined was incised deep to adipose tissue with a #15 scalpel blade.  The skin margins were undermined to an appropriate distance in all directions around the primary defect and laterally outward around the island pedicle utilizing iris scissors.  There was minimal undermining beneath the pedicle flap. Home Suture Removal Text: Patient was provided a home suture removal kit and will remove their sutures at home.  If they have any questions or difficulties they will call the office. Island Pedicle Flap-Requiring Vessel Identification Text: The defect edges were debeveled with a #15 scalpel blade.  Given the location of the defect, shape of the defect and the proximity to free margins an island pedicle advancement flap was deemed most appropriate.  Using a sterile surgical marker, an appropriate advancement flap was drawn, based on the axial vessel mentioned above, incorporating the defect, outlining the appropriate donor tissue and placing the expected incisions within the relaxed skin tension lines where possible.    The area thus outlined was incised deep to adipose tissue with a #15 scalpel blade.  The skin margins were undermined to an appropriate distance in all directions around the primary defect and laterally outward around the island pedicle utilizing iris scissors.  There was minimal undermining beneath the pedicle flap. Bi-Rhombic Flap Text: The defect edges were debeveled with a #15 scalpel blade.  Given the location of the defect and the proximity to free margins a bi-rhombic flap was deemed most appropriate.  Using a sterile surgical marker, an appropriate rhombic flap was drawn incorporating the defect. The area thus outlined was incised deep to adipose tissue with a #15 scalpel blade.  The skin margins were undermined to an appropriate distance in all directions utilizing iris scissors. Slit Excision Additional Text (Leave Blank If You Do Not Want): A linear line was drawn on the skin overlying the lesion. An incision was made slowly until the lesion was visualized.  Once visualized, the lesion was removed with blunt dissection. Complex Repair And Double Advancement Flap Text: The defect edges were debeveled with a #15 scalpel blade.  The primary defect was closed partially with a complex linear closure.  Given the location of the remaining defect, shape of the defect and the proximity to free margins a double advancement flap was deemed most appropriate for complete closure of the defect.  Using a sterile surgical marker, an appropriate advancement flap was drawn incorporating the defect and placing the expected incisions within the relaxed skin tension lines where possible.    The area thus outlined was incised deep to adipose tissue with a #15 scalpel blade.  The skin margins were undermined to an appropriate distance in all directions utilizing iris scissors. Partial Purse String (Simple) Text: Given the location of the defect and the characteristics of the surrounding skin a simple purse string closure was deemed most appropriate.  Undermining was performed circumferentially around the surgical defect.  A purse string suture was then placed and tightened. Wound tension of the circular defect prevented complete closure of the wound. Banner Transposition Flap Text: The defect edges were debeveled with a #15 scalpel blade.  Given the location of the defect and the proximity to free margins a Banner transposition flap was deemed most appropriate.  Using a sterile surgical marker, an appropriate flap drawn around the defect. The area thus outlined was incised deep to adipose tissue with a #15 scalpel blade.  The skin margins were undermined to an appropriate distance in all directions utilizing iris scissors. Lab: [id number] Lab Facility: [id number] Wound Care: Bacitracin Pre-Excision Curettage Text (Leave Blank If You Do Not Want): Prior to drawing the surgical margin the visible lesion was removed with electrodesiccation and curettage to clearly define the lesion size. Complex Repair And A-T Advancement Flap Text: The defect edges were debeveled with a #15 scalpel blade.  The primary defect was closed partially with a complex linear closure.  Given the location of the remaining defect, shape of the defect and the proximity to free margins an A-T advancement flap was deemed most appropriate for complete closure of the defect.  Using a sterile surgical marker, an appropriate advancement flap was drawn incorporating the defect and placing the expected incisions within the relaxed skin tension lines where possible.    The area thus outlined was incised deep to adipose tissue with a #15 scalpel blade.  The skin margins were undermined to an appropriate distance in all directions utilizing iris scissors. O-T Advancement Flap Text: The defect edges were debeveled with a #15 scalpel blade.  Given the location of the defect, shape of the defect and the proximity to free margins an O-T advancement flap was deemed most appropriate.  Using a sterile surgical marker, an appropriate advancement flap was drawn incorporating the defect and placing the expected incisions within the relaxed skin tension lines where possible.    The area thus outlined was incised deep to adipose tissue with a #15 scalpel blade.  The skin margins were undermined to an appropriate distance in all directions utilizing iris scissors. Purse String (Intermediate) Text: Given the location of the defect and the characteristics of the surrounding skin a purse string intermediate closure was deemed most appropriate.  Undermining was performed circumfirentially around the surgical defect.  A purse string suture was then placed and tightened. Bilateral Helical Rim Advancement Flap Text: The defect edges were debeveled with a #15 blade scalpel.  Given the location of the defect and the proximity to free margins (helical rim) a bilateral helical rim advancement flap was deemed most appropriate.  Using a sterile surgical marker, the appropriate advancement flaps were drawn incorporating the defect and placing the expected incisions between the helical rim and antihelix where possible.  The area thus outlined was incised through and through with a #15 scalpel blade.  With a skin hook and iris scissors, the flaps were gently and sharply undermined and freed up. S Plasty Text: Given the location and shape of the defect, and the orientation of relaxed skin tension lines, an S-plasty was deemed most appropriate for repair.  Using a sterile surgical marker, the appropriate outline of the S-plasty was drawn, incorporating the defect and placing the expected incisions within the relaxed skin tension lines where possible.  The area thus outlined was incised deep to adipose tissue with a #15 scalpel blade.  The skin margins were undermined to an appropriate distance in all directions utilizing iris scissors. The skin flaps were advanced over the defect.  The opposing margins were then approximated with interrupted buried subcutaneous sutures. O-Z Plasty Text: The defect edges were debeveled with a #15 scalpel blade.  Given the location of the defect, shape of the defect and the proximity to free margins an O-Z plasty (double transposition flap) was deemed most appropriate.  Using a sterile surgical marker, the appropriate transposition flaps were drawn incorporating the defect and placing the expected incisions within the relaxed skin tension lines where possible.    The area thus outlined was incised deep to adipose tissue with a #15 scalpel blade.  The skin margins were undermined to an appropriate distance in all directions utilizing iris scissors.  Hemostasis was achieved with electrocautery.  The flaps were then transposed into place, one clockwise and the other counterclockwise, and anchored with interrupted buried subcutaneous sutures. Perilesional Excision Additional Text (Leave Blank If You Do Not Want): The margin was drawn around the clinically apparent lesion. Incisions were then made along these lines to the appropriate tissue plane and the lesion was extirpated. Complex Repair And O-L Flap Text: The defect edges were debeveled with a #15 scalpel blade.  The primary defect was closed partially with a complex linear closure.  Given the location of the remaining defect, shape of the defect and the proximity to free margins an O-L flap was deemed most appropriate for complete closure of the defect.  Using a sterile surgical marker, an appropriate flap was drawn incorporating the defect and placing the expected incisions within the relaxed skin tension lines where possible.    The area thus outlined was incised deep to adipose tissue with a #15 scalpel blade.  The skin margins were undermined to an appropriate distance in all directions utilizing iris scissors. Paramedian Forehead Flap Text: A decision was made to reconstruct the defect utilizing an interpolation axial flap and a staged reconstruction.  A telfa template was made of the defect.  This telfa template was then used to outline the paramedian forehead pedicle flap.  The donor area for the pedicle flap was then injected with anesthesia.  The flap was excised through the skin and subcutaneous tissue down to the layer of the underlying musculature.  The pedicle flap was carefully excised within this deep plane to maintain its blood supply.  The edges of the donor site were undermined.   The donor site was closed in a primary fashion.  The pedicle was then rotated into position and sutured.  Once the tube was sutured into place, adequate blood supply was confirmed with blanching and refill.  The pedicle was then wrapped with xeroform gauze and dressed appropriately with a telfa and gauze bandage to ensure continued blood supply and protect the attached pedicle. Date Of Previous Biopsy (Optional): 9/19/18 Detail Level: Detailed O-L Flap Text: The defect edges were debeveled with a #15 scalpel blade.  Given the location of the defect, shape of the defect and the proximity to free margins an O-L flap was deemed most appropriate.  Using a sterile surgical marker, an appropriate advancement flap was drawn incorporating the defect and placing the expected incisions within the relaxed skin tension lines where possible.    The area thus outlined was incised deep to adipose tissue with a #15 scalpel blade.  The skin margins were undermined to an appropriate distance in all directions utilizing iris scissors. Dermal Autograft Text: The defect edges were debeveled with a #15 scalpel blade.  Given the location of the defect, shape of the defect and the proximity to free margins a dermal autograft was deemed most appropriate.  Using a sterile surgical marker, the primary defect shape was transferred to the donor site. The area thus outlined was incised deep to adipose tissue with a #15 scalpel blade.  The harvested graft was then trimmed of adipose and epidermal tissue until only dermis was left.  The skin graft was then placed in the primary defect and oriented appropriately. Complex Repair And M Plasty Text: The defect edges were debeveled with a #15 scalpel blade.  The primary defect was closed partially with a complex linear closure.  Given the location of the remaining defect, shape of the defect and the proximity to free margins an M plasty was deemed most appropriate for complete closure of the defect.  Using a sterile surgical marker, an appropriate advancement flap was drawn incorporating the defect and placing the expected incisions within the relaxed skin tension lines where possible.    The area thus outlined was incised deep to adipose tissue with a #15 scalpel blade.  The skin margins were undermined to an appropriate distance in all directions utilizing iris scissors. Hemostasis: Electrocautery Melolabial Transposition Flap Text: The defect edges were debeveled with a #15 scalpel blade.  Given the location of the defect and the proximity to free margins a melolabial flap was deemed most appropriate.  Using a sterile surgical marker, an appropriate melolabial transposition flap was drawn incorporating the defect.    The area thus outlined was incised deep to adipose tissue with a #15 scalpel blade.  The skin margins were undermined to an appropriate distance in all directions utilizing iris scissors. Complex Repair And W Plasty Text: The defect edges were debeveled with a #15 scalpel blade.  The primary defect was closed partially with a complex linear closure.  Given the location of the remaining defect, shape of the defect and the proximity to free margins a W plasty was deemed most appropriate for complete closure of the defect.  Using a sterile surgical marker, an appropriate advancement flap was drawn incorporating the defect and placing the expected incisions within the relaxed skin tension lines where possible.    The area thus outlined was incised deep to adipose tissue with a #15 scalpel blade.  The skin margins were undermined to an appropriate distance in all directions utilizing iris scissors. Lip Wedge Excision Repair Text: Given the location of the defect and the proximity to free margins a full thickness wedge repair was deemed most appropriate.  Using a sterile surgical marker, the appropriate repair was drawn incorporating the defect and placing the expected incisions perpendicular to the vermilion border.  The vermilion border was also meticulously outlined to ensure appropriate reapproximation during the repair.  The area thus outlined was incised through and through with a #15 scalpel blade.  The muscularis and dermis were reaproximated with deep sutures following hemostasis. Care was taken to realign the vermilion border before proceeding with the superficial closure.  Once the vermilion was realigned the superfical and mucosal closure was finished. Previous Accession (Optional): [id number] Epidermal Closure Graft Donor Site (Optional): simple interrupted Advancement Flap (Single) Text: The defect edges were debeveled with a #15 scalpel blade.  Given the location of the defect and the proximity to free margins a single advancement flap was deemed most appropriate.  Using a sterile surgical marker, an appropriate advancement flap was drawn incorporating the defect and placing the expected incisions within the relaxed skin tension lines where possible.    The area thus outlined was incised deep to adipose tissue with a #15 scalpel blade.  The skin margins were undermined to an appropriate distance in all directions utilizing iris scissors. Spiral Flap Text: The defect edges were debeveled with a #15 scalpel blade.  Given the location of the defect, shape of the defect and the proximity to free margins a spiral flap was deemed most appropriate.  Using a sterile surgical marker, an appropriate rotation flap was drawn incorporating the defect and placing the expected incisions within the relaxed skin tension lines where possible. The area thus outlined was incised deep to adipose tissue with a #15 scalpel blade.  The skin margins were undermined to an appropriate distance in all directions utilizing iris scissors. Xenograft Text: The defect edges were debeveled with a #15 scalpel blade.  Given the location of the defect, shape of the defect and the proximity to free margins a xenograft was deemed most appropriate.  The graft was then trimmed to fit the size of the defect.  The graft was then placed in the primary defect and oriented appropriately. Complex Repair And V-Y Plasty Text: The defect edges were debeveled with a #15 scalpel blade.  The primary defect was closed partially with a complex linear closure.  Given the location of the remaining defect, shape of the defect and the proximity to free margins a V-Y plasty was deemed most appropriate for complete closure of the defect.  Using a sterile surgical marker, an appropriate advancement flap was drawn incorporating the defect and placing the expected incisions within the relaxed skin tension lines where possible.    The area thus outlined was incised deep to adipose tissue with a #15 scalpel blade.  The skin margins were undermined to an appropriate distance in all directions utilizing iris scissors. Anesthesia Volume In Cc: 12 Complex Repair And Skin Substitute Graft Text: The defect edges were debeveled with a #15 scalpel blade.  The primary defect was closed partially with a complex linear closure.  Given the location of the remaining defect, shape of the defect and the proximity to free margins a skin substitute graft was deemed most appropriate to repair the remaining defect.  The graft was trimmed to fit the size of the remaining defect.  The graft was then placed in the primary defect, oriented appropriately, and sutured into place. W Plasty Text: The lesion was extirpated to the level of the fat with a #15 scalpel blade.  Given the location of the defect, shape of the defect and the proximity to free margins a W-plasty was deemed most appropriate for repair.  Using a sterile surgical marker, the appropriate transposition arms of the W-plasty were drawn incorporating the defect and placing the expected incisions within the relaxed skin tension lines where possible.    The area thus outlined was incised deep to adipose tissue with a #15 scalpel blade.  The skin margins were undermined to an appropriate distance in all directions utilizing iris scissors.  The opposing transposition arms were then transposed into place in opposite direction and anchored with interrupted buried subcutaneous sutures. Dressing: dry sterile dressing Ftsg Text: The defect edges were debeveled with a #15 scalpel blade.  Given the location of the defect, shape of the defect and the proximity to free margins a full thickness skin graft was deemed most appropriate.  Using a sterile surgical marker, the primary defect shape was transferred to the donor site. The area thus outlined was incised deep to adipose tissue with a #15 scalpel blade.  The harvested graft was then trimmed of adipose tissue until only dermis and epidermis was left.  The skin margins of the secondary defect were undermined to an appropriate distance in all directions utilizing iris scissors.  The secondary defect was closed with interrupted buried subcutaneous sutures.  The skin edges were then re-apposed with running  sutures.  The skin graft was then placed in the primary defect and oriented appropriately. Complex Repair And Single Advancement Flap Text: The defect edges were debeveled with a #15 scalpel blade.  The primary defect was closed partially with a complex linear closure.  Given the location of the remaining defect, shape of the defect and the proximity to free margins a single advancement flap was deemed most appropriate for complete closure of the defect.  Using a sterile surgical marker, an appropriate advancement flap was drawn incorporating the defect and placing the expected incisions within the relaxed skin tension lines where possible.    The area thus outlined was incised deep to adipose tissue with a #15 scalpel blade.  The skin margins were undermined to an appropriate distance in all directions utilizing iris scissors. Graft Donor Site Bandage (Optional-Leave Blank If You Don't Want In Note): Steri-strips and a pressure bandage were applied to the donor site. Dermal Closure: buried vertical mattress Complex Repair And O-T Advancement Flap Text: The defect edges were debeveled with a #15 scalpel blade.  The primary defect was closed partially with a complex linear closure.  Given the location of the remaining defect, shape of the defect and the proximity to free margins an O-T advancement flap was deemed most appropriate for complete closure of the defect.  Using a sterile surgical marker, an appropriate advancement flap was drawn incorporating the defect and placing the expected incisions within the relaxed skin tension lines where possible.    The area thus outlined was incised deep to adipose tissue with a #15 scalpel blade.  The skin margins were undermined to an appropriate distance in all directions utilizing iris scissors. Alar Island Pedicle Flap Text: The defect edges were debeveled with a #15 scalpel blade.  Given the location of the defect, shape of the defect and the proximity to the alar rim an island pedicle advancement flap was deemed most appropriate.  Using a sterile surgical marker, an appropriate advancement flap was drawn incorporating the defect, outlining the appropriate donor tissue and placing the expected incisions within the nasal ala running parallel to the alar rim. The area thus outlined was incised with a #15 scalpel blade.  The skin margins were undermined minimally to an appropriate distance in all directions around the primary defect and laterally outward around the island pedicle utilizing iris scissors.  There was minimal undermining beneath the pedicle flap. Bilobed Flap Text: The defect edges were debeveled with a #15 scalpel blade.  Given the location of the defect and the proximity to free margins a bilobe flap was deemed most appropriate.  Using a sterile surgical marker, an appropriate bilobe flap drawn around the defect.    The area thus outlined was incised deep to adipose tissue with a #15 scalpel blade.  The skin margins were undermined to an appropriate distance in all directions utilizing iris scissors. Ear Star Wedge Flap Text: The defect edges were debeveled with a #15 blade scalpel.  Given the location of the defect and the proximity to free margins (helical rim) an ear star wedge flap was deemed most appropriate.  Using a sterile surgical marker, the appropriate flap was drawn incorporating the defect and placing the expected incisions between the helical rim and antihelix where possible.  The area thus outlined was incised through and through with a #15 scalpel blade.

## 2023-02-15 NOTE — PROGRESS NOTES
This is the Subsequent Medicare Annual Wellness Exam, performed 12 months or more after the Initial AWV or the last Subsequent AWV    I have reviewed the patient's medical history in detail and updated the computerized patient record. Assessment/Plan   Education and counseling provided:  Are appropriate based on today's review and evaluation  Depression Risk Factor Screening     3 most recent PHQ Screens 2/15/2023   Little interest or pleasure in doing things Not at all   Feeling down, depressed, irritable, or hopeless Not at all   Total Score PHQ 2 0   Trouble falling or staying asleep, or sleeping too much Not at all   Feeling tired or having little energy Not at all   Poor appetite, weight loss, or overeating Not at all   Feeling bad about yourself - or that you are a failure or have let yourself or your family down Not at all   Trouble concentrating on things such as school, work, reading, or watching TV Not at all   Moving or speaking so slowly that other people could have noticed; or the opposite being so fidgety that others notice Not at all   Thoughts of being better off dead, or hurting yourself in some way Not at all   PHQ 9 Score 0   How difficult have these problems made it for you to do your work, take care of your home and get along with others Not difficult at all       Alcohol & Drug Abuse Risk Screen    Do you average more than 1 drink per night or more than 7 drinks a week:  No    On any one occasion in the past three months have you have had more than 3 drinks containing alcohol:  No          Functional Ability and Level of Safety    Hearing: Hearing is good. Activities of Daily Living: The home contains: no safety equipment. Patient does total self care      Ambulation: with no difficulty     Fall Risk:  Fall Risk Assessment, last 12 mths 2/15/2023   Able to walk? Yes   Fall in past 12 months? 0   Do you feel unsteady?  0   Are you worried about falling 0      Abuse Screen:  Patient is not abused       Cognitive Screening    Has your family/caregiver stated any concerns about your memory: no     Cognitive Screening: Normal - Verbal Fluency Test    Health Maintenance Due     Health Maintenance Due   Topic Date Due    Pneumococcal 65+ years (1 - PCV) Never done    COVID-19 Vaccine (4 - Booster for Pfizer series) 03/08/2022    Lipid Screen  01/27/2023    Medicare Yearly Exam  01/28/2023       Patient Care Team   Patient Care Team:  Yusef Marrero MD as PCP - General (Internal Medicine Physician)  Yusef Marrero [facility] as PCP [facility]    History     Patient Active Problem List   Diagnosis Code    Dyslipidemia E78.5    Headache R51.9    Migraines G43.909    HTN (hypertension) I10    Spinal stenosis M48.00    IGT (impaired glucose tolerance) R73.02    Sinus congestion R09.81    Lower urinary tract infectious disease N39.0    Bereavement Z63.4    Rash R21    Dense breasts R92.2    Mild depression F32. A    CKD (chronic kidney disease), stage III ([facility]) N18.30    Abdominal pain R10.9    Encounter for Hemoccult screening Z12.11    Chronic left shoulder pain M25.512, G89.29     Past Medical History:   Diagnosis Date    Abdominal pain     Bereavement 3/16    father - dementia - age 80    CKD (chronic kidney disease), stage III ([facility] [address] 03/16/2017    Dense breasts     Depression     Dyslipidemia     Encounter for Hemoccult screening 10/20/2020    fit  neg    Headache     High cholesterol     HTN (hypertension)     Hypertension     Migraines     Prediabetes     Rash     S/P colonoscopy 04/18/2019    renee guzman md - tubular adenoma    Sinus congestion     Spinal stenosis     UTI (lower urinary tract infection)       Past Surgical History:   Procedure Laterality Date    HX BREAST BIOPSY Right 2000    neg; surgical bx    HX COLONOSCOPY      HX GYN      HX HYSTERECTOMY       Current Outpatient Medications   Medication Sig Dispense Refill    atorvastatin (LIPITOR) 80 mg tablet TAKE 1 TABLET BY MOUTH EVERY DAY 90 Tablet 3    amLODIPine (NORVASC) 5 mg tablet TAKE 1 TABLET BY MOUTH EVERY DAY 90 Tablet 4    losartan (COZAAR) 100 mg tablet TAKE 1 TABLET BY MOUTH EVERY DAY 90 Tablet 3    hydrALAZINE (APRESOLINE) 25 mg tablet Take 1 Tablet by mouth two (2) times a day. 180 Tablet 3    traZODone (DESYREL) 100 mg tablet TAKE 1 TABLET BY MOUTH EVERY DAY AT NIGHT 90 Tablet 3    meclizine (ANTIVERT) 25 mg tablet Take 1 Tablet by mouth three (3) times daily as needed for Dizziness. 60 Tablet 11    ezetimibe (ZETIA) 10 mg tablet Take 1 Tablet by mouth daily. 90 Tablet 3    onabotulinumtoxinA (BOTOX INJECTION) Botox      SUMAtriptan (IMITREX) 100 mg tablet Take 100 mg by mouth once as needed for Migraine. SALINE NOSE 0.65 % nasal squeeze bottle USE 2 SPRAYS IN EACH NOSTRIL 4 TIMES DAILY 44 mL 11    topiramate (TOPAMAX) 200 mg tablet Take  by mouth nightly. pantoprazole (PROTONIX) 40 mg tablet TAKE 1 TABLET BY MOUTH EVERY DAY (Patient not taking: Reported on 2/15/2023) 90 Tablet 3    fluticasone propionate (FLONASE) 50 mcg/actuation nasal spray SPRAY 2 SPRAYS INTO EACH NOSTRIL EVERY DAY (Patient not taking: Reported on 2/15/2023) 3 Each 11    dilTIAZem ER (CARDIZEM CD) 180 mg capsule Take 1 Capsule by mouth daily.  (Patient not taking: Reported on 2/15/2023) 90 Capsule 3     Allergies   Allergen Reactions    Tamiflu [Oseltamivir Phosphate] Hives       Family History   Problem Relation Age of Onset    Stroke Mother      Social History     Tobacco Use    Smoking status: Never    Smokeless tobacco: Never   Substance Use Topics    Alcohol use: No         Naman Hamilton LPN

## 2023-02-16 LAB
ALBUMIN SERPL-MCNC: 4.4 G/DL (ref 3.5–5)
ANION GAP SERPL CALC-SCNC: 8 MMOL/L (ref 5–15)
BASOPHILS # BLD: 0.1 K/UL (ref 0–0.1)
BASOPHILS NFR BLD: 1 % (ref 0–1)
BUN SERPL-MCNC: 9 MG/DL (ref 6–20)
BUN/CREAT SERPL: 8 (ref 12–20)
CALCIUM SERPL-MCNC: 10.1 MG/DL (ref 8.5–10.1)
CHLORIDE SERPL-SCNC: 112 MMOL/L (ref 97–108)
CO2 SERPL-SCNC: 23 MMOL/L (ref 21–32)
CREAT SERPL-MCNC: 1.16 MG/DL (ref 0.55–1.02)
DIFFERENTIAL METHOD BLD: NORMAL
EOSINOPHIL # BLD: 0.2 K/UL (ref 0–0.4)
EOSINOPHIL NFR BLD: 4 % (ref 0–7)
ERYTHROCYTE [DISTWIDTH] IN BLOOD BY AUTOMATED COUNT: 13.9 % (ref 11.5–14.5)
EST. AVERAGE GLUCOSE BLD GHB EST-MCNC: 111 MG/DL
GLUCOSE SERPL-MCNC: 98 MG/DL (ref 65–100)
HBA1C MFR BLD: 5.5 % (ref 4–5.6)
HCT VFR BLD AUTO: 37.8 % (ref 35–47)
HGB BLD-MCNC: 12.1 G/DL (ref 11.5–16)
IMM GRANULOCYTES # BLD AUTO: 0 K/UL (ref 0–0.04)
IMM GRANULOCYTES NFR BLD AUTO: 0 % (ref 0–0.5)
LYMPHOCYTES # BLD: 2.5 K/UL (ref 0.8–3.5)
LYMPHOCYTES NFR BLD: 40 % (ref 12–49)
MCH RBC QN AUTO: 29.2 PG (ref 26–34)
MCHC RBC AUTO-ENTMCNC: 32 G/DL (ref 30–36.5)
MCV RBC AUTO: 91.1 FL (ref 80–99)
MONOCYTES # BLD: 0.5 K/UL (ref 0–1)
MONOCYTES NFR BLD: 8 % (ref 5–13)
NEUTS SEG # BLD: 2.9 K/UL (ref 1.8–8)
NEUTS SEG NFR BLD: 47 % (ref 32–75)
NRBC # BLD: 0 K/UL (ref 0–0.01)
NRBC BLD-RTO: 0 PER 100 WBC
PHOSPHATE SERPL-MCNC: 2.5 MG/DL (ref 2.6–4.7)
PLATELET # BLD AUTO: 346 K/UL (ref 150–400)
PMV BLD AUTO: 10.3 FL (ref 8.9–12.9)
POTASSIUM SERPL-SCNC: 3.8 MMOL/L (ref 3.5–5.1)
RBC # BLD AUTO: 4.15 M/UL (ref 3.8–5.2)
SODIUM SERPL-SCNC: 143 MMOL/L (ref 136–145)
TSH SERPL DL<=0.05 MIU/L-ACNC: 0.96 UIU/ML (ref 0.36–3.74)
WBC # BLD AUTO: 6.2 K/UL (ref 3.6–11)

## 2023-03-07 ENCOUNTER — HOSPITAL ENCOUNTER (OUTPATIENT)
Dept: ULTRASOUND IMAGING | Age: 73
Discharge: HOME | End: 2023-03-07
Attending: INTERNAL MEDICINE
Payer: MEDICARE

## 2023-03-07 DIAGNOSIS — M79.661 RIGHT CALF PAIN: ICD-10-CM

## 2023-03-07 PROCEDURE — 93970 EXTREMITY STUDY: CPT

## 2023-05-29 RX ORDER — TRAZODONE HYDROCHLORIDE 100 MG/1
TABLET ORAL
Qty: 90 TABLET | Refills: 3 | Status: SHIPPED | OUTPATIENT
Start: 2023-05-29

## 2023-05-29 RX ORDER — MECLIZINE HYDROCHLORIDE 25 MG/1
TABLET ORAL
Qty: 60 TABLET | Refills: 11 | Status: SHIPPED | OUTPATIENT
Start: 2023-05-29

## 2023-07-28 ENCOUNTER — TELEPHONE (OUTPATIENT)
Age: 73
End: 2023-07-28

## 2023-10-26 RX ORDER — LOSARTAN POTASSIUM 100 MG/1
100 TABLET ORAL DAILY
Qty: 90 TABLET | Refills: 1 | Status: SHIPPED | OUTPATIENT
Start: 2023-10-26

## 2023-11-06 ENCOUNTER — TRANSCRIBE ORDERS (OUTPATIENT)
Facility: HOSPITAL | Age: 73
End: 2023-11-06

## 2023-11-06 DIAGNOSIS — Z12.31 ENCOUNTER FOR SCREENING MAMMOGRAM FOR MALIGNANT NEOPLASM OF BREAST: Primary | ICD-10-CM

## 2023-11-22 RX ORDER — FLUTICASONE PROPIONATE 50 MCG
2 SPRAY, SUSPENSION (ML) NASAL DAILY
Qty: 1 EACH | Refills: 11 | Status: SHIPPED | OUTPATIENT
Start: 2023-11-22

## 2023-12-12 ENCOUNTER — HOSPITAL ENCOUNTER (OUTPATIENT)
Facility: HOSPITAL | Age: 73
Discharge: HOME | End: 2023-12-15
Attending: INTERNAL MEDICINE
Payer: MEDICARE

## 2023-12-12 DIAGNOSIS — Z12.31 ENCOUNTER FOR SCREENING MAMMOGRAM FOR MALIGNANT NEOPLASM OF BREAST: ICD-10-CM

## 2023-12-12 PROCEDURE — 77063 BREAST TOMOSYNTHESIS BI: CPT

## 2023-12-18 PROBLEM — Z12.11 ENCOUNTER FOR HEMOCCULT SCREENING: Status: RESOLVED | Noted: 2020-10-20 | Resolved: 2023-12-18

## 2024-01-24 RX ORDER — AMLODIPINE BESYLATE 5 MG/1
5 TABLET ORAL DAILY
Qty: 90 TABLET | Refills: 3 | Status: SHIPPED | OUTPATIENT
Start: 2024-01-24

## 2024-03-22 RX ORDER — ROSUVASTATIN CALCIUM 10 MG/1
10 TABLET, COATED ORAL NIGHTLY
Qty: 90 TABLET | Refills: 3 | Status: SHIPPED | OUTPATIENT
Start: 2024-03-22

## 2024-04-26 RX ORDER — LOSARTAN POTASSIUM 100 MG/1
100 TABLET ORAL DAILY
Qty: 90 TABLET | Refills: 3 | Status: SHIPPED | OUTPATIENT
Start: 2024-04-26

## 2024-04-30 ENCOUNTER — OFFICE VISIT (OUTPATIENT)
Facility: CLINIC | Age: 74
End: 2024-04-30
Payer: MEDICARE

## 2024-04-30 VITALS
BODY MASS INDEX: 26.71 KG/M2 | HEART RATE: 83 BPM | OXYGEN SATURATION: 98 % | RESPIRATION RATE: 18 BRPM | SYSTOLIC BLOOD PRESSURE: 137 MMHG | DIASTOLIC BLOOD PRESSURE: 84 MMHG | HEIGHT: 65 IN | TEMPERATURE: 98.1 F | WEIGHT: 160.3 LBS

## 2024-04-30 DIAGNOSIS — R21 RASH: ICD-10-CM

## 2024-04-30 DIAGNOSIS — I10 PRIMARY HYPERTENSION: ICD-10-CM

## 2024-04-30 DIAGNOSIS — Z12.11 SCREEN FOR COLON CANCER: ICD-10-CM

## 2024-04-30 DIAGNOSIS — Z00.00 MEDICARE ANNUAL WELLNESS VISIT, SUBSEQUENT: Primary | ICD-10-CM

## 2024-04-30 DIAGNOSIS — R73.02 IGT (IMPAIRED GLUCOSE TOLERANCE): ICD-10-CM

## 2024-04-30 DIAGNOSIS — N18.31 STAGE 3A CHRONIC KIDNEY DISEASE (HCC): ICD-10-CM

## 2024-04-30 DIAGNOSIS — R92.30 DENSE BREASTS: ICD-10-CM

## 2024-04-30 DIAGNOSIS — F32.A DEPRESSION, UNSPECIFIED DEPRESSION TYPE: ICD-10-CM

## 2024-04-30 LAB
ANION GAP SERPL CALC-SCNC: 3 MMOL/L (ref 5–15)
BUN SERPL-MCNC: 15 MG/DL (ref 6–20)
BUN/CREAT SERPL: 14 (ref 12–20)
CALCIUM SERPL-MCNC: 10.7 MG/DL (ref 8.5–10.1)
CHLORIDE SERPL-SCNC: 108 MMOL/L (ref 97–108)
CO2 SERPL-SCNC: 29 MMOL/L (ref 21–32)
CREAT SERPL-MCNC: 1.07 MG/DL (ref 0.55–1.02)
GLUCOSE SERPL-MCNC: 98 MG/DL (ref 65–100)
POTASSIUM SERPL-SCNC: 4 MMOL/L (ref 3.5–5.1)
SODIUM SERPL-SCNC: 140 MMOL/L (ref 136–145)

## 2024-04-30 PROCEDURE — 99214 OFFICE O/P EST MOD 30 MIN: CPT | Performed by: INTERNAL MEDICINE

## 2024-04-30 PROCEDURE — 1123F ACP DISCUSS/DSCN MKR DOCD: CPT | Performed by: INTERNAL MEDICINE

## 2024-04-30 PROCEDURE — 3075F SYST BP GE 130 - 139MM HG: CPT | Performed by: INTERNAL MEDICINE

## 2024-04-30 PROCEDURE — G0439 PPPS, SUBSEQ VISIT: HCPCS | Performed by: INTERNAL MEDICINE

## 2024-04-30 PROCEDURE — 3079F DIAST BP 80-89 MM HG: CPT | Performed by: INTERNAL MEDICINE

## 2024-04-30 RX ORDER — ESCITALOPRAM OXALATE 10 MG/1
10 TABLET ORAL DAILY
Qty: 30 TABLET | Refills: 3 | Status: SHIPPED | OUTPATIENT
Start: 2024-04-30

## 2024-04-30 RX ORDER — PANTOPRAZOLE SODIUM 40 MG/1
40 TABLET, DELAYED RELEASE ORAL DAILY
COMMUNITY
Start: 2024-03-01

## 2024-04-30 SDOH — ECONOMIC STABILITY: HOUSING INSECURITY
IN THE LAST 12 MONTHS, WAS THERE A TIME WHEN YOU DID NOT HAVE A STEADY PLACE TO SLEEP OR SLEPT IN A SHELTER (INCLUDING NOW)?: NO

## 2024-04-30 SDOH — ECONOMIC STABILITY: FOOD INSECURITY: WITHIN THE PAST 12 MONTHS, THE FOOD YOU BOUGHT JUST DIDN'T LAST AND YOU DIDN'T HAVE MONEY TO GET MORE.: NEVER TRUE

## 2024-04-30 SDOH — ECONOMIC STABILITY: FOOD INSECURITY: WITHIN THE PAST 12 MONTHS, YOU WORRIED THAT YOUR FOOD WOULD RUN OUT BEFORE YOU GOT MONEY TO BUY MORE.: NEVER TRUE

## 2024-04-30 SDOH — ECONOMIC STABILITY: INCOME INSECURITY: HOW HARD IS IT FOR YOU TO PAY FOR THE VERY BASICS LIKE FOOD, HOUSING, MEDICAL CARE, AND HEATING?: NOT HARD AT ALL

## 2024-04-30 ASSESSMENT — PATIENT HEALTH QUESTIONNAIRE - PHQ9
SUM OF ALL RESPONSES TO PHQ9 QUESTIONS 1 & 2: 4
2. FEELING DOWN, DEPRESSED OR HOPELESS: NEARLY EVERY DAY
SUM OF ALL RESPONSES TO PHQ QUESTIONS 1-9: 9
9. THOUGHTS THAT YOU WOULD BE BETTER OFF DEAD, OR OF HURTING YOURSELF: NOT AT ALL
SUM OF ALL RESPONSES TO PHQ QUESTIONS 1-9: 9
10. IF YOU CHECKED OFF ANY PROBLEMS, HOW DIFFICULT HAVE THESE PROBLEMS MADE IT FOR YOU TO DO YOUR WORK, TAKE CARE OF THINGS AT HOME, OR GET ALONG WITH OTHER PEOPLE: NOT DIFFICULT AT ALL
7. TROUBLE CONCENTRATING ON THINGS, SUCH AS READING THE NEWSPAPER OR WATCHING TELEVISION: SEVERAL DAYS
3. TROUBLE FALLING OR STAYING ASLEEP: SEVERAL DAYS
1. LITTLE INTEREST OR PLEASURE IN DOING THINGS: SEVERAL DAYS
SUM OF ALL RESPONSES TO PHQ QUESTIONS 1-9: 9
8. MOVING OR SPEAKING SO SLOWLY THAT OTHER PEOPLE COULD HAVE NOTICED. OR THE OPPOSITE, BEING SO FIGETY OR RESTLESS THAT YOU HAVE BEEN MOVING AROUND A LOT MORE THAN USUAL: NOT AT ALL
4. FEELING TIRED OR HAVING LITTLE ENERGY: NEARLY EVERY DAY
SUM OF ALL RESPONSES TO PHQ QUESTIONS 1-9: 9
6. FEELING BAD ABOUT YOURSELF - OR THAT YOU ARE A FAILURE OR HAVE LET YOURSELF OR YOUR FAMILY DOWN: NOT AT ALL
5. POOR APPETITE OR OVEREATING: NOT AT ALL

## 2024-04-30 ASSESSMENT — LIFESTYLE VARIABLES
HOW OFTEN DO YOU HAVE A DRINK CONTAINING ALCOHOL: NEVER
HOW MANY STANDARD DRINKS CONTAINING ALCOHOL DO YOU HAVE ON A TYPICAL DAY: PATIENT DOES NOT DRINK

## 2024-04-30 NOTE — PROGRESS NOTES
Medicare Annual Wellness Visit    Josette Garay is here for Medicare AWV (Patient here for Annual Medicare Wellness Exam. )    Assessment & Plan   Medicare annual wellness visit, subsequent  Recommendations for Preventive Services Due: see orders and patient instructions/AVS.  Recommended screening schedule for the next 5-10 years is provided to the patient in written form: see Patient Instructions/AVS.     No follow-ups on file.     Subjective       Patient's complete Health Risk Assessment and screening values have been reviewed and are found in Flowsheets. The following problems were reviewed today and where indicated follow up appointments were made and/or referrals ordered.    Positive Risk Factor Screenings with Interventions:        Depression:  PHQ-2 Score: 4  PHQ-9 Total Score: 9    Interpretation:  5-9 mild   10-14 moderate   15-19 moderately severe   20-27 severe     Interventions:  See A/P for any pertinent orders          General HRA Questions:  Select all that apply: (!) New or Increased Fatigue    Fatigue Interventions:  See A/P for plan and any pertinent orders      Activity, Diet, and Weight:  On average, how many days per week do you engage in moderate to strenuous exercise (like a brisk walk)?: 0 days  On average, how many minutes do you engage in exercise at this level?: 0 min    Do you eat balanced/healthy meals regularly?: Yes    Body mass index is 26.68 kg/m².      Inactivity Interventions:  See A/P for plan and any pertinent orders             Advanced Directives:  Do you have a Living Will?: (!) No    Intervention:  has NO advanced directive - information provided                Objective   Vitals:    04/30/24 1043   BP: 137/84   Site: Right Upper Arm   Position: Sitting   Cuff Size: Large Adult   Pulse: 83   Resp: 18   Temp: 98.1 °F (36.7 °C)   TempSrc: Oral   SpO2: 98%   Weight: 72.7 kg (160 lb 4.8 oz)   Height: 1.651 m (5' 5\")      Body mass index is 26.68 kg/m².               Allergies

## 2024-04-30 NOTE — PROGRESS NOTES
[facility]  Doug Rizzo MD, FACP, CMD  [address] [facility] [address]  Phone:  [phone]  Fax: [phone]       Chief Complaint   Patient presents with    Medicare AW     Patient here for Annual Medicare Wellness Exam.    .      SUBJECTIVE:    Josette Garay is a 73 y.o. female Patient returns today with a history of chronic kidney disease, lumbar spinal stenosis, mild depression, migraines, impaired glucose tolerance, hypertension, headaches, dyslipidemia, dense breasts, shoulder pain and is seen for evaluation.       Patient states since the early part of the month she has been depressed, quiet all the time and not doing well mentally. She is on Nortriptyline.  Her neurologist did not think it was related to Nortriptyline, but related to the situation she has.      Patient complains of warts that seem to be just popping up. She does not know exactly what they are, but appear to be warts to her.    She is wondering about her CKD, which retrospectively goes back to 2020.             Current Outpatient Medications   Medication Sig Dispense Refill    pantoprazole (PROTONIX) 40 MG tablet Take 1 tablet by mouth daily      escitalopram (LEXAPRO) 10 MG tablet Take 1 tablet by mouth daily 30 tablet 3    losartan (COZAAR) 100 MG tablet TAKE 1 TABLET BY MOUTH EVERY DAY 90 tablet 3    amLODIPine (NORVASC) 5 MG tablet TAKE 1 TABLET BY MOUTH EVERY DAY 90 tablet 3    magnesium oxide (MAG-OX) 400 (240 Mg) MG tablet Take 1 tablet by mouth daily      fluticasone (FLONASE) 50 MCG/ACT nasal spray SPRAY 2 SPRAYS INTO EACH NOSTRIL EVERY DAY 1 each 11    traZODone (DESYREL) 100 MG tablet TAKE 1 TABLET BY MOUTH EVERY DAY AT NIGHT 90 tablet 3    meclizine (ANTIVERT) 25 MG tablet TAKE 1 TABLET BY MOUTH THREE (3) TIMES DAILY AS NEEDED FOR DIZZINESS. 60 tablet 11    cetirizine (ZYRTEC) 10 MG tablet Take 1 tablet by mouth daily as needed      SUMAtriptan (IMITREX) 100 MG tablet Take 1 tablet by mouth

## 2024-04-30 NOTE — PATIENT INSTRUCTIONS
professional. [email or web address] disclaims any warranty or liability for your use of this information.           A Healthy Heart: Care Instructions  Overview     Coronary artery disease, also called heart disease, occurs when a substance called plaque builds up in the vessels that supply oxygen-rich blood to your heart muscle. This can narrow the blood vessels and reduce blood flow. A heart attack happens when blood flow is completely blocked. A high-fat diet, smoking, and other factors increase the risk of heart disease.  Your doctor has found that you have a chance of having heart disease. A heart-healthy lifestyle can help keep your heart healthy and prevent heart disease. This lifestyle includes eating healthy, being active, staying at a weight that's healthy for you, and not smoking or using tobacco. It also includes taking medicines as directed, managing other health conditions, and trying to get a healthy amount of sleep.  Follow-up care is a key part of your treatment and safety. Be sure to make and go to all appointments, and call your doctor if you are having problems. It's also a good idea to know your test results and keep a list of the medicines you take.  How can you care for yourself at home?  Diet    Use less salt when you cook and eat. This helps lower your blood pressure. Taste food before salting. Add only a little salt when you think you need it. With time, your taste buds will adjust to less salt.     Eat fewer snack items, fast foods, canned soups, and other high-salt, high-fat, processed foods.     Read food labels and try to avoid saturated and trans fats. They increase your risk of heart disease by raising cholesterol levels.     Limit the amount of solid fat--butter, margarine, and shortening--you eat. Use olive, peanut, or canola oil when you cook. Bake, broil, and steam foods instead of frying them.     Eat a variety of fruit and vegetables every day. Dark green, deep orange, red,

## 2024-04-30 NOTE — PROGRESS NOTES
Chief Complaint   Patient presents with    Medicare AWV     Patient here for Annual Medicare Wellness Exam.      \"Have you been to the ER, urgent care clinic since your last visit?  Hospitalized since your last visit?\"    YES - When: approximately 5 months ago.  [facility]    “Have you seen or consulted any other health care providers outside of [facility] since your last visit?”    NO        “Have you had a colorectal cancer screening such as a colonoscopy/FIT/Cologuard?    NO    Date of last Colonoscopy: 4/18/2014  No cologuard on file  No FIT/FOBT on file   No flexible sigmoidoscopy on file         Click Here for Release of Records Request

## 2024-05-19 RX ORDER — TRAZODONE HYDROCHLORIDE 100 MG/1
TABLET ORAL
Qty: 90 TABLET | Refills: 3 | Status: SHIPPED | OUTPATIENT
Start: 2024-05-19

## 2024-05-23 RX ORDER — ESCITALOPRAM OXALATE 10 MG/1
10 TABLET ORAL DAILY
Qty: 90 TABLET | Refills: 2 | Status: SHIPPED | OUTPATIENT
Start: 2024-05-23

## 2024-06-05 RX ORDER — CETIRIZINE HYDROCHLORIDE 10 MG/1
10 TABLET ORAL DAILY PRN
Qty: 90 TABLET | Refills: 3 | Status: SHIPPED | OUTPATIENT
Start: 2024-06-05

## 2024-12-04 ENCOUNTER — OFFICE VISIT (OUTPATIENT)
Facility: CLINIC | Age: 74
End: 2024-12-04
Payer: MEDICARE

## 2024-12-04 VITALS
TEMPERATURE: 98.2 F | BODY MASS INDEX: 27.32 KG/M2 | SYSTOLIC BLOOD PRESSURE: 132 MMHG | RESPIRATION RATE: 16 BRPM | DIASTOLIC BLOOD PRESSURE: 70 MMHG | OXYGEN SATURATION: 93 % | HEIGHT: 65 IN | HEART RATE: 40 BPM | WEIGHT: 164 LBS

## 2024-12-04 DIAGNOSIS — I10 PRIMARY HYPERTENSION: Primary | ICD-10-CM

## 2024-12-04 DIAGNOSIS — E55.9 VITAMIN D DEFICIENCY: ICD-10-CM

## 2024-12-04 DIAGNOSIS — N18.31 STAGE 3A CHRONIC KIDNEY DISEASE (HCC): ICD-10-CM

## 2024-12-04 DIAGNOSIS — G31.84 MILD COGNITIVE IMPAIRMENT OF UNCERTAIN OR UNKNOWN ETIOLOGY: ICD-10-CM

## 2024-12-04 DIAGNOSIS — R73.02 IGT (IMPAIRED GLUCOSE TOLERANCE): ICD-10-CM

## 2024-12-04 DIAGNOSIS — E78.5 DYSLIPIDEMIA: ICD-10-CM

## 2024-12-04 PROCEDURE — 1159F MED LIST DOCD IN RCRD: CPT | Performed by: INTERNAL MEDICINE

## 2024-12-04 PROCEDURE — 1123F ACP DISCUSS/DSCN MKR DOCD: CPT | Performed by: INTERNAL MEDICINE

## 2024-12-04 PROCEDURE — 3078F DIAST BP <80 MM HG: CPT | Performed by: INTERNAL MEDICINE

## 2024-12-04 PROCEDURE — 3075F SYST BP GE 130 - 139MM HG: CPT | Performed by: INTERNAL MEDICINE

## 2024-12-04 PROCEDURE — 99214 OFFICE O/P EST MOD 30 MIN: CPT | Performed by: INTERNAL MEDICINE

## 2024-12-04 RX ORDER — CEFUROXIME AXETIL 500 MG/1
500 TABLET ORAL 2 TIMES DAILY
Qty: 20 TABLET | Refills: 0 | Status: SHIPPED | OUTPATIENT
Start: 2024-12-04 | End: 2024-12-14

## 2024-12-04 RX ORDER — TOPIRAMATE 200 MG/1
200 TABLET, FILM COATED ORAL 2 TIMES DAILY
COMMUNITY

## 2024-12-04 NOTE — PROGRESS NOTES
Chief Complaint   Patient presents with    Sinus Problem    Fatigue     \"Have you been to the ER, urgent care clinic since your last visit?  Hospitalized since your last visit?\"    YES - When: approximately 4 days ago.  Where and Why: Allergic reaction .    “Have you seen or consulted any other health care providers outside our system since your last visit?”    YES - When: approximately 4 days ago.  [facility]      “Have you had a colorectal cancer screening such as a colonoscopy/FIT/Cologuard?    NO    Date of last Colonoscopy: 4/18/2014  No cologuard on file  No FIT/FOBT on file   No flexible sigmoidoscopy on file

## 2024-12-04 NOTE — PROGRESS NOTES
[facility]  Doug Rizzo MD, FACP, CMD  [address] [facility] [address]  Phone:  [phone]  Fax: [phone]       Chief Complaint   Patient presents with    Sinus Problem    Fatigue   .      SUBJECTIVE:  History of Present Illness         Josette Garay is a 74 y.o. female  patient is a 74-year-old female who returns today for evaluation with a known history of primary hypertension, chronic kidney disease stage 3, impaired glucose tolerance, and dyslipidemia.    She has been experiencing fatigue since October 2024. [facility] iron supplements due to a low blood count. However, after a follow-up blood test in late October 2024 showed normal iron levels, she was advised to discontinue the iron supplements. Despite her fatigue, she maintains an active lifestyle, walking 10,000 steps a day at her job at [employer]. She also takes B12 and a multivitamin daily.    She has a history of chronic kidney disease and is currently taking amlodipine, Zyrtec, losartan, Flonase nasal spray, magnesium, and meclizine as needed for dizziness. She is not taking Lexapro or her cholesterol medication due to side effects. She is also on Topamax, which she resumed a month ago, and Imitrex. She [facility]. She has a colonoscopy scheduled for next month.    She has been suffering from a sinus infection for about 2 weeks, accompanied by a headache and constant nasal discharge. She is seeking an antibiotic for her sinus infection.    She has noticed an increase in size and itching of a skin lesion and is concerned it may be a wart. She also has a few similar lesions behind her ear.    She experienced two allergic reactions, the cause of which remains unknown.    She is forgetful and wants to know if she has Alzheimer's. She [facility]. They took her off Topamax because

## 2024-12-06 LAB
25(OH)D3 SERPL-MCNC: 32.6 NG/ML (ref 30–100)
ALBUMIN SERPL-MCNC: 4.2 G/DL (ref 3.5–5)
ALBUMIN/GLOB SERPL: 1.2 (ref 1.1–2.2)
ALP SERPL-CCNC: 82 U/L (ref 45–117)
ALT SERPL-CCNC: 27 U/L (ref 12–78)
ANION GAP SERPL CALC-SCNC: 4 MMOL/L (ref 2–12)
APPEARANCE UR: CLEAR
AST SERPL-CCNC: 20 U/L (ref 15–37)
BACTERIA URNS QL MICRO: NEGATIVE /HPF
BASOPHILS # BLD: 0.1 K/UL (ref 0–0.1)
BASOPHILS NFR BLD: 2 % (ref 0–1)
BILIRUB SERPL-MCNC: 0.2 MG/DL (ref 0.2–1)
BILIRUB UR QL: NEGATIVE
BUN SERPL-MCNC: 12 MG/DL (ref 6–20)
BUN/CREAT SERPL: 10 (ref 12–20)
CALCIUM SERPL-MCNC: 12.3 MG/DL (ref 8.5–10.1)
CHLORIDE SERPL-SCNC: 114 MMOL/L (ref 97–108)
CHOLEST SERPL-MCNC: 288 MG/DL
CO2 SERPL-SCNC: 24 MMOL/L (ref 21–32)
COLOR UR: ABNORMAL
CREAT SERPL-MCNC: 1.26 MG/DL (ref 0.55–1.02)
DIFFERENTIAL METHOD BLD: ABNORMAL
EOSINOPHIL # BLD: 0.3 K/UL (ref 0–0.4)
EOSINOPHIL NFR BLD: 4 % (ref 0–7)
EPITH CASTS URNS QL MICRO: ABNORMAL /LPF
ERYTHROCYTE [DISTWIDTH] IN BLOOD BY AUTOMATED COUNT: 14.6 % (ref 11.5–14.5)
EST. AVERAGE GLUCOSE BLD GHB EST-MCNC: 120 MG/DL
GLOBULIN SER CALC-MCNC: 3.4 G/DL (ref 2–4)
GLUCOSE SERPL-MCNC: 99 MG/DL (ref 65–100)
GLUCOSE UR STRIP.AUTO-MCNC: NEGATIVE MG/DL
HBA1C MFR BLD: 5.8 % (ref 4–5.6)
HCT VFR BLD AUTO: 43.5 % (ref 35–47)
HDLC SERPL-MCNC: 44 MG/DL
HDLC SERPL: 6.5 (ref 0–5)
HGB BLD-MCNC: 13.8 G/DL (ref 11.5–16)
HGB UR QL STRIP: NEGATIVE
HYALINE CASTS URNS QL MICRO: ABNORMAL /LPF (ref 0–5)
IMM GRANULOCYTES # BLD AUTO: 0 K/UL (ref 0–0.04)
IMM GRANULOCYTES NFR BLD AUTO: 0 % (ref 0–0.5)
KETONES UR QL STRIP.AUTO: NEGATIVE MG/DL
LDLC SERPL CALC-MCNC: 184.4 MG/DL (ref 0–100)
LEUKOCYTE ESTERASE UR QL STRIP.AUTO: ABNORMAL
LYMPHOCYTES # BLD: 3 K/UL (ref 0.8–3.5)
LYMPHOCYTES NFR BLD: 48 % (ref 12–49)
MCH RBC QN AUTO: 28.7 PG (ref 26–34)
MCHC RBC AUTO-ENTMCNC: 31.7 G/DL (ref 30–36.5)
MCV RBC AUTO: 90.4 FL (ref 80–99)
MONOCYTES # BLD: 0.5 K/UL (ref 0–1)
MONOCYTES NFR BLD: 8 % (ref 5–13)
NEUTS SEG # BLD: 2.3 K/UL (ref 1.8–8)
NEUTS SEG NFR BLD: 38 % (ref 32–75)
NITRITE UR QL STRIP.AUTO: NEGATIVE
NRBC # BLD: 0 K/UL (ref 0–0.01)
NRBC BLD-RTO: 0 PER 100 WBC
PH UR STRIP: 6.5 (ref 5–8)
PLATELET # BLD AUTO: 410 K/UL (ref 150–400)
PMV BLD AUTO: 9.6 FL (ref 8.9–12.9)
POTASSIUM SERPL-SCNC: 4.1 MMOL/L (ref 3.5–5.1)
PROT SERPL-MCNC: 7.6 G/DL (ref 6.4–8.2)
PROT UR STRIP-MCNC: NEGATIVE MG/DL
RBC # BLD AUTO: 4.81 M/UL (ref 3.8–5.2)
RBC #/AREA URNS HPF: ABNORMAL /HPF (ref 0–5)
SODIUM SERPL-SCNC: 142 MMOL/L (ref 136–145)
SP GR UR REFRACTOMETRY: 1.01 (ref 1–1.03)
TRIGL SERPL-MCNC: 298 MG/DL
TSH SERPL DL<=0.05 MIU/L-ACNC: 1.15 UIU/ML (ref 0.36–3.74)
URINE CULTURE IF INDICATED: ABNORMAL
UROBILINOGEN UR QL STRIP.AUTO: 0.2 EU/DL (ref 0.2–1)
VLDLC SERPL CALC-MCNC: 59.6 MG/DL
WBC # BLD AUTO: 6.1 K/UL (ref 3.6–11)
WBC URNS QL MICRO: ABNORMAL /HPF (ref 0–4)

## 2024-12-07 DIAGNOSIS — E21.3 HYPERPARATHYROIDISM (HCC): Primary | ICD-10-CM

## 2024-12-09 DIAGNOSIS — N18.31 STAGE 3A CHRONIC KIDNEY DISEASE (HCC): ICD-10-CM

## 2024-12-10 ENCOUNTER — TELEPHONE (OUTPATIENT)
Facility: CLINIC | Age: 74
End: 2024-12-10

## 2024-12-10 NOTE — TELEPHONE ENCOUNTER
Called patient twice.LVM for patient to call back office regarding calcium lab result and referral

## 2024-12-11 ENCOUNTER — TELEPHONE (OUTPATIENT)
Facility: CLINIC | Age: 74
End: 2024-12-11

## 2024-12-11 NOTE — TELEPHONE ENCOUNTER
----- Message from Dr. Doug Rizzo MD sent at 12/7/2024  8:52 AM EST -----  Advised patient we elevated calcium options of treatment evaluation through the [facility] or referrals and I have given

## 2024-12-13 ENCOUNTER — HOSPITAL ENCOUNTER (OUTPATIENT)
Facility: HOSPITAL | Age: 74
Discharge: HOME | End: 2024-12-16
Attending: INTERNAL MEDICINE
Payer: MEDICARE

## 2024-12-13 VITALS — BODY MASS INDEX: 27.29 KG/M2 | WEIGHT: 164 LBS

## 2024-12-13 DIAGNOSIS — Z12.31 OTHER SCREENING MAMMOGRAM: ICD-10-CM

## 2024-12-13 PROCEDURE — 77063 BREAST TOMOSYNTHESIS BI: CPT

## 2025-01-22 RX ORDER — AMLODIPINE BESYLATE 5 MG/1
5 TABLET ORAL DAILY
Qty: 90 TABLET | Refills: 3 | Status: SHIPPED | OUTPATIENT
Start: 2025-01-22

## 2025-04-16 ENCOUNTER — OFFICE VISIT (OUTPATIENT)
Facility: CLINIC | Age: 75
End: 2025-04-16
Payer: MEDICARE

## 2025-04-16 VITALS
SYSTOLIC BLOOD PRESSURE: 136 MMHG | BODY MASS INDEX: 27.36 KG/M2 | WEIGHT: 164.2 LBS | HEIGHT: 65 IN | OXYGEN SATURATION: 96 % | DIASTOLIC BLOOD PRESSURE: 80 MMHG | TEMPERATURE: 98.5 F | RESPIRATION RATE: 16 BRPM | HEART RATE: 83 BPM

## 2025-04-16 DIAGNOSIS — I10 PRIMARY HYPERTENSION: ICD-10-CM

## 2025-04-16 DIAGNOSIS — B97.4: ICD-10-CM

## 2025-04-16 DIAGNOSIS — E83.52 HYPERCALCEMIA: ICD-10-CM

## 2025-04-16 DIAGNOSIS — E78.5 DYSLIPIDEMIA: ICD-10-CM

## 2025-04-16 DIAGNOSIS — J02.8: ICD-10-CM

## 2025-04-16 DIAGNOSIS — K21.9 GASTROESOPHAGEAL REFLUX DISEASE WITHOUT ESOPHAGITIS: ICD-10-CM

## 2025-04-16 DIAGNOSIS — Z00.00 MEDICARE ANNUAL WELLNESS VISIT, SUBSEQUENT: Primary | ICD-10-CM

## 2025-04-16 DIAGNOSIS — N18.31 STAGE 3A CHRONIC KIDNEY DISEASE (HCC): ICD-10-CM

## 2025-04-16 DIAGNOSIS — R73.02 IGT (IMPAIRED GLUCOSE TOLERANCE): ICD-10-CM

## 2025-04-16 PROBLEM — B33.8 RSV (RESPIRATORY SYNCYTIAL VIRUS INFECTION): Status: ACTIVE | Noted: 2025-03-12

## 2025-04-16 PROCEDURE — 3075F SYST BP GE 130 - 139MM HG: CPT | Performed by: INTERNAL MEDICINE

## 2025-04-16 PROCEDURE — 1159F MED LIST DOCD IN RCRD: CPT | Performed by: INTERNAL MEDICINE

## 2025-04-16 PROCEDURE — 1126F AMNT PAIN NOTED NONE PRSNT: CPT | Performed by: INTERNAL MEDICINE

## 2025-04-16 PROCEDURE — 1123F ACP DISCUSS/DSCN MKR DOCD: CPT | Performed by: INTERNAL MEDICINE

## 2025-04-16 PROCEDURE — G0439 PPPS, SUBSEQ VISIT: HCPCS | Performed by: INTERNAL MEDICINE

## 2025-04-16 PROCEDURE — 3079F DIAST BP 80-89 MM HG: CPT | Performed by: INTERNAL MEDICINE

## 2025-04-16 PROCEDURE — 99214 OFFICE O/P EST MOD 30 MIN: CPT | Performed by: INTERNAL MEDICINE

## 2025-04-16 RX ORDER — SUCRALFATE ORAL 1 G/10ML
1 SUSPENSION ORAL 4 TIMES DAILY
Qty: 1200 ML | Refills: 3 | Status: SHIPPED | OUTPATIENT
Start: 2025-04-16

## 2025-04-16 RX ORDER — CEFUROXIME AXETIL 500 MG/1
500 TABLET ORAL 2 TIMES DAILY
Qty: 28 TABLET | Refills: 0 | Status: SHIPPED | OUTPATIENT
Start: 2025-04-16

## 2025-04-16 SDOH — ECONOMIC STABILITY: FOOD INSECURITY: WITHIN THE PAST 12 MONTHS, THE FOOD YOU BOUGHT JUST DIDN'T LAST AND YOU DIDN'T HAVE MONEY TO GET MORE.: NEVER TRUE

## 2025-04-16 SDOH — ECONOMIC STABILITY: FOOD INSECURITY: WITHIN THE PAST 12 MONTHS, YOU WORRIED THAT YOUR FOOD WOULD RUN OUT BEFORE YOU GOT MONEY TO BUY MORE.: NEVER TRUE

## 2025-04-16 ASSESSMENT — PATIENT HEALTH QUESTIONNAIRE - PHQ9
3. TROUBLE FALLING OR STAYING ASLEEP: NOT AT ALL
10. IF YOU CHECKED OFF ANY PROBLEMS, HOW DIFFICULT HAVE THESE PROBLEMS MADE IT FOR YOU TO DO YOUR WORK, TAKE CARE OF THINGS AT HOME, OR GET ALONG WITH OTHER PEOPLE: NOT DIFFICULT AT ALL
7. TROUBLE CONCENTRATING ON THINGS, SUCH AS READING THE NEWSPAPER OR WATCHING TELEVISION: NOT AT ALL
8. MOVING OR SPEAKING SO SLOWLY THAT OTHER PEOPLE COULD HAVE NOTICED. OR THE OPPOSITE, BEING SO FIGETY OR RESTLESS THAT YOU HAVE BEEN MOVING AROUND A LOT MORE THAN USUAL: NOT AT ALL
SUM OF ALL RESPONSES TO PHQ QUESTIONS 1-9: 0
SUM OF ALL RESPONSES TO PHQ QUESTIONS 1-9: 0
5. POOR APPETITE OR OVEREATING: NOT AT ALL
2. FEELING DOWN, DEPRESSED OR HOPELESS: NOT AT ALL
9. THOUGHTS THAT YOU WOULD BE BETTER OFF DEAD, OR OF HURTING YOURSELF: NOT AT ALL
SUM OF ALL RESPONSES TO PHQ QUESTIONS 1-9: 0
6. FEELING BAD ABOUT YOURSELF - OR THAT YOU ARE A FAILURE OR HAVE LET YOURSELF OR YOUR FAMILY DOWN: NOT AT ALL
4. FEELING TIRED OR HAVING LITTLE ENERGY: NOT AT ALL
SUM OF ALL RESPONSES TO PHQ QUESTIONS 1-9: 0
1. LITTLE INTEREST OR PLEASURE IN DOING THINGS: NOT AT ALL

## 2025-04-16 ASSESSMENT — ANXIETY QUESTIONNAIRES
1. FEELING NERVOUS, ANXIOUS, OR ON EDGE: SEVERAL DAYS
7. FEELING AFRAID AS IF SOMETHING AWFUL MIGHT HAPPEN: SEVERAL DAYS
2. NOT BEING ABLE TO STOP OR CONTROL WORRYING: SEVERAL DAYS
IF YOU CHECKED OFF ANY PROBLEMS ON THIS QUESTIONNAIRE, HOW DIFFICULT HAVE THESE PROBLEMS MADE IT FOR YOU TO DO YOUR WORK, TAKE CARE OF THINGS AT HOME, OR GET ALONG WITH OTHER PEOPLE: SOMEWHAT DIFFICULT
5. BEING SO RESTLESS THAT IT IS HARD TO SIT STILL: SEVERAL DAYS
6. BECOMING EASILY ANNOYED OR IRRITABLE: SEVERAL DAYS
3. WORRYING TOO MUCH ABOUT DIFFERENT THINGS: SEVERAL DAYS
GAD7 TOTAL SCORE: 7
4. TROUBLE RELAXING: SEVERAL DAYS

## 2025-04-16 ASSESSMENT — LIFESTYLE VARIABLES
HOW OFTEN DO YOU HAVE A DRINK CONTAINING ALCOHOL: MONTHLY OR LESS
HOW MANY STANDARD DRINKS CONTAINING ALCOHOL DO YOU HAVE ON A TYPICAL DAY: 1 OR 2

## 2025-04-16 NOTE — PROGRESS NOTES
Chief Complaint   Patient presents with    Medicare AWV     \"Have you been to the ER, urgent care clinic since your last visit?  Hospitalized since your last visit?\"    YES - When: approximately 1 months ago.  [facility].    “Have you seen or consulted any other health care providers outside our system since your last visit?”    NO      “Have you had a colorectal cancer screening such as a colonoscopy/FIT/Cologuard?    NO    Date of last Colonoscopy: 4/18/2014  No cologuard on file  No FIT/FOBT on file   No flexible sigmoidoscopy on file          Medicare Annual Wellness Visit    Josette Garay is here for Medicare AWV    Assessment & Plan   Medicare annual wellness visit, subsequent     No follow-ups on file.     Subjective       Patient's complete Health Risk Assessment and screening values have been reviewed and are found in Flowsheets. The following problems were reviewed today and where indicated follow up appointments were made and/or referrals ordered.    Positive Risk Factor Screenings with Interventions:     Cognitive:   Clock Drawing Test (CDT): (!) Abnormal  Words recalled: 3 Words Recalled  Total Score: 3  Total Score Interpretation: Normal Mini-Cog  Interventions:  See A/P for plan and any pertinent orders            Inactivity:  On average, how many days per week do you engage in moderate to strenuous exercise (like a brisk walk)?: 2 days (!) Abnormal  On average, how many minutes do you engage in exercise at this level?: 20 min  Interventions:  See A/P for plan and any pertinent orders           Advanced Directives:  Do you have a Living Will?: (!) No    Intervention:  has an advanced directive - a copy HAS NOT been provided.                                     Objective   Vitals:    04/16/25 1603 04/16/25 1605   BP: (!) 152/84 (!) 143/94   BP Site: Left Upper Arm Right Upper Arm   Patient Position: Sitting Sitting   BP 
without esophagitis        I have discussed the diagnosis with the patient and the intended plan as seen in the  Orders.  The patient understands and agees with the plan.  The patient has   received an after visit summary and questions were answered concerning  future plans  Patient labs and/or xrays were reviewed  Past records were reviewed.    PLAN:  Orders Placed This Encounter   Procedures    COLLECTION VENOUS BLOOD,VENIPUNCTURE    Basic Metabolic Panel     Standing Status:   Future     Number of Occurrences:   1     Expected Date:   4/16/2025     Expiration Date:   4/16/2026    Vitamin D 25 Hydroxy     Standing Status:   Future     Number of Occurrences:   1     Expected Date:   4/16/2025     Expiration Date:   4/16/2026    PTH, Intact     Standing Status:   Future     Number of Occurrences:   1     Expected Date:   4/16/2025     Expiration Date:   4/16/2026    Calcium, Ionized     Standing Status:   Future     Number of Occurrences:   1     Expected Date:   4/16/2025     Expiration Date:   4/16/2026    External Referral To Gastroenterology     Referral Priority:   Routine     Referral Type:   Eval and Treat     Referral Reason:   Specialty Services Required     Requested Specialty:   Gastroenterology     Number of Visits Requested:   1        Follow-up and Dispositions    Return in about 3 months (around 7/16/2025).                ATTENTION:   This medical record was transcribed using an electronic medical records system.  Although proofread, it may and can contain electronic and spelling errors.  Other human spelling and other errors may be present.  Corrections may be executed at a later time.  Please feel free to contact us for any clarifications as needed.

## 2025-04-17 ENCOUNTER — RESULTS FOLLOW-UP (OUTPATIENT)
Facility: CLINIC | Age: 75
End: 2025-04-17

## 2025-04-17 LAB
25(OH)D3 SERPL-MCNC: 54.2 NG/ML (ref 30–100)
ANION GAP SERPL CALC-SCNC: 6 MMOL/L (ref 2–12)
BUN SERPL-MCNC: 11 MG/DL (ref 6–20)
BUN/CREAT SERPL: 9 (ref 12–20)
CALCIUM SERPL-MCNC: 10 MG/DL (ref 8.5–10.1)
CALCIUM SERPL-MCNC: 10 MG/DL (ref 8.5–10.1)
CHLORIDE SERPL-SCNC: 108 MMOL/L (ref 97–108)
CO2 SERPL-SCNC: 26 MMOL/L (ref 21–32)
CREAT SERPL-MCNC: 1.19 MG/DL (ref 0.55–1.02)
GLUCOSE SERPL-MCNC: 100 MG/DL (ref 65–100)
POTASSIUM SERPL-SCNC: 4.2 MMOL/L (ref 3.5–5.1)
PTH-INTACT SERPL-MCNC: 46.8 PG/ML (ref 18.4–88)
SODIUM SERPL-SCNC: 140 MMOL/L (ref 136–145)

## 2025-04-18 RX ORDER — FLUCONAZOLE 150 MG/1
150 TABLET ORAL ONCE
Qty: 1 TABLET | Refills: 0 | Status: SHIPPED | OUTPATIENT
Start: 2025-04-18 | End: 2025-04-18

## 2025-04-18 RX ORDER — SUCRALFATE 1 G/1
1 TABLET ORAL 4 TIMES DAILY
Qty: 120 TABLET | Refills: 3 | Status: SHIPPED | OUTPATIENT
Start: 2025-04-18

## 2025-04-19 LAB — CA-I SERPL ISE-MCNC: 5.2 MG/DL (ref 4.5–5.6)

## 2025-04-21 RX ORDER — LOSARTAN POTASSIUM 100 MG/1
100 TABLET ORAL DAILY
Qty: 90 TABLET | Refills: 3 | Status: SHIPPED | OUTPATIENT
Start: 2025-04-21

## 2025-05-09 ENCOUNTER — TRANSCRIBE ORDERS (OUTPATIENT)
Facility: HOSPITAL | Age: 75
End: 2025-05-09

## 2025-05-09 DIAGNOSIS — Z01.89 ENCOUNTER FOR OTHER SPECIFIED SPECIAL EXAMINATIONS: Primary | ICD-10-CM

## 2025-05-16 ENCOUNTER — HOSPITAL ENCOUNTER (OUTPATIENT)
Facility: HOSPITAL | Age: 75
Discharge: HOME | End: 2025-05-19
Payer: OTHER GOVERNMENT

## 2025-05-16 DIAGNOSIS — Z01.89 ENCOUNTER FOR OTHER SPECIFIED SPECIAL EXAMINATIONS: ICD-10-CM

## 2025-05-16 PROCEDURE — 74240 X-RAY XM UPR GI TRC 1CNTRST: CPT

## 2025-08-02 ENCOUNTER — OFFICE VISIT (OUTPATIENT)
Age: 75
End: 2025-08-02

## 2025-08-02 VITALS
RESPIRATION RATE: 13 BRPM | TEMPERATURE: 98.2 F | DIASTOLIC BLOOD PRESSURE: 71 MMHG | HEIGHT: 65 IN | HEART RATE: 87 BPM | OXYGEN SATURATION: 98 % | SYSTOLIC BLOOD PRESSURE: 116 MMHG | BODY MASS INDEX: 27.92 KG/M2 | WEIGHT: 167.6 LBS

## 2025-08-02 DIAGNOSIS — R35.0 URINARY FREQUENCY: Primary | ICD-10-CM

## 2025-08-02 DIAGNOSIS — R10.9 RIGHT FLANK PAIN: ICD-10-CM

## 2025-08-02 LAB
BILIRUBIN, URINE, POC: NEGATIVE
BLOOD URINE, POC: NEGATIVE
GLUCOSE URINE, POC: NEGATIVE
KETONES, URINE, POC: NEGATIVE
LEUKOCYTE ESTERASE, URINE, POC: NEGATIVE
NITRITE, URINE, POC: NEGATIVE
PH, URINE, POC: 6 (ref 4.6–8)
PROTEIN,URINE, POC: NEGATIVE
SPECIFIC GRAVITY, URINE, POC: 1.01 (ref 1–1.03)
URINALYSIS CLARITY, POC: CLEAR
URINALYSIS COLOR, POC: YELLOW
UROBILINOGEN, POC: NORMAL MG/DL

## 2025-08-02 NOTE — PROGRESS NOTES
2025   Josette Garay (: 1950) is a 74 y.o. female, New patient, here for evaluation of the following chief complaint(s):  Urinary Frequency (Pt c/o urinary frequency/pressure while urinating, sx onset 3 weeks ago. Pt states she has taken AZO, sx has not subsided, pt states she has also started experiencing back pain on her right side yesterday. )      Below is the assessment and plan developed based on review of pertinent history, physical exam, labs, studies, and medications.  Assessment & Plan  Urinary frequency     No evidence of UTI ( UA neg nitrites, leuks) Pt is non toxic, afebrile. Stable for outpatient follow up with urology.     Orders:    AMB POC URINALYSIS DIP STICK MANUAL W/O MICRO    [facility] - Axel Marrero MD, Urology, [place]    Right flank pain    Kidney stone is in differential but likely nonobstructive at this time.  OTC Tylenol for pain. Recommend return to ED for worsening/persistent R flank pain.         Handout given with care instructions  OTC for symptom management. Increase fluid intake, ensure adequate nutritional intake.  Follow up with PCP as needed.  Go to ED with development of any acute symptoms.     Follow up:  Return in about 1 week (around 2025), or w/ urology for urinary frequency.  Follow up immediately for any new, worsening or changes or if symptoms are not improving over the next 5-7 days.     SUBJECTIVE/OBJECTIVE:    Limitation to History: None    Outside Historian: None    External Records Reviewed: None    SUBJECTIVE/OBJECTIVE:  Josette Garay is a 74 y.o. female presents with complaint of urinary frequency x 3 weeks. Has been taking Azo with little relief. Denies dysuria, fever/chills, n/v, hematuria, malodorous urine. Reports hx of urethral stenosis 20 years ago. Reports new dx of CKD and has been drinking more water lately as instructed by her doctor.       History provided by:  Patient   used: No    Urinary Frequency   This is a new